# Patient Record
Sex: MALE | Race: WHITE | NOT HISPANIC OR LATINO
[De-identification: names, ages, dates, MRNs, and addresses within clinical notes are randomized per-mention and may not be internally consistent; named-entity substitution may affect disease eponyms.]

---

## 2017-01-04 PROBLEM — Z00.00 ENCOUNTER FOR PREVENTIVE HEALTH EXAMINATION: Status: ACTIVE | Noted: 2017-01-04

## 2017-01-11 ENCOUNTER — NON-APPOINTMENT (OUTPATIENT)
Age: 72
End: 2017-01-11

## 2017-01-11 ENCOUNTER — APPOINTMENT (OUTPATIENT)
Dept: CARDIOLOGY | Facility: CLINIC | Age: 72
End: 2017-01-11

## 2017-01-11 VITALS
BODY MASS INDEX: 32.35 KG/M2 | WEIGHT: 226 LBS | HEART RATE: 57 BPM | SYSTOLIC BLOOD PRESSURE: 154 MMHG | DIASTOLIC BLOOD PRESSURE: 75 MMHG | HEIGHT: 70 IN | OXYGEN SATURATION: 98 %

## 2017-01-11 VITALS — DIASTOLIC BLOOD PRESSURE: 82 MMHG | SYSTOLIC BLOOD PRESSURE: 129 MMHG

## 2017-01-11 VITALS — DIASTOLIC BLOOD PRESSURE: 74 MMHG | SYSTOLIC BLOOD PRESSURE: 136 MMHG

## 2017-01-11 DIAGNOSIS — Z86.73 PERSONAL HISTORY OF TRANSIENT ISCHEMIC ATTACK (TIA), AND CEREBRAL INFARCTION W/OUT RESIDUAL DEFICITS: ICD-10-CM

## 2017-01-11 DIAGNOSIS — Z09 ENCOUNTER FOR FOLLOW-UP EXAMINATION AFTER COMPLETED TREATMENT FOR CONDITIONS OTHER THAN MALIGNANT NEOPLASM: ICD-10-CM

## 2017-01-11 DIAGNOSIS — Z86.79 PERSONAL HISTORY OF OTHER DISEASES OF THE CIRCULATORY SYSTEM: ICD-10-CM

## 2017-01-11 RX ORDER — CARVEDILOL 6.25 MG/1
6.25 TABLET, FILM COATED ORAL
Qty: 60 | Refills: 4 | Status: DISCONTINUED | COMMUNITY
End: 2017-01-11

## 2017-01-11 RX ORDER — ASPIRIN 325 MG/1
325 TABLET, FILM COATED ORAL DAILY
Qty: 30 | Refills: 0 | Status: DISCONTINUED | COMMUNITY
End: 2017-01-11

## 2017-01-25 ENCOUNTER — MEDICATION RENEWAL (OUTPATIENT)
Age: 72
End: 2017-01-25

## 2017-01-27 ENCOUNTER — APPOINTMENT (OUTPATIENT)
Dept: ELECTROPHYSIOLOGY | Facility: CLINIC | Age: 72
End: 2017-01-27

## 2017-01-29 ENCOUNTER — EMERGENCY (EMERGENCY)
Facility: HOSPITAL | Age: 72
LOS: 1 days | Discharge: HOME | End: 2017-01-29
Attending: EMERGENCY MEDICINE | Admitting: EMERGENCY MEDICINE
Payer: MEDICARE

## 2017-01-29 VITALS
SYSTOLIC BLOOD PRESSURE: 168 MMHG | OXYGEN SATURATION: 100 % | HEIGHT: 70 IN | DIASTOLIC BLOOD PRESSURE: 69 MMHG | WEIGHT: 220.02 LBS | HEART RATE: 82 BPM | RESPIRATION RATE: 20 BRPM | TEMPERATURE: 99 F

## 2017-01-29 DIAGNOSIS — Z90.49 ACQUIRED ABSENCE OF OTHER SPECIFIED PARTS OF DIGESTIVE TRACT: Chronic | ICD-10-CM

## 2017-01-29 LAB
ANION GAP SERPL CALC-SCNC: 19 MMOL/L — HIGH (ref 5–17)
APPEARANCE UR: CLEAR — SIGNIFICANT CHANGE UP
BACTERIA # UR AUTO: ABNORMAL
BILIRUB UR-MCNC: NEGATIVE — SIGNIFICANT CHANGE UP
BUN SERPL-MCNC: 34 MG/DL — HIGH (ref 8–20)
CALCIUM SERPL-MCNC: 9.9 MG/DL — SIGNIFICANT CHANGE UP (ref 8.6–10.2)
CHLORIDE SERPL-SCNC: 101 MMOL/L — SIGNIFICANT CHANGE UP (ref 98–107)
CO2 SERPL-SCNC: 20 MMOL/L — LOW (ref 22–29)
COLOR SPEC: YELLOW — SIGNIFICANT CHANGE UP
CREAT SERPL-MCNC: 1.51 MG/DL — HIGH (ref 0.5–1.3)
DIFF PNL FLD: ABNORMAL
EPI CELLS # UR: SIGNIFICANT CHANGE UP
GLUCOSE SERPL-MCNC: 208 MG/DL — HIGH (ref 70–115)
GLUCOSE UR QL: 50 MG/DL
HCT VFR BLD CALC: 34.6 % — LOW (ref 42–52)
HGB BLD-MCNC: 11.9 G/DL — LOW (ref 14–18)
KETONES UR-MCNC: ABNORMAL
LEUKOCYTE ESTERASE UR-ACNC: NEGATIVE — SIGNIFICANT CHANGE UP
MCHC RBC-ENTMCNC: 29.8 PG — SIGNIFICANT CHANGE UP (ref 27–31)
MCHC RBC-ENTMCNC: 34.4 G/DL — SIGNIFICANT CHANGE UP (ref 32–36)
MCV RBC AUTO: 86.7 FL — SIGNIFICANT CHANGE UP (ref 80–94)
NITRITE UR-MCNC: NEGATIVE — SIGNIFICANT CHANGE UP
PH UR: 5 — SIGNIFICANT CHANGE UP (ref 4.8–8)
PLATELET # BLD AUTO: 200 K/UL — SIGNIFICANT CHANGE UP (ref 150–400)
POTASSIUM SERPL-MCNC: 4.1 MMOL/L — SIGNIFICANT CHANGE UP (ref 3.5–5.3)
POTASSIUM SERPL-SCNC: 4.1 MMOL/L — SIGNIFICANT CHANGE UP (ref 3.5–5.3)
PROT UR-MCNC: 100 MG/DL
RBC # BLD: 3.99 M/UL — LOW (ref 4.6–6.2)
RBC # FLD: 14.3 % — SIGNIFICANT CHANGE UP (ref 11–15.6)
RBC CASTS # UR COMP ASSIST: >50 /HPF (ref 0–4)
SODIUM SERPL-SCNC: 140 MMOL/L — SIGNIFICANT CHANGE UP (ref 135–145)
SP GR SPEC: 1.02 — SIGNIFICANT CHANGE UP (ref 1.01–1.02)
UROBILINOGEN FLD QL: NEGATIVE MG/DL — SIGNIFICANT CHANGE UP
WBC # BLD: 11.62 K/UL — HIGH (ref 4.8–10.8)
WBC # FLD AUTO: 11.62 K/UL — HIGH (ref 4.8–10.8)
WBC UR QL: SIGNIFICANT CHANGE UP

## 2017-01-29 PROCEDURE — 96374 THER/PROPH/DIAG INJ IV PUSH: CPT

## 2017-01-29 PROCEDURE — 80048 BASIC METABOLIC PNL TOTAL CA: CPT

## 2017-01-29 PROCEDURE — 99284 EMERGENCY DEPT VISIT MOD MDM: CPT

## 2017-01-29 PROCEDURE — 81001 URINALYSIS AUTO W/SCOPE: CPT

## 2017-01-29 PROCEDURE — 74176 CT ABD & PELVIS W/O CONTRAST: CPT | Mod: 26

## 2017-01-29 PROCEDURE — 85027 COMPLETE CBC AUTOMATED: CPT

## 2017-01-29 PROCEDURE — 96376 TX/PRO/DX INJ SAME DRUG ADON: CPT

## 2017-01-29 PROCEDURE — 96375 TX/PRO/DX INJ NEW DRUG ADDON: CPT

## 2017-01-29 PROCEDURE — 99284 EMERGENCY DEPT VISIT MOD MDM: CPT | Mod: 25

## 2017-01-29 PROCEDURE — 74176 CT ABD & PELVIS W/O CONTRAST: CPT

## 2017-01-29 RX ORDER — SODIUM CHLORIDE 9 MG/ML
3 INJECTION INTRAMUSCULAR; INTRAVENOUS; SUBCUTANEOUS ONCE
Qty: 0 | Refills: 0 | Status: COMPLETED | OUTPATIENT
Start: 2017-01-29 | End: 2017-01-29

## 2017-01-29 RX ORDER — SODIUM CHLORIDE 9 MG/ML
1000 INJECTION INTRAMUSCULAR; INTRAVENOUS; SUBCUTANEOUS
Qty: 0 | Refills: 0 | Status: DISCONTINUED | OUTPATIENT
Start: 2017-01-29 | End: 2017-02-02

## 2017-01-29 RX ORDER — MORPHINE SULFATE 50 MG/1
4 CAPSULE, EXTENDED RELEASE ORAL ONCE
Qty: 0 | Refills: 0 | Status: DISCONTINUED | OUTPATIENT
Start: 2017-01-29 | End: 2017-01-29

## 2017-01-29 RX ORDER — HYDROMORPHONE HYDROCHLORIDE 2 MG/ML
1 INJECTION INTRAMUSCULAR; INTRAVENOUS; SUBCUTANEOUS ONCE
Qty: 0 | Refills: 0 | Status: DISCONTINUED | OUTPATIENT
Start: 2017-01-29 | End: 2017-01-29

## 2017-01-29 RX ORDER — ONDANSETRON 8 MG/1
4 TABLET, FILM COATED ORAL ONCE
Qty: 0 | Refills: 0 | Status: COMPLETED | OUTPATIENT
Start: 2017-01-29 | End: 2017-01-29

## 2017-01-29 RX ORDER — KETOROLAC TROMETHAMINE 30 MG/ML
30 SYRINGE (ML) INJECTION ONCE
Qty: 0 | Refills: 0 | Status: DISCONTINUED | OUTPATIENT
Start: 2017-01-29 | End: 2017-01-29

## 2017-01-29 RX ORDER — MORPHINE SULFATE 50 MG/1
2 CAPSULE, EXTENDED RELEASE ORAL ONCE
Qty: 0 | Refills: 0 | Status: DISCONTINUED | OUTPATIENT
Start: 2017-01-29 | End: 2017-01-29

## 2017-01-29 RX ADMIN — HYDROMORPHONE HYDROCHLORIDE 1 MILLIGRAM(S): 2 INJECTION INTRAMUSCULAR; INTRAVENOUS; SUBCUTANEOUS at 21:49

## 2017-01-29 RX ADMIN — SODIUM CHLORIDE 3 MILLILITER(S): 9 INJECTION INTRAMUSCULAR; INTRAVENOUS; SUBCUTANEOUS at 21:14

## 2017-01-29 RX ADMIN — HYDROMORPHONE HYDROCHLORIDE 1 MILLIGRAM(S): 2 INJECTION INTRAMUSCULAR; INTRAVENOUS; SUBCUTANEOUS at 22:25

## 2017-01-29 RX ADMIN — SODIUM CHLORIDE 100 MILLILITER(S): 9 INJECTION INTRAMUSCULAR; INTRAVENOUS; SUBCUTANEOUS at 21:35

## 2017-01-29 RX ADMIN — HYDROMORPHONE HYDROCHLORIDE 1 MILLIGRAM(S): 2 INJECTION INTRAMUSCULAR; INTRAVENOUS; SUBCUTANEOUS at 21:15

## 2017-01-29 RX ADMIN — Medication 30 MILLIGRAM(S): at 23:55

## 2017-01-29 RX ADMIN — MORPHINE SULFATE 4 MILLIGRAM(S): 50 CAPSULE, EXTENDED RELEASE ORAL at 23:55

## 2017-01-29 RX ADMIN — ONDANSETRON 4 MILLIGRAM(S): 8 TABLET, FILM COATED ORAL at 21:14

## 2017-01-29 RX ADMIN — MORPHINE SULFATE 4 MILLIGRAM(S): 50 CAPSULE, EXTENDED RELEASE ORAL at 23:24

## 2017-01-29 RX ADMIN — HYDROMORPHONE HYDROCHLORIDE 1 MILLIGRAM(S): 2 INJECTION INTRAMUSCULAR; INTRAVENOUS; SUBCUTANEOUS at 22:02

## 2017-01-29 RX ADMIN — Medication 30 MILLIGRAM(S): at 23:24

## 2017-01-29 NOTE — ED ADULT NURSE NOTE - PMH
Acute appendicitis, unspecified acute appendicitis type    CVA (cerebral vascular accident)    Diabetes    History of hypertension    Hyperlipemia    Peptic ulcer due to Helicobacter pylori

## 2017-01-29 NOTE — ED PROVIDER NOTE - OBJECTIVE STATEMENT
72 y/o M on Plavix and aspirin c/o acute onset of right flank pain at 1730 today while at rest. He denies hx of renal stones. Pt denies dysuria, hematuria, burning urination, nausea, vomiting, and fever. No aggravating or alleviating factors. Pt reports CVA in December 2016. NKDA. No further complaints at this time.

## 2017-01-29 NOTE — ED PROVIDER NOTE - NS ED MD SCRIBE ATTENDING SCRIBE SECTIONS
DISPOSITION/INTAKE ASSESSMENT/SCREENINGS/VITAL SIGNS( Pullset)/PHYSICAL EXAM/HIV/PAST MEDICAL/SURGICAL/SOCIAL HISTORY/REVIEW OF SYSTEMS/HISTORY OF PRESENT ILLNESS

## 2017-01-29 NOTE — ED STATDOCS - PROGRESS NOTE DETAILS
TO [facility]. Male presents to the ED c/o right flank pain. He notes that he is unable to urinate. Pt does have family hx of renal stones, although he himself did not have a kidney stone. Pt is on plavix/coumadin s/p CVA 4 months ago. Denies vomiting, fever, chills, HA, CP or SOB. No further complaints at this time. Will order protocol orders and send to [facility] ED.

## 2017-01-29 NOTE — ED STATDOCS - NS ED MD SCRIBE ATTENDING SCRIBE SECTIONS
PAST MEDICAL/SURGICAL/SOCIAL HISTORY/HIV/VITAL SIGNS( Pullset)/PROGRESS NOTE/RESULTS/HISTORY OF PRESENT ILLNESS/DISPOSITION/INTAKE ASSESSMENT/SCREENINGS/CONSULTATIONS/SHIFT CHANGE/REVIEW OF SYSTEMS/PHYSICAL EXAM

## 2017-01-29 NOTE — ED STATDOCS - DETAILS:
I, Julieta Allen, performed the initial face to face bedside interview with this patient regarding history of present illness, review of symptoms and relevant past medical, social and family history.  I completed an independent physical examination.  I was the initial provider who evaluated this patient.  The history, relevant review of systems, past medical and surgical history, medical decision making, and physical examination was documented by the scribe in my presence and I attest to the accuracy of the documentation.

## 2017-01-29 NOTE — ED STATDOCS - MEDICAL DECISION MAKING DETAILS
Will send pt to main. Protocol orders entered. Will obtain labs, urine, CT scan, pain control and re-eval..

## 2017-01-30 RX ORDER — TAMSULOSIN HYDROCHLORIDE 0.4 MG/1
1 CAPSULE ORAL
Qty: 30 | Refills: 0 | OUTPATIENT
Start: 2017-01-30 | End: 2017-03-01

## 2017-02-04 ENCOUNTER — TRANSCRIPTION ENCOUNTER (OUTPATIENT)
Age: 72
End: 2017-02-04

## 2017-02-22 ENCOUNTER — EMERGENCY (EMERGENCY)
Facility: HOSPITAL | Age: 72
LOS: 1 days | Discharge: HOME | End: 2017-02-22
Attending: EMERGENCY MEDICINE | Admitting: EMERGENCY MEDICINE
Payer: MEDICARE

## 2017-02-22 VITALS
OXYGEN SATURATION: 98 % | HEART RATE: 69 BPM | RESPIRATION RATE: 20 BRPM | HEIGHT: 70 IN | TEMPERATURE: 97 F | DIASTOLIC BLOOD PRESSURE: 68 MMHG | WEIGHT: 212.08 LBS | SYSTOLIC BLOOD PRESSURE: 186 MMHG

## 2017-02-22 DIAGNOSIS — Z90.89 ACQUIRED ABSENCE OF OTHER ORGANS: ICD-10-CM

## 2017-02-22 DIAGNOSIS — M25.511 PAIN IN RIGHT SHOULDER: ICD-10-CM

## 2017-02-22 DIAGNOSIS — I10 ESSENTIAL (PRIMARY) HYPERTENSION: ICD-10-CM

## 2017-02-22 DIAGNOSIS — Z90.49 ACQUIRED ABSENCE OF OTHER SPECIFIED PARTS OF DIGESTIVE TRACT: Chronic | ICD-10-CM

## 2017-02-22 DIAGNOSIS — M79.631 PAIN IN RIGHT FOREARM: ICD-10-CM

## 2017-02-22 DIAGNOSIS — E11.9 TYPE 2 DIABETES MELLITUS WITHOUT COMPLICATIONS: ICD-10-CM

## 2017-02-22 DIAGNOSIS — E78.5 HYPERLIPIDEMIA, UNSPECIFIED: ICD-10-CM

## 2017-02-22 PROCEDURE — 99284 EMERGENCY DEPT VISIT MOD MDM: CPT

## 2017-02-22 PROCEDURE — 93971 EXTREMITY STUDY: CPT | Mod: 26,RT

## 2017-02-22 NOTE — ED STATDOCS - PROGRESS NOTE DETAILS
Pt seen in result waiting area. Pt states that his  right smoulder hurts a lot. Pt informed about US result . Examination for no pain on elevation right arm above head. pain with swinging motion. X-ray ordered and x-ray negative for acute  fracture. Pt . Pt D/C in stable condition

## 2017-02-22 NOTE — ED STATDOCS - MUSCULOSKELETAL, MLM
No tenderness over cervical spine. No bony stepoff. No tenderness to trapezius muscles b/l, no spasm. Full ROM of RUE. No tenderness to tricep/bicep area, however, opt subjectively states he has pain bicep/tricep area in R arm. No swelling no cord felt in bicep/tricep area. No paininelbow with flexion extension. No pain in tricep/bicep flexion extension. No pain with pronation supination of elbow. + tenderness to posterior aspect of R forearm, no swelling noted, full ROm of R wrist. Normal radial pulse b/l. Large lipoma of L shoulder blade. .

## 2017-02-22 NOTE — ED STATDOCS - MEDICAL DECISION MAKING DETAILS
Pt with no h/o injury, no CP, no SOB. Will do doppler study of RUE to r/o dvt, if negative d/c with muscle relaxant, f/u with pmd in 24 hours.

## 2017-02-22 NOTE — ED ADULT TRIAGE NOTE - CHIEF COMPLAINT QUOTE
Patient arrived to ED today with c/o right forearm pain, right shoulder pain, right triceps pain.  Patient states he has had the pain for 3 days.  Patient states he may have hurt himself throwing a ball.

## 2017-02-22 NOTE — ED STATDOCS - OBJECTIVE STATEMENT
70 y/o M on Plavix with h/o two CVA's presents to the ED c/o sudden onset intermittent R arm pain x 3 days followed by back pain. Pt denies any injury, he states when he is lying down, or raises his arm he has no pain. His pain comes when he is driving sometimes, but is alleviated when he raises his arm. Pt reports he feels as if his vein is pulsating in his arm. No further complaints at this time.  He regularly takes metformin, pioglitazone, losartan, vascepa, atorvastatin, ASA, clopidogrel, amlodipine.

## 2017-02-22 NOTE — ED STATDOCS - CARE PLAN
Principal Discharge DX:	Pain of right upper extremity  Instructions for follow-up, activity and diet:	Continue with OTC pain medication and F/U with PCP as discussed  Secondary Diagnosis:	Acute pain of right shoulder

## 2017-02-23 PROCEDURE — 93971 EXTREMITY STUDY: CPT

## 2017-02-23 PROCEDURE — 73030 X-RAY EXAM OF SHOULDER: CPT | Mod: 26,RT

## 2017-02-23 PROCEDURE — 73030 X-RAY EXAM OF SHOULDER: CPT

## 2017-02-23 PROCEDURE — 99284 EMERGENCY DEPT VISIT MOD MDM: CPT | Mod: 25

## 2017-02-28 ENCOUNTER — APPOINTMENT (OUTPATIENT)
Dept: CARDIOLOGY | Facility: CLINIC | Age: 72
End: 2017-02-28

## 2017-03-02 ENCOUNTER — APPOINTMENT (OUTPATIENT)
Dept: ELECTROPHYSIOLOGY | Facility: CLINIC | Age: 72
End: 2017-03-02

## 2017-03-03 ENCOUNTER — APPOINTMENT (OUTPATIENT)
Dept: ELECTROPHYSIOLOGY | Facility: CLINIC | Age: 72
End: 2017-03-03

## 2017-04-07 ENCOUNTER — APPOINTMENT (OUTPATIENT)
Dept: ELECTROPHYSIOLOGY | Facility: CLINIC | Age: 72
End: 2017-04-07

## 2017-04-10 RX ORDER — CLOPIDOGREL BISULFATE 75 MG/1
75 TABLET, FILM COATED ORAL
Qty: 30 | Refills: 3 | Status: DISCONTINUED | COMMUNITY
End: 2017-04-10

## 2017-04-12 ENCOUNTER — APPOINTMENT (OUTPATIENT)
Dept: CARDIOLOGY | Facility: CLINIC | Age: 72
End: 2017-04-12

## 2017-04-12 VITALS
OXYGEN SATURATION: 100 % | BODY MASS INDEX: 29.49 KG/M2 | HEIGHT: 70 IN | DIASTOLIC BLOOD PRESSURE: 74 MMHG | WEIGHT: 206 LBS | SYSTOLIC BLOOD PRESSURE: 160 MMHG | HEART RATE: 63 BPM

## 2017-04-12 VITALS — DIASTOLIC BLOOD PRESSURE: 68 MMHG | SYSTOLIC BLOOD PRESSURE: 163 MMHG

## 2017-04-14 LAB
ANION GAP SERPL CALC-SCNC: 14 MMOL/L
BUN SERPL-MCNC: 33 MG/DL
CALCIUM SERPL-MCNC: 10 MG/DL
CHLORIDE SERPL-SCNC: 106 MMOL/L
CO2 SERPL-SCNC: 23 MMOL/L
CREAT SERPL-MCNC: 1.37 MG/DL
GLUCOSE SERPL-MCNC: 238 MG/DL
POTASSIUM SERPL-SCNC: 4.9 MMOL/L
SODIUM SERPL-SCNC: 143 MMOL/L

## 2017-05-12 ENCOUNTER — APPOINTMENT (OUTPATIENT)
Dept: ELECTROPHYSIOLOGY | Facility: CLINIC | Age: 72
End: 2017-05-12

## 2017-06-13 ENCOUNTER — NON-APPOINTMENT (OUTPATIENT)
Age: 72
End: 2017-06-13

## 2017-06-13 ENCOUNTER — APPOINTMENT (OUTPATIENT)
Dept: CARDIOLOGY | Facility: CLINIC | Age: 72
End: 2017-06-13

## 2017-06-13 VITALS
WEIGHT: 210 LBS | DIASTOLIC BLOOD PRESSURE: 52 MMHG | SYSTOLIC BLOOD PRESSURE: 136 MMHG | OXYGEN SATURATION: 99 % | BODY MASS INDEX: 30.06 KG/M2 | HEART RATE: 54 BPM | HEIGHT: 70 IN

## 2017-06-13 VITALS — DIASTOLIC BLOOD PRESSURE: 50 MMHG | SYSTOLIC BLOOD PRESSURE: 124 MMHG

## 2017-06-13 DIAGNOSIS — Z86.39 PERSONAL HISTORY OF OTHER ENDOCRINE, NUTRITIONAL AND METABOLIC DISEASE: ICD-10-CM

## 2017-06-16 ENCOUNTER — APPOINTMENT (OUTPATIENT)
Dept: ELECTROPHYSIOLOGY | Facility: CLINIC | Age: 72
End: 2017-06-16

## 2017-06-21 ENCOUNTER — APPOINTMENT (OUTPATIENT)
Dept: CARDIOLOGY | Facility: CLINIC | Age: 72
End: 2017-06-21

## 2017-07-12 ENCOUNTER — APPOINTMENT (OUTPATIENT)
Dept: CARDIOLOGY | Facility: CLINIC | Age: 72
End: 2017-07-12

## 2017-07-17 ENCOUNTER — APPOINTMENT (OUTPATIENT)
Dept: ELECTROPHYSIOLOGY | Facility: CLINIC | Age: 72
End: 2017-07-17

## 2017-07-18 ENCOUNTER — APPOINTMENT (OUTPATIENT)
Dept: CARDIOLOGY | Facility: CLINIC | Age: 72
End: 2017-07-18

## 2017-07-26 PROBLEM — Z86.39 HISTORY OF DIABETES MELLITUS: Status: RESOLVED | Noted: 2017-01-11 | Resolved: 2017-07-26

## 2017-08-03 ENCOUNTER — CLINICAL ADVICE (OUTPATIENT)
Age: 72
End: 2017-08-03

## 2017-08-18 ENCOUNTER — APPOINTMENT (OUTPATIENT)
Dept: ELECTROPHYSIOLOGY | Facility: CLINIC | Age: 72
End: 2017-08-18
Payer: MEDICARE

## 2017-08-18 PROCEDURE — 93298 REM INTERROG DEV EVAL SCRMS: CPT

## 2017-08-18 PROCEDURE — 93299: CPT

## 2017-08-31 ENCOUNTER — RESULT REVIEW (OUTPATIENT)
Age: 72
End: 2017-08-31

## 2017-09-18 ENCOUNTER — APPOINTMENT (OUTPATIENT)
Dept: ELECTROPHYSIOLOGY | Facility: CLINIC | Age: 72
End: 2017-09-18
Payer: MEDICARE

## 2017-09-18 PROCEDURE — 93298 REM INTERROG DEV EVAL SCRMS: CPT

## 2017-09-18 PROCEDURE — 93299: CPT

## 2017-09-25 ENCOUNTER — RX RENEWAL (OUTPATIENT)
Age: 72
End: 2017-09-25

## 2017-09-25 RX ORDER — AMLODIPINE BESYLATE 5 MG/1
5 TABLET ORAL TWICE DAILY
Qty: 120 | Refills: 3 | Status: DISCONTINUED | COMMUNITY
End: 2017-09-25

## 2017-10-20 ENCOUNTER — APPOINTMENT (OUTPATIENT)
Dept: ELECTROPHYSIOLOGY | Facility: CLINIC | Age: 72
End: 2017-10-20
Payer: MEDICARE

## 2017-10-20 PROCEDURE — 93299: CPT

## 2017-10-20 PROCEDURE — 93298 REM INTERROG DEV EVAL SCRMS: CPT

## 2017-10-31 ENCOUNTER — RX RENEWAL (OUTPATIENT)
Age: 72
End: 2017-10-31

## 2017-11-20 ENCOUNTER — APPOINTMENT (OUTPATIENT)
Dept: ELECTROPHYSIOLOGY | Facility: CLINIC | Age: 72
End: 2017-11-20
Payer: MEDICARE

## 2017-11-20 PROCEDURE — 93299: CPT

## 2017-11-20 PROCEDURE — 93298 REM INTERROG DEV EVAL SCRMS: CPT

## 2017-12-05 ENCOUNTER — APPOINTMENT (OUTPATIENT)
Dept: CARDIOLOGY | Facility: CLINIC | Age: 72
End: 2017-12-05
Payer: MEDICARE

## 2017-12-05 VITALS
OXYGEN SATURATION: 98 % | WEIGHT: 213 LBS | BODY MASS INDEX: 30.56 KG/M2 | SYSTOLIC BLOOD PRESSURE: 143 MMHG | DIASTOLIC BLOOD PRESSURE: 73 MMHG | HEART RATE: 49 BPM

## 2017-12-05 DIAGNOSIS — D64.9 ANEMIA, UNSPECIFIED: ICD-10-CM

## 2017-12-05 PROCEDURE — 99215 OFFICE O/P EST HI 40 MIN: CPT

## 2017-12-12 ENCOUNTER — OUTPATIENT (OUTPATIENT)
Dept: OUTPATIENT SERVICES | Facility: HOSPITAL | Age: 72
LOS: 1 days | End: 2017-12-12
Payer: MEDICARE

## 2017-12-12 VITALS
OXYGEN SATURATION: 98 % | RESPIRATION RATE: 16 BRPM | HEIGHT: 70 IN | WEIGHT: 212.97 LBS | DIASTOLIC BLOOD PRESSURE: 90 MMHG | TEMPERATURE: 98 F | SYSTOLIC BLOOD PRESSURE: 164 MMHG | HEART RATE: 59 BPM

## 2017-12-12 VITALS — DIASTOLIC BLOOD PRESSURE: 90 MMHG | SYSTOLIC BLOOD PRESSURE: 164 MMHG

## 2017-12-12 DIAGNOSIS — Z98.890 OTHER SPECIFIED POSTPROCEDURAL STATES: Chronic | ICD-10-CM

## 2017-12-12 DIAGNOSIS — Z01.810 ENCOUNTER FOR PREPROCEDURAL CARDIOVASCULAR EXAMINATION: ICD-10-CM

## 2017-12-12 DIAGNOSIS — Z90.49 ACQUIRED ABSENCE OF OTHER SPECIFIED PARTS OF DIGESTIVE TRACT: Chronic | ICD-10-CM

## 2017-12-12 LAB
ANION GAP SERPL CALC-SCNC: 14 MMOL/L — SIGNIFICANT CHANGE UP (ref 5–17)
APTT BLD: 43.1 SEC — HIGH (ref 27.5–37.4)
BUN SERPL-MCNC: 32 MG/DL — HIGH (ref 8–20)
CALCIUM SERPL-MCNC: 9.8 MG/DL — SIGNIFICANT CHANGE UP (ref 8.6–10.2)
CHLORIDE SERPL-SCNC: 104 MMOL/L — SIGNIFICANT CHANGE UP (ref 98–107)
CO2 SERPL-SCNC: 24 MMOL/L — SIGNIFICANT CHANGE UP (ref 22–29)
CREAT SERPL-MCNC: 1.2 MG/DL — SIGNIFICANT CHANGE UP (ref 0.5–1.3)
GLUCOSE SERPL-MCNC: 159 MG/DL — HIGH (ref 70–115)
HCT VFR BLD CALC: 37.4 % — LOW (ref 42–52)
HGB BLD-MCNC: 12.3 G/DL — LOW (ref 14–18)
INR BLD: 1.3 RATIO — HIGH (ref 0.88–1.16)
MAGNESIUM SERPL-MCNC: 1.8 MG/DL — SIGNIFICANT CHANGE UP (ref 1.6–2.6)
MCHC RBC-ENTMCNC: 29.2 PG — SIGNIFICANT CHANGE UP (ref 27–31)
MCHC RBC-ENTMCNC: 32.9 G/DL — SIGNIFICANT CHANGE UP (ref 32–36)
MCV RBC AUTO: 88.8 FL — SIGNIFICANT CHANGE UP (ref 80–94)
PLATELET # BLD AUTO: 201 K/UL — SIGNIFICANT CHANGE UP (ref 150–400)
POTASSIUM SERPL-MCNC: 4.3 MMOL/L — SIGNIFICANT CHANGE UP (ref 3.5–5.3)
POTASSIUM SERPL-SCNC: 4.3 MMOL/L — SIGNIFICANT CHANGE UP (ref 3.5–5.3)
PROTHROM AB SERPL-ACNC: 14.4 SEC — HIGH (ref 9.8–12.7)
RBC # BLD: 4.21 M/UL — LOW (ref 4.6–6.2)
RBC # FLD: 14.9 % — SIGNIFICANT CHANGE UP (ref 11–15.6)
SODIUM SERPL-SCNC: 142 MMOL/L — SIGNIFICANT CHANGE UP (ref 135–145)
WBC # BLD: 5.3 K/UL — SIGNIFICANT CHANGE UP (ref 4.8–10.8)
WBC # FLD AUTO: 5.3 K/UL — SIGNIFICANT CHANGE UP (ref 4.8–10.8)

## 2017-12-12 PROCEDURE — 36415 COLL VENOUS BLD VENIPUNCTURE: CPT

## 2017-12-12 PROCEDURE — 93010 ELECTROCARDIOGRAM REPORT: CPT

## 2017-12-12 PROCEDURE — 83735 ASSAY OF MAGNESIUM: CPT

## 2017-12-12 PROCEDURE — 85730 THROMBOPLASTIN TIME PARTIAL: CPT

## 2017-12-12 PROCEDURE — 80048 BASIC METABOLIC PNL TOTAL CA: CPT

## 2017-12-12 PROCEDURE — 85610 PROTHROMBIN TIME: CPT

## 2017-12-12 PROCEDURE — 85027 COMPLETE CBC AUTOMATED: CPT

## 2017-12-12 PROCEDURE — G0463: CPT

## 2017-12-12 PROCEDURE — 93005 ELECTROCARDIOGRAM TRACING: CPT

## 2017-12-12 NOTE — H&P PST ADULT - NSANTHOSAYNRD_GEN_A_CORE
No. LEN screening performed.  STOP BANG Legend: 0-2 = LOW Risk; 3-4 = INTERMEDIATE Risk; 5-8 = HIGH Risk

## 2017-12-12 NOTE — H&P PST ADULT - ASSESSMENT
71 yo male with pmhx HTN, HLD, DM, anemia, CVA 12/2016 s/p ILR implant which revealed PAF. PT was started on anticoagulation eliquis, later switched to xarelto due to high copay.   Pt presents for PST for elective ILR explant. He feels well today.    ILR implant: 12/27/2016-Dr Herbert- for crypotgenic stroke   TTE 12/2016: EF 60-65%, mild MR

## 2017-12-12 NOTE — H&P PST ADULT - PMH
Atrial fibrillation  paroxysmal on xarelto  CVA (cerebral vascular accident)    Diabetes    Diabetes    History of hypertension    Hyperlipemia    Peptic ulcer due to Helicobacter pylori Atrial fibrillation  paroxysmal on xarelto  BPH (benign prostatic hyperplasia)    CVA (cerebral vascular accident)    Diabetes    History of hypertension    [place] (hard of hearing)    Hyperlipemia

## 2017-12-12 NOTE — H&P PST ADULT - HISTORY OF PRESENT ILLNESS
71 yo male with pmhx HTN, HLD, DM, anemia, CVA 12/2016 s/p ILR implant which revealed PAF. PT was started on anticoagulation eliquis, later switched to xarelto due to high copay.     ILR implant: 12/27/2016-Dr Herbert- for crypotgenic stroke   TTE 12/2016: EF 60-65%, mild MR 73 yo male with pmhx HTN, HLD, DM, anemia, CVA 12/2016 s/p ILR implant which revealed PAF. PT was started on anticoagulation eliquis, later switched to xarelto due to high copay.   Pt presents for PST for elective ILR explant. He feels well today.    ILR implant: 12/27/2016-Dr Herbert- for crypotgenic stroke   TTE 12/2016: EF 60-65%, mild MR

## 2017-12-12 NOTE — ASU PATIENT PROFILE, ADULT - PMH
Atrial fibrillation  paroxysmal on xarelto  BPH (benign prostatic hyperplasia)    CVA (cerebral vascular accident)    Diabetes    History of hypertension    [place] (hard of hearing)    Hyperlipemia

## 2017-12-15 ENCOUNTER — TRANSCRIPTION ENCOUNTER (OUTPATIENT)
Age: 72
End: 2017-12-15

## 2017-12-15 ENCOUNTER — MEDICATION RENEWAL (OUTPATIENT)
Age: 72
End: 2017-12-15

## 2017-12-15 ENCOUNTER — OUTPATIENT (OUTPATIENT)
Dept: OUTPATIENT SERVICES | Facility: HOSPITAL | Age: 72
LOS: 1 days | End: 2017-12-15
Payer: MEDICARE

## 2017-12-15 VITALS
SYSTOLIC BLOOD PRESSURE: 159 MMHG | TEMPERATURE: 98 F | DIASTOLIC BLOOD PRESSURE: 70 MMHG | OXYGEN SATURATION: 96 % | RESPIRATION RATE: 20 BRPM | HEART RATE: 85 BPM

## 2017-12-15 DIAGNOSIS — I63.50 CEREBRAL INFARCTION DUE TO UNSPECIFIED OCCLUSION OR STENOSIS OF UNSPECIFIED CEREBRAL ARTERY: ICD-10-CM

## 2017-12-15 DIAGNOSIS — Z90.49 ACQUIRED ABSENCE OF OTHER SPECIFIED PARTS OF DIGESTIVE TRACT: Chronic | ICD-10-CM

## 2017-12-15 DIAGNOSIS — Z98.890 OTHER SPECIFIED POSTPROCEDURAL STATES: Chronic | ICD-10-CM

## 2017-12-15 LAB — GLUCOSE BLDC GLUCOMTR-MCNC: 101 MG/DL — HIGH (ref 70–99)

## 2017-12-15 PROCEDURE — 33284: CPT

## 2017-12-15 PROCEDURE — 82962 GLUCOSE BLOOD TEST: CPT

## 2017-12-15 RX ORDER — CEFAZOLIN SODIUM 1 G
2000 VIAL (EA) INJECTION ONCE
Qty: 0 | Refills: 0 | Status: COMPLETED | OUTPATIENT
Start: 2017-12-15 | End: 2017-12-15

## 2017-12-15 RX ADMIN — Medication 100 MILLIGRAM(S): at 16:42

## 2017-12-15 NOTE — DISCHARGE NOTE ADULT - PATIENT PORTAL LINK FT
“You can access the Follow[facility] Patient Portal, offered by [facility], by registering with the following website: [email or web address]”

## 2017-12-15 NOTE — DISCHARGE NOTE ADULT - CARE PLAN
Is This A New Presentation, Or A Follow-Up?: Skin Lesions
Principal Discharge DX:	History of loop recorder  Goal:	s/p successful loop removal  Instructions for follow-up, activity and diet:	Loop Recorder Incision Care:     - Remove the plastic and gauze dressing after 24 hours.   - Do not touch the incision until it is completely healed.   - There is Dermabond (skin glue) on your incision, which will start to flake off on its own over the next 2-3 weeks. Do not pick at or peal off the Dermabond.   - Do not apply soaps, creams, lotions, ointments or powders to the incision until it is completely healed.  - You should call the doctor if you notice redness, drainage, swelling, increased tenderness, hot sensation around the  incision, bleeding or incision edges pulling apart.

## 2017-12-15 NOTE — DISCHARGE NOTE ADULT - MEDICATION SUMMARY - MEDICATIONS TO TAKE
I will START or STAY ON the medications listed below when I get home from the hospital:    losartan 100 mg oral tablet  -- 1 tab(s) by mouth once a day  -- Indication: For HTN    dutasteride-tamsulosin 0.5 mg-0.4 mg oral capsule  -- 1 cap(s) by mouth once a day  -- Indication: For HTN    Xarelto 20 mg oral tablet  -- 1 tab(s) by mouth once a day (in the evening)  -- Indication: For AF    pioglitazone 45 mg oral tablet  -- orally once a day  -- Indication: For DM    Vascepa 1 g oral capsule  -- 1 cap(s) by mouth 2 times a day  -- Indication: For HLD    atorvastatin 40 mg oral tablet  -- 1 tab(s) by mouth once a day  -- Indication: For HLD    doxycycline hyclate 100 mg oral capsule  -- 1 cap(s) by mouth 2 times a day x 3 days   -- Avoid prolonged or excessive exposure to direct and/or artificial sunlight while taking this medication.  Do not take this drug if you are pregnant.  Finish all this medication unless otherwise directed by prescriber.  Medication should be taken with plenty of water.    -- Indication: For Post loop    metoprolol tartrate 25 mg oral tablet  -- orally once a day  -- Indication: For HTN    amLODIPine 10 mg oral tablet  -- 1 tab(s) by mouth once a day  -- Indication: For HTN

## 2017-12-15 NOTE — PROGRESS NOTE ADULT - SUBJECTIVE AND OBJECTIVE BOX
pt presents for elective ILR explant     npo >8 hours  finger stick 101    uninterrupted xarelto  held am metformin

## 2017-12-15 NOTE — DISCHARGE NOTE ADULT - HOSPITAL COURSE
73 yo male with pmhx HTN, HLD, DM, anemia, CVA 12/2016 s/p ILR implant which revealed PAF. PT was started on anticoagulation eliquis, later switched to xarelto due to high copay.   Pt s/p loop explant.     ILR implant: 12/27/2016-Dr Herbert- for crypotgenic stroke   TTE 12/2016: EF 60-65%, mild MR

## 2017-12-15 NOTE — DISCHARGE NOTE ADULT - PLAN OF CARE
s/p successful loop removal Loop Recorder Incision Care:     - Remove the plastic and gauze dressing after 24 hours.   - Do not touch the incision until it is completely healed.   - There is Dermabond (skin glue) on your incision, which will start to flake off on its own over the next 2-3 weeks. Do not pick at or peal off the Dermabond.   - Do not apply soaps, creams, lotions, ointments or powders to the incision until it is completely healed.  - You should call the doctor if you notice redness, drainage, swelling, increased tenderness, hot sensation around the  incision, bleeding or incision edges pulling apart.

## 2017-12-15 NOTE — DISCHARGE NOTE ADULT - CARE PROVIDER_API CALL
Salvador Herbert), Cardiology; Internal Medicine  [address] [place] [address]  Phone: [phone]  Fax: [phone]

## 2017-12-22 ENCOUNTER — APPOINTMENT (OUTPATIENT)
Dept: ELECTROPHYSIOLOGY | Facility: CLINIC | Age: 72
End: 2017-12-22
Payer: MEDICARE

## 2017-12-22 PROCEDURE — 93299: CPT

## 2017-12-22 PROCEDURE — 93298 REM INTERROG DEV EVAL SCRMS: CPT

## 2018-01-22 ENCOUNTER — APPOINTMENT (OUTPATIENT)
Dept: ELECTROPHYSIOLOGY | Facility: CLINIC | Age: 73
End: 2018-01-22

## 2018-01-27 ENCOUNTER — MEDICATION RENEWAL (OUTPATIENT)
Age: 73
End: 2018-01-27

## 2018-01-28 RX ORDER — APIXABAN 5 MG/1
5 TABLET, FILM COATED ORAL
Qty: 30 | Refills: 3 | Status: DISCONTINUED | COMMUNITY
Start: 2017-04-10 | End: 2018-01-28

## 2018-01-28 RX ORDER — AMLODIPINE BESYLATE 10 MG/1
10 TABLET ORAL
Qty: 30 | Refills: 5 | Status: DISCONTINUED | COMMUNITY
Start: 2017-09-25 | End: 2018-01-28

## 2018-01-28 RX ORDER — ATORVASTATIN CALCIUM 80 MG/1
80 TABLET, FILM COATED ORAL
Qty: 30 | Refills: 0 | Status: DISCONTINUED | COMMUNITY
Start: 2016-12-22 | End: 2018-01-28

## 2018-02-23 ENCOUNTER — APPOINTMENT (OUTPATIENT)
Dept: ELECTROPHYSIOLOGY | Facility: CLINIC | Age: 73
End: 2018-02-23

## 2018-03-26 ENCOUNTER — APPOINTMENT (OUTPATIENT)
Dept: ELECTROPHYSIOLOGY | Facility: CLINIC | Age: 73
End: 2018-03-26

## 2018-03-27 ENCOUNTER — MEDICATION RENEWAL (OUTPATIENT)
Age: 73
End: 2018-03-27

## 2018-04-27 ENCOUNTER — APPOINTMENT (OUTPATIENT)
Dept: ELECTROPHYSIOLOGY | Facility: CLINIC | Age: 73
End: 2018-04-27

## 2018-05-29 ENCOUNTER — APPOINTMENT (OUTPATIENT)
Dept: ELECTROPHYSIOLOGY | Facility: CLINIC | Age: 73
End: 2018-05-29

## 2018-07-16 NOTE — ED STATDOCS - NS ED MD SCRIBE ATTENDING SCRIBE SECTIONS
PAST MEDICAL/SURGICAL/SOCIAL HISTORY/PHYSICAL EXAM/HIV/HISTORY OF PRESENT ILLNESS/REVIEW OF SYSTEMS/VITAL SIGNS( Pullset)/DISPOSITION
monthly or less/stopped during this pregnancy

## 2018-07-20 ENCOUNTER — NON-APPOINTMENT (OUTPATIENT)
Age: 73
End: 2018-07-20

## 2018-07-20 ENCOUNTER — APPOINTMENT (OUTPATIENT)
Dept: CARDIOLOGY | Facility: CLINIC | Age: 73
End: 2018-07-20
Payer: MEDICARE

## 2018-07-20 VITALS
BODY MASS INDEX: 32.5 KG/M2 | OXYGEN SATURATION: 98 % | HEIGHT: 70 IN | WEIGHT: 227 LBS | DIASTOLIC BLOOD PRESSURE: 70 MMHG | HEART RATE: 53 BPM | SYSTOLIC BLOOD PRESSURE: 157 MMHG

## 2018-07-20 VITALS — DIASTOLIC BLOOD PRESSURE: 74 MMHG | SYSTOLIC BLOOD PRESSURE: 148 MMHG

## 2018-07-20 PROBLEM — I48.91 UNSPECIFIED ATRIAL FIBRILLATION: Chronic | Status: ACTIVE | Noted: 2017-12-12

## 2018-07-20 PROBLEM — N40.0 BENIGN PROSTATIC HYPERPLASIA WITHOUT LOWER URINARY TRACT SYMPTOMS: Chronic | Status: ACTIVE | Noted: 2017-12-12

## 2018-07-20 PROBLEM — H91.90 UNSPECIFIED HEARING LOSS, UNSPECIFIED EAR: Chronic | Status: ACTIVE | Noted: 2017-12-12

## 2018-07-20 PROBLEM — E11.9 TYPE 2 DIABETES MELLITUS WITHOUT COMPLICATIONS: Chronic | Status: ACTIVE | Noted: 2017-12-12

## 2018-07-20 PROCEDURE — 93000 ELECTROCARDIOGRAM COMPLETE: CPT

## 2018-07-20 PROCEDURE — 99215 OFFICE O/P EST HI 40 MIN: CPT

## 2018-07-20 RX ORDER — LOSARTAN POTASSIUM 100 MG/1
100 TABLET, FILM COATED ORAL DAILY
Qty: 30 | Refills: 3 | Status: DISCONTINUED | COMMUNITY
End: 2018-07-20

## 2018-07-28 PROBLEM — Z86.73 HISTORY OF STROKE: Status: RESOLVED | Noted: 2017-01-11 | Resolved: 2018-07-28

## 2018-08-13 ENCOUNTER — MEDICATION RENEWAL (OUTPATIENT)
Age: 73
End: 2018-08-13

## 2018-08-14 ENCOUNTER — MEDICATION RENEWAL (OUTPATIENT)
Age: 73
End: 2018-08-14

## 2018-12-06 ENCOUNTER — RX RENEWAL (OUTPATIENT)
Age: 73
End: 2018-12-06

## 2018-12-19 ENCOUNTER — RX RENEWAL (OUTPATIENT)
Age: 73
End: 2018-12-19

## 2019-04-09 ENCOUNTER — NON-APPOINTMENT (OUTPATIENT)
Age: 74
End: 2019-04-09

## 2019-04-09 ENCOUNTER — APPOINTMENT (OUTPATIENT)
Dept: CARDIOLOGY | Facility: CLINIC | Age: 74
End: 2019-04-09
Payer: MEDICARE

## 2019-04-09 VITALS
SYSTOLIC BLOOD PRESSURE: 137 MMHG | HEIGHT: 70 IN | HEART RATE: 61 BPM | WEIGHT: 220 LBS | BODY MASS INDEX: 31.5 KG/M2 | OXYGEN SATURATION: 100 % | DIASTOLIC BLOOD PRESSURE: 71 MMHG

## 2019-04-09 DIAGNOSIS — Z83.3 FAMILY HISTORY OF DIABETES MELLITUS: ICD-10-CM

## 2019-04-09 DIAGNOSIS — Z82.49 FAMILY HISTORY OF ISCHEMIC HEART DISEASE AND OTHER DISEASES OF THE CIRCULATORY SYSTEM: ICD-10-CM

## 2019-04-09 PROCEDURE — 99215 OFFICE O/P EST HI 40 MIN: CPT

## 2019-04-09 PROCEDURE — 93000 ELECTROCARDIOGRAM COMPLETE: CPT

## 2019-04-09 RX ORDER — TAMSULOSIN HYDROCHLORIDE 0.4 MG/1
0.4 CAPSULE ORAL
Qty: 30 | Refills: 0 | Status: DISCONTINUED | COMMUNITY
Start: 2017-01-30 | End: 2019-04-09

## 2019-04-09 RX ORDER — POTASSIUM PHOSPHATE, MONOBASIC AND SODIUM PHOSPHATE, MONOBASIC, ANHYDROUS 305; 700 MG/1; MG/1
305-700 TABLET, COATED ORAL
Qty: 8 | Refills: 0 | Status: DISCONTINUED | COMMUNITY
Start: 2016-12-22 | End: 2019-04-09

## 2019-04-09 RX ORDER — ICOSAPENT ETHYL 1000 MG/1
1 CAPSULE ORAL
Refills: 0 | Status: DISCONTINUED | COMMUNITY
End: 2019-04-09

## 2019-04-09 RX ORDER — DUTASTERIDE AND TAMSULOSIN HYDROCHLORIDE .5; .4 MG/1; MG/1
0.5-0.4 CAPSULE ORAL
Qty: 90 | Refills: 0 | Status: DISCONTINUED | COMMUNITY
Start: 2017-04-25 | End: 2019-04-09

## 2019-04-09 RX ORDER — GLIMEPIRIDE 4 MG/1
4 TABLET ORAL DAILY
Refills: 0 | Status: DISCONTINUED | COMMUNITY
End: 2019-04-09

## 2019-04-09 RX ORDER — METAXALONE 800 MG/1
800 TABLET ORAL
Qty: 20 | Refills: 0 | Status: DISCONTINUED | COMMUNITY
Start: 2017-02-28 | End: 2019-04-09

## 2019-04-09 RX ORDER — AMOXICILLIN 875 MG/1
875 TABLET, FILM COATED ORAL
Qty: 14 | Refills: 0 | Status: DISCONTINUED | COMMUNITY
Start: 2019-01-02

## 2019-04-09 NOTE — CARDIOLOGY SUMMARY
[___] : [unfilled] [LVEF ___%] : LVEF [unfilled]% [None] : no pulmonary hypertension [Normal] : normal LA size [Mild] : mild mitral regurgitation [de-identified] : RONNY: Marked atherosclerosis of aorta. Otherwise, no other cardiac source of emboli. NO significant valvular abnormality.

## 2019-04-09 NOTE — OBJECTIVE
awaiting radiology [History reviewed] : History reviewed. [Medications and Allergies reviewed] : Medications and allergies reviewed.

## 2019-04-09 NOTE — REASON FOR VISIT
[FreeTextEntry2] : TIA/ stroke, s/p RONNY and Implantable loop recorder, paroxsymal afib  [Follow-Up - From Hospitalization] : follow-up of a recent hospitalization for [FreeTextEntry1] : TIA/ stroke, s/p RONNY and Implantable loop recorder, paroxsymal afib

## 2019-04-09 NOTE — DISCUSSION/SUMMARY
[Patient] : the patient [Risks] : risks [Benefits] : benefits [Alternatives] : alternatives [___ Month(s)] : [unfilled] month(s) [With Me] : with me [FreeTextEntry1] : This is a 73 year old male with HTN, DM, aortic atherosclerosis with pontine infarct with pontine infarct.\par 1) shoulder pain.. anginal equivalent. Risk factor s for coronary artery disease sherrie strtess echio. cannot walk on treadmill\par 2D echo.\par 2) Hand numbness: prior stroke. Carotid US. will get result sfrom his PMD. \par 3) Paroxmal afib: CHADSVASC 4 .ct  anticoagulation. Xarelto . ct beta blocker .  ct xarelto for now. s/p \par Implantable loop recorder removal. \par 4) Left pontine infarct: Cryptogenic.afib and now on anticoagulation. ct asa 81 mg daily. ct lipitor,  loop removal ordered. \par 5) HTN: Controlled. amlodipine  10 daily.  ct losartan 100 mg to losartan and HCTZ combi 100-25   toprol 25 mg daily.

## 2019-07-03 ENCOUNTER — APPOINTMENT (OUTPATIENT)
Dept: CARDIOLOGY | Facility: CLINIC | Age: 74
End: 2019-07-03
Payer: MEDICARE

## 2019-07-03 PROCEDURE — 93352 ADMIN ECG CONTRAST AGENT: CPT

## 2019-07-03 PROCEDURE — 93320 DOPPLER ECHO COMPLETE: CPT

## 2019-07-03 PROCEDURE — 93351 STRESS TTE COMPLETE: CPT

## 2019-07-03 PROCEDURE — 0439T MYOCRD CONTRAST PRFUJ ECHO: CPT | Mod: 59

## 2019-07-27 ENCOUNTER — TRANSCRIPTION ENCOUNTER (OUTPATIENT)
Age: 74
End: 2019-07-27

## 2019-12-11 ENCOUNTER — NON-APPOINTMENT (OUTPATIENT)
Age: 74
End: 2019-12-11

## 2019-12-11 ENCOUNTER — APPOINTMENT (OUTPATIENT)
Dept: CARDIOLOGY | Facility: CLINIC | Age: 74
End: 2019-12-11
Payer: MEDICARE

## 2019-12-11 VITALS
SYSTOLIC BLOOD PRESSURE: 161 MMHG | HEART RATE: 74 BPM | RESPIRATION RATE: 16 BRPM | OXYGEN SATURATION: 96 % | DIASTOLIC BLOOD PRESSURE: 68 MMHG

## 2019-12-11 VITALS — SYSTOLIC BLOOD PRESSURE: 167 MMHG | DIASTOLIC BLOOD PRESSURE: 72 MMHG

## 2019-12-11 VITALS — WEIGHT: 230 LBS | HEIGHT: 70 IN | BODY MASS INDEX: 32.93 KG/M2

## 2019-12-11 DIAGNOSIS — R68.89 OTHER GENERAL SYMPTOMS AND SIGNS: ICD-10-CM

## 2019-12-11 PROCEDURE — 93000 ELECTROCARDIOGRAM COMPLETE: CPT

## 2019-12-11 PROCEDURE — 99215 OFFICE O/P EST HI 40 MIN: CPT

## 2019-12-11 NOTE — DISCUSSION/SUMMARY
[Patient] : the patient [Risks] : risks [Benefits] : benefits [Alternatives] : alternatives [___ Week(s)] : [unfilled] week(s) [With ___] : with [unfilled] [FreeTextEntry1] : This is a 73 year old male with HTN, DM, aortic atherosclerosis with pontine infarct with pontine infarct.\par 1) shoulder pain. right shoulder pain. musculoskeletal. \par 2) Paroxmal afib: CHADSVASC 4 .ct  anticoagulation. Xarelto . ct beta blocker .  ct xarelto \par 3) Left pontine infarct: Cryptogenic.afib and now on anticoagulation. ct asa 81 mg daily. ct lipitor,   \par 4) HTN: Controlled. amlodipine  10 daily.  ct losartan 100 mg to losartan and HCTZ combi 100-25   toprol 25 mg daily. ate chinese food last night. \par nurse visit in 1 week for BP check If  >130 then add coreg CD 10-20 mg daily.

## 2019-12-11 NOTE — CARDIOLOGY SUMMARY
[LVEF ___%] : LVEF [unfilled]% [___] : [unfilled] [None] : normal LV function [Normal] : normal LA size [Mild] : mild mitral regurgitation [de-identified] : RONNY: Marked atherosclerosis of aorta. Otherwise, no other cardiac source of emboli. NO significant valvular abnormality.

## 2019-12-11 NOTE — PHYSICAL EXAM
no bleeding gums/no nausea/no syncope/no vomiting/no fever/no weakness/no chills/no numbness [Normal Appearance] : normal appearance [General Appearance - Well Developed] : well developed [Well Groomed] : well groomed [General Appearance - Well Nourished] : well nourished [Normal Conjunctiva] : the conjunctiva exhibited no abnormalities [General Appearance - In No Acute Distress] : no acute distress [No Deformities] : no deformities [Normal Oral Mucosa] : normal oral mucosa [Eyelids - No Xanthelasma] : the eyelids demonstrated no xanthelasmas [No Oral Pallor] : no oral pallor [No Oral Cyanosis] : no oral cyanosis [Normal Jugular Venous A Waves Present] : normal jugular venous A waves present [No Jugular Venous Leos A Waves] : no jugular venous leos A waves [Normal Jugular Venous V Waves Present] : normal jugular venous V waves present [Exaggerated Use Of Accessory Muscles For Inspiration] : no accessory muscle use [Respiration, Rhythm And Depth] : normal respiratory rhythm and effort [Auscultation Breath Sounds / Voice Sounds] : lungs were clear to auscultation bilaterally [Heart Rate And Rhythm] : heart rate and rhythm were normal [Heart Sounds] : normal S1 and S2 [Murmurs] : no murmurs present [Abdomen Mass (___ Cm)] : no abdominal mass palpated [Abdomen Tenderness] : non-tender [Abdomen Soft] : soft [Abnormal Walk] : normal gait [Cyanosis, Localized] : no localized cyanosis [Nail Clubbing] : no clubbing of the fingernails [Petechial Hemorrhages (___cm)] : no petechial hemorrhages [Skin Color & Pigmentation] : normal skin color and pigmentation [Skin Lesions] : no skin lesions [] : no rash [No Venous Stasis] : no venous stasis [No Skin Ulcers] : no skin ulcer [No Xanthoma] : no  xanthoma was observed [Affect] : the affect was normal [Oriented To Time, Place, And Person] : oriented to person, place, and time [Mood] : the mood was normal [No Anxiety] : not feeling anxious [FreeTextEntry1] : canot walk on treadmill. afraid to fall

## 2019-12-11 NOTE — REASON FOR VISIT
[Follow-Up - From Hospitalization] : follow-up of a recent hospitalization for [FreeTextEntry2] : TIA/ stroke, s/p RONNY and Implantable loop recorder, paroxsymal afib  [FreeTextEntry1] : TIA/ stroke, s/p RONNY and Implantable loop recorder, paroxsymal afib

## 2020-01-24 ENCOUNTER — APPOINTMENT (OUTPATIENT)
Dept: CARDIOLOGY | Facility: CLINIC | Age: 75
End: 2020-01-24
Payer: MEDICARE

## 2020-01-24 ENCOUNTER — RX CHANGE (OUTPATIENT)
Age: 75
End: 2020-01-24

## 2020-01-24 ENCOUNTER — NON-APPOINTMENT (OUTPATIENT)
Age: 75
End: 2020-01-24

## 2020-01-24 VITALS
WEIGHT: 225 LBS | HEART RATE: 64 BPM | DIASTOLIC BLOOD PRESSURE: 60 MMHG | BODY MASS INDEX: 32.21 KG/M2 | HEIGHT: 70 IN | OXYGEN SATURATION: 96 % | SYSTOLIC BLOOD PRESSURE: 158 MMHG

## 2020-01-24 VITALS — DIASTOLIC BLOOD PRESSURE: 56 MMHG | SYSTOLIC BLOOD PRESSURE: 150 MMHG

## 2020-01-24 PROCEDURE — 99214 OFFICE O/P EST MOD 30 MIN: CPT | Mod: 25

## 2020-01-24 PROCEDURE — 93000 ELECTROCARDIOGRAM COMPLETE: CPT

## 2020-01-29 RX ORDER — CARVEDILOL PHOSPHATE 10 MG/1
10 CAPSULE, EXTENDED RELEASE ORAL DAILY
Qty: 90 | Refills: 0 | Status: DISCONTINUED | COMMUNITY
Start: 2020-01-24 | End: 2020-01-29

## 2020-02-04 NOTE — REVIEW OF SYSTEMS
[Negative] : Heme/Lymph [Anxiety] : anxiety [Shortness Of Breath] : no shortness of breath [Chest Pain] : no chest pain [Palpitations] : no palpitations [Joint Pain] : no joint pain [Dizziness] : no dizziness [Easy Bruising] : no tendency for easy bruising [Easy Bleeding] : no tendency for easy bleeding

## 2020-02-04 NOTE — CARDIOLOGY SUMMARY
[___] : [unfilled] [LVEF ___%] : LVEF [unfilled]% [None] : normal LV function [Normal] : normal LA size [Mild] : mild mitral regurgitation [de-identified] : RONNY: Marked atherosclerosis of aorta. Otherwise, no other cardiac source of emboli. NO significant valvular abnormality.

## 2020-02-04 NOTE — END OF VISIT
[Time Spent: ___ minutes] : I have spent [unfilled] minutes of face to face time with the patient [>50% of Time Spent on Counseling for ____] : Greater than 50% of the encounter time was spent on counseling for [unfilled] [FreeTextEntry3] : I personally discussed this patient with Nurse Practitioner/Physician Assistant. I agree with the assessment and plan as written, unless noted below.

## 2020-02-04 NOTE — DISCUSSION/SUMMARY
[Patient] : the patient [Risks] : risks [Alternatives] : alternatives [Benefits] : benefits [FreeTextEntry1] : This is a 74 year old male with HTN, DM, aortic atherosclerosis with pontine infarct with pontine infarct, returns for follow uo of HTN. \par \par 1.  Paroxmal afib: CHADSVASC 4, SR on EKG today. ct.  Xarelto\par 2.  Left pontine infarct: Cryptogenic. afib and on anticoagulation. ct asa 81 mg daily. ct lipitor,   \par 3.  HTN: elevated today, 158/68, start coreg CD 10 mg dily, cont. amlodipine  10 daily,  losartan 100 mg to losartan and HCTZ combi 100-25   \par 4. BP check in office next week. If BP is >130 then increase  Coreg CD to 20 mg daily. \par 5. Discussed about lifestyle modifications, counseled on weight loss, decrease carb intake, avoid skipping meals, increase physical activity at least 150 min. moderate physical activity with in 1 week (AHA recommendation). \par 6. f/u in 2-3 months or sooner if needed. \par \par \par Etta Virgen, ERIN. \par

## 2020-02-04 NOTE — PHYSICAL EXAM
[General Appearance - Well Developed] : well developed [Normal Appearance] : normal appearance [General Appearance - Well Nourished] : well nourished [Well Groomed] : well groomed [General Appearance - In No Acute Distress] : no acute distress [No Deformities] : no deformities [Eyelids - No Xanthelasma] : the eyelids demonstrated no xanthelasmas [Normal Conjunctiva] : the conjunctiva exhibited no abnormalities [Normal Oral Mucosa] : normal oral mucosa [No Oral Pallor] : no oral pallor [No Oral Cyanosis] : no oral cyanosis [Normal Jugular Venous A Waves Present] : normal jugular venous A waves present [No Jugular Venous Leos A Waves] : no jugular venous leos A waves [Normal Jugular Venous V Waves Present] : normal jugular venous V waves present [Respiration, Rhythm And Depth] : normal respiratory rhythm and effort [Exaggerated Use Of Accessory Muscles For Inspiration] : no accessory muscle use [Heart Sounds] : normal S1 and S2 [Heart Rate And Rhythm] : heart rate and rhythm were normal [Auscultation Breath Sounds / Voice Sounds] : lungs were clear to auscultation bilaterally [Murmurs] : no murmurs present [Abdomen Soft] : soft [Abdomen Mass (___ Cm)] : no abdominal mass palpated [Abdomen Tenderness] : non-tender [Abnormal Walk] : normal gait [Cyanosis, Localized] : no localized cyanosis [Nail Clubbing] : no clubbing of the fingernails [Petechial Hemorrhages (___cm)] : no petechial hemorrhages [Skin Color & Pigmentation] : normal skin color and pigmentation [No Venous Stasis] : no venous stasis [] : no rash [No Skin Ulcers] : no skin ulcer [Skin Lesions] : no skin lesions [No Xanthoma] : no  xanthoma was observed [Oriented To Time, Place, And Person] : oriented to person, place, and time [Mood] : the mood was normal [Affect] : the affect was normal [No Anxiety] : not feeling anxious [FreeTextEntry1] : canot walk on treadmill. afraid to fall

## 2020-02-04 NOTE — HISTORY OF PRESENT ILLNESS
[FreeTextEntry1] : This is 74 year old male with hypertension, DM, TIA/ Stroke to left pontine stroke, s/p ILR implant. reports for follow up visit. His BP is elevated today 150/56, he lost 5 pounds since last month with diet and exercise. Denies any chest pain, SOB, palpitations,  dizziness, leg edema, syncope or near syncope. \par \par

## 2020-03-03 RX ORDER — PIOGLITAZONE HYDROCHLORIDE 45 MG/1
45 TABLET ORAL DAILY
Refills: 0 | Status: ACTIVE | COMMUNITY

## 2020-03-06 ENCOUNTER — APPOINTMENT (OUTPATIENT)
Dept: CARDIOLOGY | Facility: CLINIC | Age: 75
End: 2020-03-06
Payer: MEDICARE

## 2020-03-06 VITALS
SYSTOLIC BLOOD PRESSURE: 152 MMHG | WEIGHT: 226 LBS | OXYGEN SATURATION: 97 % | HEART RATE: 65 BPM | DIASTOLIC BLOOD PRESSURE: 60 MMHG | BODY MASS INDEX: 32.35 KG/M2 | HEIGHT: 70 IN

## 2020-03-06 VITALS — SYSTOLIC BLOOD PRESSURE: 148 MMHG | DIASTOLIC BLOOD PRESSURE: 60 MMHG

## 2020-03-06 PROCEDURE — 99214 OFFICE O/P EST MOD 30 MIN: CPT

## 2020-03-06 PROCEDURE — 93000 ELECTROCARDIOGRAM COMPLETE: CPT

## 2020-03-06 RX ORDER — CARVEDILOL 12.5 MG/1
12.5 TABLET, FILM COATED ORAL
Qty: 180 | Refills: 0 | Status: DISCONTINUED | COMMUNITY
Start: 2020-01-29 | End: 2020-03-06

## 2020-03-10 RX ORDER — ATORVASTATIN CALCIUM 40 MG/1
40 TABLET, FILM COATED ORAL
Qty: 90 | Refills: 3 | Status: ACTIVE | COMMUNITY
Start: 2018-12-19 | End: 1900-01-01

## 2020-03-13 VITALS — DIASTOLIC BLOOD PRESSURE: 56 MMHG | SYSTOLIC BLOOD PRESSURE: 148 MMHG

## 2020-03-13 RX ORDER — AMLODIPINE BESYLATE 10 MG/1
10 TABLET ORAL DAILY
Qty: 90 | Refills: 3 | Status: DISCONTINUED | COMMUNITY
Start: 2017-04-12 | End: 2020-03-13

## 2020-03-20 NOTE — DISCUSSION/SUMMARY
[Patient] : the patient [Risks] : risks [Benefits] : benefits [Alternatives] : alternatives [FreeTextEntry1] : This is a 74 year old male with HTN, DM, aortic atherosclerosis with pontine infarct with pontine infarct, returns for follow uo of HTN who returns for follow up visit. His BP is elevated today,  he has not been taking coreg, because he read about the side effect and decided not to take it. Other than that he  states he feels good, no CP, SOB, palpitations, HA, near syncope or syncope. Started doing more walking and healthy eating habits. \par \par Plan:\par \par 1.  P afib: CHADSVASC 4, SR on EKG today. ct.  Xarelto\par 2.  Left pontine infarct: Cryptogenic. afib and on anticoagulation. ct asa 81 mg daily. ct lipitor,   \par 3.  HTN: elevated today, 158/68, d/c coreg  due to concerned about the side effect. start amlodipine - benazepril 10- 10 mg  daily,  c/w losartan 100 mg to losartan and HCTZ combi 100-25   \par 4. BP check in office next week. goal is <130 systolic.  \par 5. Discussed about lifestyle modifications, counseled on weight loss, decrease carb intake, avoid skipping meals, increase physical activity at least 150 min. moderate physical activity with in 1 week (AHA recommendation). \par 6. f/u in 3 - 4 months or sooner if needed. \par \par \par ERIN Whitman. \par \par This patient was managed independently by [facility].  I was available is issues present.\par \par Lalo Davis MD\par

## 2020-03-20 NOTE — HISTORY OF PRESENT ILLNESS
[FreeTextEntry1] : This is 74 year old male with hypertension, DM, TIA/ Stroke to left pontine stroke, s/p ILR implant. reports for follow up visit. His BP is elevated today 150/56, he lost 5 pounds since last month with diet and exercise. Denies any chest pain, SOB, palpitations,  dizziness, leg edema, syncope or near syncope. \par \par 3/6/2020\par Patient returns for follow up visit. His BP is elevated today,  he has not been taking coreg, because he read about the side effect and decided not to take it. Other than that he  states he feels good, no CP, SOB, palpitations, HA, near syncope or syncope. Started doing more walking and healthy eating habits. \par \par

## 2020-03-20 NOTE — PHYSICAL EXAM
[General Appearance - Well Developed] : well developed [Normal Appearance] : normal appearance [Well Groomed] : well groomed [General Appearance - Well Nourished] : well nourished [No Deformities] : no deformities [General Appearance - In No Acute Distress] : no acute distress [Normal Conjunctiva] : the conjunctiva exhibited no abnormalities [Eyelids - No Xanthelasma] : the eyelids demonstrated no xanthelasmas [Normal Oral Mucosa] : normal oral mucosa [No Oral Pallor] : no oral pallor [No Oral Cyanosis] : no oral cyanosis [Normal Jugular Venous A Waves Present] : normal jugular venous A waves present [Normal Jugular Venous V Waves Present] : normal jugular venous V waves present [No Jugular Venous Leos A Waves] : no jugular venous leos A waves [Respiration, Rhythm And Depth] : normal respiratory rhythm and effort [Exaggerated Use Of Accessory Muscles For Inspiration] : no accessory muscle use [Auscultation Breath Sounds / Voice Sounds] : lungs were clear to auscultation bilaterally [Heart Rate And Rhythm] : heart rate and rhythm were normal [Heart Sounds] : normal S1 and S2 [Murmurs] : no murmurs present [Abdomen Soft] : soft [Abdomen Tenderness] : non-tender [Abdomen Mass (___ Cm)] : no abdominal mass palpated [Abnormal Walk] : normal gait [Nail Clubbing] : no clubbing of the fingernails [Cyanosis, Localized] : no localized cyanosis [Petechial Hemorrhages (___cm)] : no petechial hemorrhages [Skin Color & Pigmentation] : normal skin color and pigmentation [] : no rash [No Venous Stasis] : no venous stasis [Skin Lesions] : no skin lesions [No Skin Ulcers] : no skin ulcer [No Xanthoma] : no  xanthoma was observed [Oriented To Time, Place, And Person] : oriented to person, place, and time [Affect] : the affect was normal [Mood] : the mood was normal [No Anxiety] : not feeling anxious [FreeTextEntry1] : canot walk on treadmill. afraid to fall

## 2020-03-20 NOTE — REVIEW OF SYSTEMS
[Anxiety] : anxiety [Negative] : Heme/Lymph [Shortness Of Breath] : no shortness of breath [Chest Pain] : no chest pain [Palpitations] : no palpitations [Joint Pain] : no joint pain [Dizziness] : no dizziness [Easy Bleeding] : no tendency for easy bleeding [Easy Bruising] : no tendency for easy bruising

## 2020-03-28 ENCOUNTER — INPATIENT (INPATIENT)
Facility: HOSPITAL | Age: 75
LOS: 1 days | Discharge: HOME | DRG: 871 | End: 2020-03-30
Attending: HOSPITALIST | Admitting: HOSPITALIST
Payer: MEDICARE

## 2020-03-28 VITALS
WEIGHT: 220.02 LBS | DIASTOLIC BLOOD PRESSURE: 81 MMHG | SYSTOLIC BLOOD PRESSURE: 152 MMHG | RESPIRATION RATE: 20 BRPM | OXYGEN SATURATION: 93 % | TEMPERATURE: 103 F | HEART RATE: 99 BPM | HEIGHT: 70 IN

## 2020-03-28 DIAGNOSIS — Z98.890 OTHER SPECIFIED POSTPROCEDURAL STATES: Chronic | ICD-10-CM

## 2020-03-28 DIAGNOSIS — B34.9 VIRAL INFECTION, UNSPECIFIED: ICD-10-CM

## 2020-03-28 DIAGNOSIS — Z90.49 ACQUIRED ABSENCE OF OTHER SPECIFIED PARTS OF DIGESTIVE TRACT: Chronic | ICD-10-CM

## 2020-03-28 LAB
ALBUMIN SERPL ELPH-MCNC: 3.6 G/DL — SIGNIFICANT CHANGE UP (ref 3.3–5.2)
ALP SERPL-CCNC: 58 U/L — SIGNIFICANT CHANGE UP (ref 40–120)
ALT FLD-CCNC: 11 U/L — SIGNIFICANT CHANGE UP
ANION GAP SERPL CALC-SCNC: 17 MMOL/L — SIGNIFICANT CHANGE UP (ref 5–17)
APTT BLD: 47.2 SEC — HIGH (ref 27.5–36.3)
APTT BLD: 49.8 SEC — HIGH (ref 27.5–36.3)
AST SERPL-CCNC: 21 U/L — SIGNIFICANT CHANGE UP
BASOPHILS # BLD AUTO: 0.01 K/UL — SIGNIFICANT CHANGE UP (ref 0–0.2)
BASOPHILS # BLD AUTO: 0.02 K/UL — SIGNIFICANT CHANGE UP (ref 0–0.2)
BASOPHILS NFR BLD AUTO: 0.1 % — SIGNIFICANT CHANGE UP (ref 0–2)
BASOPHILS NFR BLD AUTO: 0.2 % — SIGNIFICANT CHANGE UP (ref 0–2)
BILIRUB SERPL-MCNC: 0.5 MG/DL — SIGNIFICANT CHANGE UP (ref 0.4–2)
BUN SERPL-MCNC: 20 MG/DL — SIGNIFICANT CHANGE UP (ref 8–20)
CALCIUM SERPL-MCNC: 8.8 MG/DL — SIGNIFICANT CHANGE UP (ref 8.6–10.2)
CHLORIDE SERPL-SCNC: 101 MMOL/L — SIGNIFICANT CHANGE UP (ref 98–107)
CO2 SERPL-SCNC: 20 MMOL/L — LOW (ref 22–29)
CREAT SERPL-MCNC: 1.29 MG/DL — SIGNIFICANT CHANGE UP (ref 0.5–1.3)
EOSINOPHIL # BLD AUTO: 0 K/UL — SIGNIFICANT CHANGE UP (ref 0–0.5)
EOSINOPHIL # BLD AUTO: 0.13 K/UL — SIGNIFICANT CHANGE UP (ref 0–0.5)
EOSINOPHIL NFR BLD AUTO: 0 % — SIGNIFICANT CHANGE UP (ref 0–6)
EOSINOPHIL NFR BLD AUTO: 1.5 % — SIGNIFICANT CHANGE UP (ref 0–6)
ERYTHROCYTE [SEDIMENTATION RATE] IN BLOOD: 49 MM/HR — HIGH (ref 0–20)
GLUCOSE BLDC GLUCOMTR-MCNC: 137 MG/DL — HIGH (ref 70–99)
GLUCOSE BLDC GLUCOMTR-MCNC: 161 MG/DL — HIGH (ref 70–99)
GLUCOSE BLDC GLUCOMTR-MCNC: 187 MG/DL — HIGH (ref 70–99)
GLUCOSE SERPL-MCNC: 192 MG/DL — HIGH (ref 70–99)
HCT VFR BLD CALC: 36.3 % — LOW (ref 39–50)
HCT VFR BLD CALC: 38.5 % — LOW (ref 39–50)
HGB BLD-MCNC: 12.1 G/DL — LOW (ref 13–17)
HGB BLD-MCNC: 12.3 G/DL — LOW (ref 13–17)
IMM GRANULOCYTES NFR BLD AUTO: 0.3 % — SIGNIFICANT CHANGE UP (ref 0–1.5)
IMM GRANULOCYTES NFR BLD AUTO: 0.3 % — SIGNIFICANT CHANGE UP (ref 0–1.5)
INR BLD: 2.09 RATIO — HIGH (ref 0.88–1.16)
INR BLD: 2.42 RATIO — HIGH (ref 0.88–1.16)
LACTATE BLDV-MCNC: 0.9 MMOL/L — SIGNIFICANT CHANGE UP (ref 0.5–2)
LYMPHOCYTES # BLD AUTO: 1.14 K/UL — SIGNIFICANT CHANGE UP (ref 1–3.3)
LYMPHOCYTES # BLD AUTO: 1.46 K/UL — SIGNIFICANT CHANGE UP (ref 1–3.3)
LYMPHOCYTES # BLD AUTO: 11.5 % — LOW (ref 13–44)
LYMPHOCYTES # BLD AUTO: 16.8 % — SIGNIFICANT CHANGE UP (ref 13–44)
MCHC RBC-ENTMCNC: 28.8 PG — SIGNIFICANT CHANGE UP (ref 27–34)
MCHC RBC-ENTMCNC: 29.2 PG — SIGNIFICANT CHANGE UP (ref 27–34)
MCHC RBC-ENTMCNC: 31.9 GM/DL — LOW (ref 32–36)
MCHC RBC-ENTMCNC: 33.3 GM/DL — SIGNIFICANT CHANGE UP (ref 32–36)
MCV RBC AUTO: 87.5 FL — SIGNIFICANT CHANGE UP (ref 80–100)
MCV RBC AUTO: 90.2 FL — SIGNIFICANT CHANGE UP (ref 80–100)
MONOCYTES # BLD AUTO: 0.96 K/UL — HIGH (ref 0–0.9)
MONOCYTES # BLD AUTO: 1.08 K/UL — HIGH (ref 0–0.9)
MONOCYTES NFR BLD AUTO: 10.9 % — SIGNIFICANT CHANGE UP (ref 2–14)
MONOCYTES NFR BLD AUTO: 11.1 % — SIGNIFICANT CHANGE UP (ref 2–14)
NEUTROPHILS # BLD AUTO: 6.08 K/UL — SIGNIFICANT CHANGE UP (ref 1.8–7.4)
NEUTROPHILS # BLD AUTO: 7.65 K/UL — HIGH (ref 1.8–7.4)
NEUTROPHILS NFR BLD AUTO: 70.2 % — SIGNIFICANT CHANGE UP (ref 43–77)
NEUTROPHILS NFR BLD AUTO: 77.1 % — HIGH (ref 43–77)
PLAT MORPH BLD: NORMAL — SIGNIFICANT CHANGE UP
PLATELET # BLD AUTO: 145 K/UL — LOW (ref 150–400)
PLATELET # BLD AUTO: 185 K/UL — SIGNIFICANT CHANGE UP (ref 150–400)
POTASSIUM SERPL-MCNC: 4.4 MMOL/L — SIGNIFICANT CHANGE UP (ref 3.5–5.3)
POTASSIUM SERPL-SCNC: 4.4 MMOL/L — SIGNIFICANT CHANGE UP (ref 3.5–5.3)
PROT SERPL-MCNC: 6.8 G/DL — SIGNIFICANT CHANGE UP (ref 6.6–8.7)
PROTHROM AB SERPL-ACNC: 24.2 SEC — HIGH (ref 10–12.9)
PROTHROM AB SERPL-ACNC: 28.1 SEC — HIGH (ref 10–12.9)
RAPID RVP RESULT: SIGNIFICANT CHANGE UP
RBC # BLD: 4.15 M/UL — LOW (ref 4.2–5.8)
RBC # BLD: 4.27 M/UL — SIGNIFICANT CHANGE UP (ref 4.2–5.8)
RBC # FLD: 13.9 % — SIGNIFICANT CHANGE UP (ref 10.3–14.5)
RBC # FLD: 14.2 % — SIGNIFICANT CHANGE UP (ref 10.3–14.5)
RBC BLD AUTO: NORMAL — SIGNIFICANT CHANGE UP
SODIUM SERPL-SCNC: 138 MMOL/L — SIGNIFICANT CHANGE UP (ref 135–145)
WBC # BLD: 8.67 K/UL — SIGNIFICANT CHANGE UP (ref 3.8–10.5)
WBC # BLD: 9.92 K/UL — SIGNIFICANT CHANGE UP (ref 3.8–10.5)
WBC # FLD AUTO: 8.67 K/UL — SIGNIFICANT CHANGE UP (ref 3.8–10.5)
WBC # FLD AUTO: 9.92 K/UL — SIGNIFICANT CHANGE UP (ref 3.8–10.5)

## 2020-03-28 PROCEDURE — 99223 1ST HOSP IP/OBS HIGH 75: CPT

## 2020-03-28 PROCEDURE — 12345: CPT | Mod: NC

## 2020-03-28 PROCEDURE — 71045 X-RAY EXAM CHEST 1 VIEW: CPT | Mod: 26

## 2020-03-28 PROCEDURE — 93010 ELECTROCARDIOGRAM REPORT: CPT

## 2020-03-28 PROCEDURE — 99285 EMERGENCY DEPT VISIT HI MDM: CPT | Mod: GC

## 2020-03-28 RX ORDER — ATORVASTATIN CALCIUM 80 MG/1
40 TABLET, FILM COATED ORAL AT BEDTIME
Refills: 0 | Status: DISCONTINUED | OUTPATIENT
Start: 2020-03-28 | End: 2020-03-30

## 2020-03-28 RX ORDER — AMLODIPINE BESYLATE 2.5 MG/1
5 TABLET ORAL DAILY
Refills: 0 | Status: DISCONTINUED | OUTPATIENT
Start: 2020-03-28 | End: 2020-03-28

## 2020-03-28 RX ORDER — ASPIRIN/CALCIUM CARB/MAGNESIUM 324 MG
81 TABLET ORAL DAILY
Refills: 0 | Status: DISCONTINUED | OUTPATIENT
Start: 2020-03-28 | End: 2020-03-30

## 2020-03-28 RX ORDER — AMLODIPINE BESYLATE 2.5 MG/1
0 TABLET ORAL
Qty: 90 | Refills: 0 | DISCHARGE

## 2020-03-28 RX ORDER — METFORMIN HYDROCHLORIDE 850 MG/1
0 TABLET ORAL
Qty: 360 | Refills: 0 | DISCHARGE

## 2020-03-28 RX ORDER — AZITHROMYCIN 500 MG/1
500 TABLET, FILM COATED ORAL DAILY
Refills: 0 | Status: DISCONTINUED | OUTPATIENT
Start: 2020-03-28 | End: 2020-03-30

## 2020-03-28 RX ORDER — METOPROLOL TARTRATE 50 MG
0 TABLET ORAL
Qty: 0 | Refills: 0 | DISCHARGE

## 2020-03-28 RX ORDER — SACCHAROMYCES BOULARDII 250 MG
250 POWDER IN PACKET (EA) ORAL
Refills: 0 | Status: DISCONTINUED | OUTPATIENT
Start: 2020-03-28 | End: 2020-03-30

## 2020-03-28 RX ORDER — DEXTROSE 50 % IN WATER 50 %
25 SYRINGE (ML) INTRAVENOUS ONCE
Refills: 0 | Status: DISCONTINUED | OUTPATIENT
Start: 2020-03-28 | End: 2020-03-30

## 2020-03-28 RX ORDER — CARVEDILOL PHOSPHATE 80 MG/1
12.5 CAPSULE, EXTENDED RELEASE ORAL EVERY 12 HOURS
Refills: 0 | Status: DISCONTINUED | OUTPATIENT
Start: 2020-03-28 | End: 2020-03-30

## 2020-03-28 RX ORDER — METOCLOPRAMIDE HCL 10 MG
10 TABLET ORAL ONCE
Refills: 0 | Status: COMPLETED | OUTPATIENT
Start: 2020-03-28 | End: 2020-03-28

## 2020-03-28 RX ORDER — LOSARTAN POTASSIUM 100 MG/1
0 TABLET, FILM COATED ORAL
Qty: 90 | Refills: 0 | DISCHARGE

## 2020-03-28 RX ORDER — AZITHROMYCIN 500 MG/1
500 TABLET, FILM COATED ORAL ONCE
Refills: 0 | Status: COMPLETED | OUTPATIENT
Start: 2020-03-28 | End: 2020-03-28

## 2020-03-28 RX ORDER — DUTASTERIDE AND TAMSULOSIN HYDROCHLORIDE CAPSULES .5; .4 MG/1; MG/1
1 CAPSULE ORAL
Qty: 0 | Refills: 0 | DISCHARGE

## 2020-03-28 RX ORDER — SODIUM CHLORIDE 9 MG/ML
1000 INJECTION, SOLUTION INTRAVENOUS
Refills: 0 | Status: DISCONTINUED | OUTPATIENT
Start: 2020-03-28 | End: 2020-03-30

## 2020-03-28 RX ORDER — ACETAMINOPHEN 500 MG
650 TABLET ORAL EVERY 6 HOURS
Refills: 0 | Status: DISCONTINUED | OUTPATIENT
Start: 2020-03-28 | End: 2020-03-30

## 2020-03-28 RX ORDER — LISINOPRIL 2.5 MG/1
10 TABLET ORAL DAILY
Refills: 0 | Status: DISCONTINUED | OUTPATIENT
Start: 2020-03-28 | End: 2020-03-28

## 2020-03-28 RX ORDER — ATORVASTATIN CALCIUM 80 MG/1
0 TABLET, FILM COATED ORAL
Qty: 90 | Refills: 0 | DISCHARGE

## 2020-03-28 RX ORDER — GLUCAGON INJECTION, SOLUTION 0.5 MG/.1ML
1 INJECTION, SOLUTION SUBCUTANEOUS ONCE
Refills: 0 | Status: DISCONTINUED | OUTPATIENT
Start: 2020-03-28 | End: 2020-03-30

## 2020-03-28 RX ORDER — RIVAROXABAN 15 MG-20MG
20 KIT ORAL
Refills: 0 | Status: DISCONTINUED | OUTPATIENT
Start: 2020-03-28 | End: 2020-03-30

## 2020-03-28 RX ORDER — CEFTRIAXONE 500 MG/1
1000 INJECTION, POWDER, FOR SOLUTION INTRAMUSCULAR; INTRAVENOUS ONCE
Refills: 0 | Status: COMPLETED | OUTPATIENT
Start: 2020-03-28 | End: 2020-03-28

## 2020-03-28 RX ORDER — CEFTRIAXONE 500 MG/1
1000 INJECTION, POWDER, FOR SOLUTION INTRAMUSCULAR; INTRAVENOUS EVERY 24 HOURS
Refills: 0 | Status: DISCONTINUED | OUTPATIENT
Start: 2020-03-28 | End: 2020-03-30

## 2020-03-28 RX ORDER — LOSARTAN POTASSIUM 100 MG/1
100 TABLET, FILM COATED ORAL DAILY
Refills: 0 | Status: DISCONTINUED | OUTPATIENT
Start: 2020-03-28 | End: 2020-03-30

## 2020-03-28 RX ORDER — AMLODIPINE BESYLATE 2.5 MG/1
1 TABLET ORAL
Qty: 0 | Refills: 0 | DISCHARGE

## 2020-03-28 RX ORDER — ICOSAPENT ETHYL 500 MG/1
1 CAPSULE, LIQUID FILLED ORAL
Qty: 0 | Refills: 0 | DISCHARGE

## 2020-03-28 RX ORDER — DEXTROSE 50 % IN WATER 50 %
15 SYRINGE (ML) INTRAVENOUS ONCE
Refills: 0 | Status: DISCONTINUED | OUTPATIENT
Start: 2020-03-28 | End: 2020-03-30

## 2020-03-28 RX ORDER — RIVAROXABAN 15 MG-20MG
0 KIT ORAL
Qty: 90 | Refills: 0 | DISCHARGE

## 2020-03-28 RX ORDER — SODIUM CHLORIDE 9 MG/ML
1000 INJECTION INTRAMUSCULAR; INTRAVENOUS; SUBCUTANEOUS ONCE
Refills: 0 | Status: COMPLETED | OUTPATIENT
Start: 2020-03-28 | End: 2020-03-28

## 2020-03-28 RX ORDER — ALBUTEROL 90 UG/1
1 AEROSOL, METERED ORAL EVERY 4 HOURS
Refills: 0 | Status: DISCONTINUED | OUTPATIENT
Start: 2020-03-28 | End: 2020-03-30

## 2020-03-28 RX ORDER — PIOGLITAZONE HYDROCHLORIDE 15 MG/1
0 TABLET ORAL
Qty: 90 | Refills: 0 | DISCHARGE

## 2020-03-28 RX ORDER — AMLODIPINE BESYLATE 2.5 MG/1
10 TABLET ORAL DAILY
Refills: 0 | Status: DISCONTINUED | OUTPATIENT
Start: 2020-03-28 | End: 2020-03-30

## 2020-03-28 RX ORDER — AMLODIPINE BESYLATE AND BENAZEPRIL HYDROCHLORIDE 10; 20 MG/1; MG/1
0 CAPSULE ORAL
Qty: 90 | Refills: 0 | DISCHARGE

## 2020-03-28 RX ORDER — DEXTROSE 50 % IN WATER 50 %
12.5 SYRINGE (ML) INTRAVENOUS ONCE
Refills: 0 | Status: DISCONTINUED | OUTPATIENT
Start: 2020-03-28 | End: 2020-03-30

## 2020-03-28 RX ORDER — INSULIN LISPRO 100/ML
VIAL (ML) SUBCUTANEOUS
Refills: 0 | Status: DISCONTINUED | OUTPATIENT
Start: 2020-03-28 | End: 2020-03-30

## 2020-03-28 RX ADMIN — CEFTRIAXONE 100 MILLIGRAM(S): 500 INJECTION, POWDER, FOR SOLUTION INTRAMUSCULAR; INTRAVENOUS at 02:57

## 2020-03-28 RX ADMIN — Medication 2: at 07:54

## 2020-03-28 RX ADMIN — Medication 2: at 19:02

## 2020-03-28 RX ADMIN — RIVAROXABAN 20 MILLIGRAM(S): KIT at 19:03

## 2020-03-28 RX ADMIN — Medication 650 MILLIGRAM(S): at 17:30

## 2020-03-28 RX ADMIN — AZITHROMYCIN 500 MILLIGRAM(S): 500 TABLET, FILM COATED ORAL at 11:39

## 2020-03-28 RX ADMIN — SODIUM CHLORIDE 1000 MILLILITER(S): 9 INJECTION INTRAMUSCULAR; INTRAVENOUS; SUBCUTANEOUS at 02:57

## 2020-03-28 RX ADMIN — Medication 2: at 11:39

## 2020-03-28 RX ADMIN — AMLODIPINE BESYLATE 10 MILLIGRAM(S): 2.5 TABLET ORAL at 06:44

## 2020-03-28 RX ADMIN — Medication 250 MILLIGRAM(S): at 19:03

## 2020-03-28 RX ADMIN — AZITHROMYCIN 255 MILLIGRAM(S): 500 TABLET, FILM COATED ORAL at 04:14

## 2020-03-28 RX ADMIN — LOSARTAN POTASSIUM 100 MILLIGRAM(S): 100 TABLET, FILM COATED ORAL at 06:44

## 2020-03-28 RX ADMIN — CEFTRIAXONE 1000 MILLIGRAM(S): 500 INJECTION, POWDER, FOR SOLUTION INTRAMUSCULAR; INTRAVENOUS at 06:44

## 2020-03-28 RX ADMIN — Medication 650 MILLIGRAM(S): at 17:00

## 2020-03-28 RX ADMIN — SODIUM CHLORIDE 1000 MILLILITER(S): 9 INJECTION INTRAMUSCULAR; INTRAVENOUS; SUBCUTANEOUS at 06:44

## 2020-03-28 RX ADMIN — CARVEDILOL PHOSPHATE 12.5 MILLIGRAM(S): 80 CAPSULE, EXTENDED RELEASE ORAL at 19:03

## 2020-03-28 RX ADMIN — Medication 81 MILLIGRAM(S): at 11:34

## 2020-03-28 RX ADMIN — ATORVASTATIN CALCIUM 40 MILLIGRAM(S): 80 TABLET, FILM COATED ORAL at 21:36

## 2020-03-28 RX ADMIN — Medication 10 MILLIGRAM(S): at 02:57

## 2020-03-28 NOTE — H&P ADULT - NSHPLABSRESULTS_GEN_ALL_CORE
12.3   8.67  )-----------( 145      ( 28 Mar 2020 06:41 )             38.5         03-28    138  |  101  |  20.0  ----------------------------<  192<H>  4.4   |  20.0<L>  |  1.29    Ca    8.8      28 Mar 2020 03:00    TPro  6.8  /  Alb  3.6  /  TBili  0.5  /  DBili  x   /  AST  21  /  ALT  11  /  AlkPhos  58  03-28      CXR (on my read) shows increased intersitial markings and right sided opacity

## 2020-03-28 NOTE — H&P ADULT - HISTORY OF PRESENT ILLNESS
HPI from tele-hospitalist reviewed and confirmed:  "Pt is a 73 y/o M w/PMHx Afib (Xarelto), T2DM, HTN, HLD, Anemia, TIA, CVA who presented to ED due to almost one month of progressively worsening cough, now with persistent fevers, despite antibiotic and steroid therapy with PMD as outpatient. +sick contact in wife, with same symptoms however she recovered"    In addition, pt reporting some watery diarrhea that has been occurring for about 2 weeks now along with his non-productive cough.  Pt denies any dyspnea at rest or exertion.  Pt says he wife wasn’t tested for COVID.  On admission, febrile with Tmax of 102.5, borderline hypoxia with O2 sat of 93 and mild tachycardia with HR in high 90s. Labs notable for left-shift. In ED, received 1L of IVF, ceftriaxone and azithromycin.

## 2020-03-28 NOTE — ED PROVIDER NOTE - ATTENDING CONTRIBUTION TO CARE
75 yo M presents to ED c/o intermittent fever and cough x last 2-3 weeks.  Pt seen by PMD and started on ? po steroids without improvement, but never follow up.  Pt's wife was also ill with similar s/s, but neither was tested for flu or COVID  On arrival pt afebrile with pulse-ox 93% R/A, in no acute resp distress.  throat clear mm moist, Neck supple, Cor Reg, Lungs with coarse BS b/l, Abd soft, NT, Ext FROM, Neuro non-focal.   CXR with RML infiltate  will check labs, BC, start empiric Abx and check RVP and COVID.  With age, comorbidities and borderline pulse ox pt will require admisison

## 2020-03-28 NOTE — H&P ADULT - NSHPPHYSICALEXAM_GEN_ALL_CORE
Vital Signs Last 24 Hrs  T(C): 37.2 (28 Mar 2020 06:44), Max: 39.2 (28 Mar 2020 01:13)  T(F): 99 (28 Mar 2020 06:44), Max: 102.5 (28 Mar 2020 01:13)  HR: 58 (28 Mar 2020 06:44) (58 - 99)  BP: 131/77 (28 Mar 2020 06:44) (131/77 - 158/73)  BP(mean): --  RR: 18 (28 Mar 2020 06:44) (18 - 20)  SpO2: 98% (28 Mar 2020 06:44) (93% - 98%)    GENERAL:  Well-appearing, not in acute distress  EYES:  Clear conjunctiva, extraocular movement intact  ENT: Moist mucous membranes  RESP:  Non-labored breathing pattern, lungs clear to ausculation   CV: Regular rate and rhythm, no murmurs appreciated, no lower extremity edema  GI: Soft, non-tender, non-distended  NEURO: Awake, alert, conversant, non-focal   PSYCH: Calm, cooperative  SKIN: No rash or lesions, warm and dry

## 2020-03-28 NOTE — H&P ADULT - NSICDXPASTMEDICALHX_GEN_ALL_CORE_FT
PAST MEDICAL HISTORY:  Atrial fibrillation paroxysmal on xarelto    BPH (benign prostatic hyperplasia)     CVA (cerebral vascular accident)     Diabetes     History of hypertension     [place] (hard of hearing)     Hyperlipemia

## 2020-03-28 NOTE — H&P ADULT - NSHPSOCIALHISTORY_GEN_ALL_CORE
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

## 2020-03-28 NOTE — ED PROVIDER NOTE - OBJECTIVE STATEMENT
Pt is a 73 y/o M w/PMHx Afib (Xarelto), T2DM, HTN, HLD presents c/o cough and fever x 3 weeks.  Pt states that he has had a persistent cough and fever and was seen by his primary care doctor.  He was given steroids but has not had any improvement in his symptoms.  He states that he doesn't' feel as if he is getting worse, but was prompted by his wife to come to the ED for evaluation.  Denies chest pain, abdominal pain, back pain, shortness of breath, endorses cough, fever, nausea.

## 2020-03-28 NOTE — ED PROVIDER NOTE - NS ED ROS FT
CONSTITUTIONAL: +fevers, no chills  Eyes: No vision changes  Cardiovascular: No Chest pain  Respiratory: No SOB, +cough  Gastrointestinal: No n/v/d, no abd pain  Genitourinary: no dysuria, no hematuria  SKIN: no rashes.  NEURO: no headache, no weakness or numbness  PSYCHIATRIC: no known mental health issues.

## 2020-03-28 NOTE — H&P ADULT - ASSESSMENT
75 y/o M  who presented to ED due to almost one month of progressively worsening cough with watery diarrhea for 2 weeks and intermittent subjective fevers, despite antibiotic and steroid therapy with PMD as outpatient, with fever, tachycardia on admission and CXR showing increased intersitial markings and right sided opacity concerning for PNA, begin admitted with sepsis due to sepsis CAP vs. viral PNA, under evaluation for COVID-19    Sepsis due to suspected CAP +/- viral PNA   admit to monitored +   albuterol HFA q 4 hrs  cont azithromycin and rocephin for now  f/u bld cx, covid, rvp  isolation precautions   covid labs sent by ED, can   inflammatory markers ordered    Diarrhea  Could be related to COVID infection  Monitor, if worsening, consider sending C. diff and GI PCR    DM2  HISS  hold oral medications  HbA1c  POC qAC/hs  Carb restriction   	  HLD/HTN  continue home medications with holding parameters   DASH/TLC/carb rest. diet    Afib with hx of prior TIA  xarelto, coreg, statin, ASA resumed    Prophylactic measure  -Intermittent pneumatic compressions

## 2020-03-28 NOTE — CONSULT NOTE ADULT - SUBJECTIVE AND OBJECTIVE BOX
BOBY EDWARDS  [id number]  [facility] ERHR [id number]    PMD: Dr. Najera  Cardio: Dr. Armas  COVID screening:  Does the patient have a temperature > 100.4? yes  What symptoms does the patient have? Cough:  sob:  Sore Throat:  Runny nose:  Other: +sob, cough, fever  Travel within 14 days? [place]: no  Has the patient had contact with another known case of Covid-19? no   Was a rapid flu performed? no  Was an RVP done? yes  Are you testing for Covid-19? yes   HPI: Pt is a 75 y/o M w/PMHx Afib (Xarelto), T2DM, HTN, HLD, Anemia, TIA, CVA who presented to ED due to almost one month of progressively worsening cough, now with perisistent fevers, despite antibiotic and steroid therapy with PMD as outpatient. + sick contact in wife, with same symptoms however she recovered.     ROS: no cp, palpitations, abd pain, n/v/d, constipation, leg swelling, rash.     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  living situation- living with wife    PMHX:  Viral infection  No pertinent family history in first degree relatives  Family history of MI (myocardial infarction)  MEWS Score  [place] (hard of hearing)  BPH (benign prostatic hyperplasia)  Diabetes  Atrial fibrillation  CVA (cerebral vascular accident)  Peptic ulcer due to Helicobacter pylori  Acute appendicitis, unspecified acute appendicitis type  Hyperlipemia  History of hypertension  Diabetes  Viral syndrome  History of loop recorder  S/P appendectomy  No significant past surgical history  FEVER  COUGH  90+    Home Medications:  amLODIPine 10 mg oral tablet: 1 tab(s) orally once a day (28 Mar 2020 04:50)  aspirin 81 mg oral tablet, chewable: 1 tab(s) orally once a day (28 Mar 2020 04:50)  atorvastatin 40 mg oral tablet: 1 tab(s) orally once a day (28 Mar 2020 04:50)  CARVEDILOL   TAB 12.5MG:  (28 Mar 2020 04:50)  losartan 100 mg oral tablet: 1 tab(s) orally once a day (28 Mar 2020 04:50)  metFORMIN 500 mg oral tablet: 1 tab(s) orally 2 times a day (28 Mar 2020 04:50)  pioglitazone 45 mg oral tablet: orally once a day (28 Mar 2020 04:50)  Xarelto 20 mg oral tablet: 1 tab(s) orally once a day (in the evening) (28 Mar 2020 04:50)    VITALS:  T(C): 38.9 (03-28-20 @ 02:58), Max: 39.2 (03-28-20 @ 01:13)  HR: 80 (03-28-20 @ 02:58) (80 - 99)  BP: 158/73 (03-28-20 @ 02:58) (152/81 - 158/73)  RR: 19 (03-28-20 @ 02:58) (19 - 20)  SpO2: 95% (03-28-20 @ 02:58) (93% - 95%)    PHYSICAL EXAM: (evaluation precludes physical exam. Pertinent physical exam findings as per video conference with  nurse at bedside is as follows with parts of exam which are not possible to be performed by tele hospitalist noted as deferred):  GEN: NAD, resting comfortably in bed, no s/s of respiratory distress  HEENT: NCAT, EOMI  CARDIO: deferred  RESP: deferred  GI: deferred  SKIN: deferred  EXTREMITIES: deferred  NEURO:  - Mental Status:  AAOx3; speech is fluent, no gross neurological focal deficits noted    LABORATORY VALUES:  CBC Full  -  ( 28 Mar 2020 03:00 )  WBC Count : 9.92 K/uL  RBC Count : 4.15 M/uL  Hemoglobin : 12.1 g/dL  Hematocrit : 36.3 %  Platelet Count - Automated : 185 K/uL  Mean Cell Volume : 87.5 fl  Mean Cell Hemoglobin : 29.2 pg  Mean Cell Hemoglobin Concentration : 33.3 gm/dL  Auto Neutrophil # : 7.65 K/uL  Auto Lymphocyte # : 1.14 K/uL  Auto Monocyte # : 1.08 K/uL  Auto Eosinophil # : 0.00 K/uL  Auto Basophil # : 0.02 K/uL  Auto Neutrophil % : 77.1 %  Auto Lymphocyte % : 11.5 %  Auto Monocyte % : 10.9 %  Auto Eosinophil % : 0.0 %  Auto Basophil % : 0.2 %    PT/INR - ( 28 Mar 2020 03:00 )   PT: 28.1 sec;   INR: 2.42 ratio    PTT - ( 28 Mar 2020 03:00 )  PTT:49.8 sec    03-28    138  |  101  |  20.0  ----------------------------<  192<H>  4.4   |  20.0<L>  |  1.29    Ca    8.8      28 Mar 2020 03:00    TPro  6.8  /  Alb  3.6  /  TBili  0.5  /  DBili  x   /  AST  21  /  ALT  11  /  AlkPhos  58  03-28    Radiology findings:  xray prelim of chest-pneumonia, RLL    Medication reconciliation done     Care plan discussed with hospitalist Dr. Pascual      Survey offered to patient: YES, patient accepted

## 2020-03-28 NOTE — ED PROVIDER NOTE - PHYSICAL EXAMINATION
General: coughing intermittently, interactive, well nourished, NAD  HEENT: pupils equal and reactive, normal external ears bilaterally   Cardiac: RRR, no MRG appreciated  Resp: diminished breath sounds lower bilaterally, symmetric chest wall rise  Abd: soft, nontender, nondistended  : no CVA tenderness  Neuro: Moving all extremities  Skin:  normal color for race

## 2020-03-28 NOTE — ED ADULT NURSE NOTE - PMH
Atrial fibrillation  paroxysmal on xarelto  BPH (benign prostatic hyperplasia)    CVA (cerebral vascular accident)    Diabetes    History of hypertension    [place] (hard of hearing)    Hyperlipemia

## 2020-03-28 NOTE — PROGRESS NOTE ADULT - SUBJECTIVE AND OBJECTIVE BOX
BOBY EDWARDS  ----------------------------------------  The patient was seen and evaluated for pneumonia. Reports feeling better. Diarrhea resolved.    Vital Signs Last 24 Hrs  T(C): 36.4 (28 Mar 2020 08:59), Max: 39.2 (28 Mar 2020 01:13)  T(F): 97.6 (28 Mar 2020 08:59), Max: 102.5 (28 Mar 2020 01:13)  HR: 67 (28 Mar 2020 08:59) (58 - 99)  BP: 154/73 (28 Mar 2020 08:59) (131/77 - 158/73)  BP(mean): --  RR: 18 (28 Mar 2020 08:59) (18 - 20)  SpO2: 94% (28 Mar 2020 08:59) (93% - 98%)    CAPILLARY BLOOD GLUCOSE  POCT Blood Glucose.: 187 mg/dL (28 Mar 2020 11:37)    PHYSICAL EXAMINATION:  ----------------------------------------  General appearance: No acute distress, Awake, Alert  HEENT: Normocephalic, Atraumatic, Conjunctiva clear, EOMI  Neck: Supple, No JVD, No tenderness  Lungs: Breath sound equal bilaterally, No wheezes, No rales  Cardiovascular: S1S2, Regular rhythm  Abdomen: Soft, Nontender, Nondistended, No guarding/rebound, Positive bowel sounds  Extremities: No clubbing, No cyanosis, No edema, No calf tenderness  Neuro: Strength equal bilaterally, No tremors  Psychiatric: Appropriate mood, Normal affect    LABORATORY STUDIES:  ----------------------------------------             12.3   8.67  )-----------( 145      ( 28 Mar 2020 06:41 )             38.5     03-28    138  |  101  |  20.0  ----------------------------<  192<H>  4.4   |  20.0<L>  |  1.29    Ca    8.8      28 Mar 2020 03:00    TPro  6.8  /  Alb  3.6  /  TBili  0.5  /  DBili  x   /  AST  21  /  ALT  11  /  AlkPhos  58  03-28    LIVER FUNCTIONS - ( 28 Mar 2020 03:00 )  Alb: 3.6 g/dL / Pro: 6.8 g/dL / ALK PHOS: 58 U/L / ALT: 11 U/L / AST: 21 U/L / GGT: x           PT/INR - ( 28 Mar 2020 06:41 )   PT: 24.2 sec;   INR: 2.09 ratio    PTT - ( 28 Mar 2020 06:41 )  PTT:47.2 sec    MEDICATIONS  (STANDING):  ALBUTerol    90 MICROgram(s) HFA Inhaler 1 Puff(s) Inhalation every 4 hours  amLODIPine   Tablet 10 milliGRAM(s) Oral daily  aspirin  chewable 81 milliGRAM(s) Oral daily  atorvastatin 40 milliGRAM(s) Oral at bedtime  azithromycin   Tablet 500 milliGRAM(s) Oral daily  carvedilol 12.5 milliGRAM(s) Oral every 12 hours  cefTRIAXone   IVPB 1000 milliGRAM(s) IV Intermittent every 24 hours  dextrose 5%. 1000 milliLiter(s) (50 mL/Hr) IV Continuous <Continuous>  dextrose 50% Injectable 12.5 Gram(s) IV Push once  dextrose 50% Injectable 25 Gram(s) IV Push once  dextrose 50% Injectable 25 Gram(s) IV Push once  insulin lispro (HumaLOG) corrective regimen sliding scale   SubCutaneous three times a day before meals  losartan 100 milliGRAM(s) Oral daily  rivaroxaban 20 milliGRAM(s) Oral with dinner    MEDICATIONS  (PRN):  acetaminophen   Tablet .. 650 milliGRAM(s) Oral every 6 hours PRN Temp greater or equal to 38C (100.4F), Mild Pain (1 - 3)  dextrose 40% Gel 15 Gram(s) Oral once PRN Blood Glucose LESS THAN 70 milliGRAM(s)/deciliter  glucagon  Injectable 1 milliGRAM(s) IntraMuscular once PRN Glucose LESS THAN 70 milligrams/deciliter      ASSESSMENT / PLAN:  ----------------------------------------  75 y/o M  who presented to ED due to almost one month of progressively worsening cough with watery diarrhea for 2 weeks and intermittent subjective fevers, despite antibiotic and steroid therapy with PMD as outpatient, with fever, tachycardia on admission and CXR showing increased intersitial markings and right sided opacity concerning for PNA, begin admitted with sepsis due to sepsis CAP vs. viral PNA, under evaluation for COVID-19    Sepsis / Pneumonia - On empiric intravenous antibiotics with ceftriaxone and azithromycin. COVID-19 results to be followed. No hypoxia noted on examination.    Diarrhea - The patient reported improvement and resolution of diarrhea. Tolerated oral intake.    Atrial fibrillation - On rivaroxaban for anticoagulation.    Diabetes - Insulin coverage, close monitoring of blood glucose levels.    Hypertension - Close blood pressure monitoring. On carvedilol and losartan. BOBY EDWARDS  ----------------------------------------  The patient was seen and evaluated for pneumonia. Reports feeling better. Diarrhea resolved.    Vital Signs Last 24 Hrs  T(C): 36.4 (28 Mar 2020 08:59), Max: 39.2 (28 Mar 2020 01:13)  T(F): 97.6 (28 Mar 2020 08:59), Max: 102.5 (28 Mar 2020 01:13)  HR: 67 (28 Mar 2020 08:59) (58 - 99)  BP: 154/73 (28 Mar 2020 08:59) (131/77 - 158/73)  BP(mean): --  RR: 18 (28 Mar 2020 08:59) (18 - 20)  SpO2: 94% (28 Mar 2020 08:59) (93% - 98%)    CAPILLARY BLOOD GLUCOSE  POCT Blood Glucose.: 187 mg/dL (28 Mar 2020 11:37)    PHYSICAL EXAMINATION:  ----------------------------------------  General appearance: No acute distress, Awake, Alert  HEENT: Normocephalic, Atraumatic, Conjunctiva clear, EOMI  Neck: Supple, No JVD, No tenderness  Lungs: Breath sound equal bilaterally, No wheezes, No rales  Cardiovascular: S1S2, Regular rhythm  Abdomen: Soft, Nontender, Nondistended, No guarding/rebound, Positive bowel sounds  Extremities: No clubbing, No cyanosis, No edema, No calf tenderness  Neuro: Strength equal bilaterally, No tremors  Psychiatric: Appropriate mood, Normal affect    LABORATORY STUDIES:  ----------------------------------------             12.3   8.67  )-----------( 145      ( 28 Mar 2020 06:41 )             38.5     03-28    138  |  101  |  20.0  ----------------------------<  192<H>  4.4   |  20.0<L>  |  1.29    Ca    8.8      28 Mar 2020 03:00    TPro  6.8  /  Alb  3.6  /  TBili  0.5  /  DBili  x   /  AST  21  /  ALT  11  /  AlkPhos  58  03-28    LIVER FUNCTIONS - ( 28 Mar 2020 03:00 )  Alb: 3.6 g/dL / Pro: 6.8 g/dL / ALK PHOS: 58 U/L / ALT: 11 U/L / AST: 21 U/L / GGT: x           PT/INR - ( 28 Mar 2020 06:41 )   PT: 24.2 sec;   INR: 2.09 ratio    PTT - ( 28 Mar 2020 06:41 )  PTT:47.2 sec    MEDICATIONS  (STANDING):  ALBUTerol    90 MICROgram(s) HFA Inhaler 1 Puff(s) Inhalation every 4 hours  amLODIPine   Tablet 10 milliGRAM(s) Oral daily  aspirin  chewable 81 milliGRAM(s) Oral daily  atorvastatin 40 milliGRAM(s) Oral at bedtime  azithromycin   Tablet 500 milliGRAM(s) Oral daily  carvedilol 12.5 milliGRAM(s) Oral every 12 hours  cefTRIAXone   IVPB 1000 milliGRAM(s) IV Intermittent every 24 hours  dextrose 5%. 1000 milliLiter(s) (50 mL/Hr) IV Continuous <Continuous>  dextrose 50% Injectable 12.5 Gram(s) IV Push once  dextrose 50% Injectable 25 Gram(s) IV Push once  dextrose 50% Injectable 25 Gram(s) IV Push once  insulin lispro (HumaLOG) corrective regimen sliding scale   SubCutaneous three times a day before meals  losartan 100 milliGRAM(s) Oral daily  rivaroxaban 20 milliGRAM(s) Oral with dinner    MEDICATIONS  (PRN):  acetaminophen   Tablet .. 650 milliGRAM(s) Oral every 6 hours PRN Temp greater or equal to 38C (100.4F), Mild Pain (1 - 3)  dextrose 40% Gel 15 Gram(s) Oral once PRN Blood Glucose LESS THAN 70 milliGRAM(s)/deciliter  glucagon  Injectable 1 milliGRAM(s) IntraMuscular once PRN Glucose LESS THAN 70 milligrams/deciliter      ASSESSMENT / PLAN:  ----------------------------------------  73 y/o M  who presented to ED due to almost one month of progressively worsening cough with watery diarrhea for 2 weeks and intermittent subjective fevers, despite antibiotic and steroid therapy with PMD as outpatient, with fever, tachycardia on admission and CXR showing increased intersitial markings and right sided opacity concerning for PNA, begin admitted with sepsis due to sepsis CAP vs. viral PNA, under evaluation for COVID-19    Sepsis / Pneumonia - On empiric intravenous antibiotics with ceftriaxone and azithromycin. COVID-19 results to be followed. No hypoxia noted on examination. Culture results to be followed.    Diarrhea - The patient reported improvement and resolution of diarrhea. Tolerated oral intake.    Atrial fibrillation - On rivaroxaban for anticoagulation.    Diabetes - Insulin coverage, close monitoring of blood glucose levels.    Hypertension - Close blood pressure monitoring. On carvedilol and losartan.

## 2020-03-28 NOTE — ED ADULT TRIAGE NOTE - CHIEF COMPLAINT QUOTE
Pt complaining of weakness/achey/chills, productive cough, fever at home x2-3 weeks. Pt seen by PMD and given "antibiotics and shot for the throat." Pt complains symptoms are worsening.

## 2020-03-28 NOTE — CONSULT NOTE ADULT - ASSESSMENT
73 y/o M  who presented to ED due to almost one month of progressively worsening cough, now with persistent fevers, despite antibiotic and steroid therapy with PMD as outpatient, admitted with:    CAP, complicated by failed outpatient treatment on tamiflu, zpack, amoxicillin, meeting sepsis criteria  admit to monitored +   albuterol HFA q 4 hrs  rec. ID consult  cont azithromycin and rocephin  f/u bld cx, covid, rvp  isolation precautions   covid labs sent, will hold vit c/thiamine/plaquenil at this time as awaiting results and covid not confirmed at this time and EKG pending  lactate <2    DMII  HISS  hold oral medications  HbA1c  POC qAC/hs  Carb restriction   	  HLD/HTN  continue home medications with holding parameters   DASH/TLC/carb rest. diet  EKG PENDING    PAF/CVA/TIA  cont xarelto  cont coreg  cont statin   cont ASA

## 2020-03-28 NOTE — ED ADULT NURSE NOTE - OBJECTIVE STATEMENT
patient present to ED with fevers, sob, and cough x 3 week. no none covid-19 person contgact/ recent travel. PMHx Afib, DM, HTN, HLD . Patient presents to ED A/Ox4, VSS, denies chest pain or sob.  Respirations are even and unlabored, lungs cta, +bowel x4 quads, abdomen soft, nontender/nondistended, skin w/d/i.

## 2020-03-29 LAB
GLUCOSE BLDC GLUCOMTR-MCNC: 168 MG/DL — HIGH (ref 70–99)
GLUCOSE BLDC GLUCOMTR-MCNC: 177 MG/DL — HIGH (ref 70–99)
GLUCOSE BLDC GLUCOMTR-MCNC: 191 MG/DL — HIGH (ref 70–99)
GLUCOSE BLDC GLUCOMTR-MCNC: 194 MG/DL — HIGH (ref 70–99)
HBA1C BLD-MCNC: 7.6 % — HIGH (ref 4–5.6)
SARS-COV-2 RNA SPEC QL NAA+PROBE: DETECTED

## 2020-03-29 PROCEDURE — 99233 SBSQ HOSP IP/OBS HIGH 50: CPT

## 2020-03-29 RX ADMIN — CEFTRIAXONE 100 MILLIGRAM(S): 500 INJECTION, POWDER, FOR SOLUTION INTRAMUSCULAR; INTRAVENOUS at 06:01

## 2020-03-29 RX ADMIN — AZITHROMYCIN 500 MILLIGRAM(S): 500 TABLET, FILM COATED ORAL at 14:46

## 2020-03-29 RX ADMIN — Medication 250 MILLIGRAM(S): at 18:37

## 2020-03-29 RX ADMIN — LOSARTAN POTASSIUM 100 MILLIGRAM(S): 100 TABLET, FILM COATED ORAL at 06:02

## 2020-03-29 RX ADMIN — Medication 250 MILLIGRAM(S): at 06:02

## 2020-03-29 RX ADMIN — Medication 650 MILLIGRAM(S): at 06:40

## 2020-03-29 RX ADMIN — Medication 81 MILLIGRAM(S): at 14:46

## 2020-03-29 RX ADMIN — RIVAROXABAN 20 MILLIGRAM(S): KIT at 18:37

## 2020-03-29 RX ADMIN — Medication 650 MILLIGRAM(S): at 18:37

## 2020-03-29 RX ADMIN — Medication 2: at 08:59

## 2020-03-29 RX ADMIN — Medication 650 MILLIGRAM(S): at 06:08

## 2020-03-29 RX ADMIN — CARVEDILOL PHOSPHATE 12.5 MILLIGRAM(S): 80 CAPSULE, EXTENDED RELEASE ORAL at 06:02

## 2020-03-29 RX ADMIN — AMLODIPINE BESYLATE 10 MILLIGRAM(S): 2.5 TABLET ORAL at 06:02

## 2020-03-29 RX ADMIN — CARVEDILOL PHOSPHATE 12.5 MILLIGRAM(S): 80 CAPSULE, EXTENDED RELEASE ORAL at 18:37

## 2020-03-29 RX ADMIN — ATORVASTATIN CALCIUM 40 MILLIGRAM(S): 80 TABLET, FILM COATED ORAL at 22:15

## 2020-03-29 NOTE — PROGRESS NOTE ADULT - SUBJECTIVE AND OBJECTIVE BOX
BOBY EDWARDS  ----------------------------------------  The patient was seen and evaluated for pneumonia. Reports feeling well but has limited appetite.    Vital Signs Last 24 Hrs  T(C): 37.1 (29 Mar 2020 08:06), Max: 39.8 (28 Mar 2020 16:20)  T(F): 98.7 (29 Mar 2020 08:06), Max: 103.6 (28 Mar 2020 16:20)  HR: 58 (29 Mar 2020 08:06) (58 - 86)  BP: 97/54 (29 Mar 2020 08:06) (97/54 - 163/73)  BP(mean): --  RR: 21 (29 Mar 2020 08:06) (20 - 25)  SpO2: 96% (29 Mar 2020 08:06) (93% - 100%)    CAPILLARY BLOOD GLUCOSE  POCT Blood Glucose.: 191 mg/dL (29 Mar 2020 08:12)  POCT Blood Glucose.: 137 mg/dL (28 Mar 2020 21:21)  POCT Blood Glucose.: 161 mg/dL (28 Mar 2020 18:34)  POCT Blood Glucose.: 187 mg/dL (28 Mar 2020 11:37)    PHYSICAL EXAMINATION:  ----------------------------------------  General appearance: No acute distress, Awake, Alert  HEENT: Normocephalic, Atraumatic, Conjunctiva clear, EOMI  Neck: Supple, No JVD, No tenderness  Lungs: Breath sound equal bilaterally, No wheezes, No rales  Cardiovascular: S1S2, Regular rhythm  Abdomen: Soft, Nontender, Nondistended, No guarding/rebound, Positive bowel sounds  Extremities: No clubbing, No cyanosis, No edema, No calf tenderness  Neuro: Strength equal bilaterally, No tremors  Psychiatric: Appropriate mood, Normal affect    LABORATORY STUDIES:  ----------------------------------------             12.3   8.67  )-----------( 145      ( 28 Mar 2020 06:41 )             38.5     03-28    138  |  101  |  20.0  ----------------------------<  192<H>  4.4   |  20.0<L>  |  1.29    Ca    8.8      28 Mar 2020 03:00    TPro  6.8  /  Alb  3.6  /  TBili  0.5  /  DBili  x   /  AST  21  /  ALT  11  /  AlkPhos  58  03-28    LIVER FUNCTIONS - ( 28 Mar 2020 03:00 )  Alb: 3.6 g/dL / Pro: 6.8 g/dL / ALK PHOS: 58 U/L / ALT: 11 U/L / AST: 21 U/L / GGT: x           PT/INR - ( 28 Mar 2020 06:41 )   PT: 24.2 sec;   INR: 2.09 ratio    PTT - ( 28 Mar 2020 06:41 )  PTT:47.2 sec    MEDICATIONS  (STANDING):  ALBUTerol    90 MICROgram(s) HFA Inhaler 1 Puff(s) Inhalation every 4 hours  amLODIPine   Tablet 10 milliGRAM(s) Oral daily  aspirin  chewable 81 milliGRAM(s) Oral daily  atorvastatin 40 milliGRAM(s) Oral at bedtime  azithromycin   Tablet 500 milliGRAM(s) Oral daily  carvedilol 12.5 milliGRAM(s) Oral every 12 hours  cefTRIAXone   IVPB 1000 milliGRAM(s) IV Intermittent every 24 hours  dextrose 5%. 1000 milliLiter(s) (50 mL/Hr) IV Continuous <Continuous>  dextrose 50% Injectable 12.5 Gram(s) IV Push once  dextrose 50% Injectable 25 Gram(s) IV Push once  dextrose 50% Injectable 25 Gram(s) IV Push once  insulin lispro (HumaLOG) corrective regimen sliding scale   SubCutaneous three times a day before meals  losartan 100 milliGRAM(s) Oral daily  rivaroxaban 20 milliGRAM(s) Oral with dinner  saccharomyces boulardii 250 milliGRAM(s) Oral two times a day    MEDICATIONS  (PRN):  acetaminophen   Tablet .. 650 milliGRAM(s) Oral every 6 hours PRN Temp greater or equal to 38C (100.4F), Mild Pain (1 - 3)  dextrose 40% Gel 15 Gram(s) Oral once PRN Blood Glucose LESS THAN 70 milliGRAM(s)/deciliter  glucagon  Injectable 1 milliGRAM(s) IntraMuscular once PRN Glucose LESS THAN 70 milligrams/deciliter      ASSESSMENT / PLAN:  ----------------------------------------  75 y/o M  who presented to ED due to almost one month of progressively worsening cough with watery diarrhea for 2 weeks and intermittent subjective fevers, despite antibiotic and steroid therapy with PMD as outpatient, with fever, tachycardia on admission and CXR showing increased intersitial markings and right sided opacity concerning for PNA, begin admitted with sepsis due to sepsis CAP vs. viral PNA, under evaluation for COVID-19    Sepsis / Pneumonia - Recurrent fever noted. Awaiting COVID-19 results. On empiric intravenous antibiotics with ceftriaxone and azithromycin. No hypoxia noted on examination. Oxygen saturation 95% on ambient air at rest. Culture results to be followed.    Diarrhea - Improved. Minimal diarrhea. Tolerated oral intake.    Atrial fibrillation - On rivaroxaban for anticoagulation.    Diabetes - Insulin coverage, close monitoring of blood glucose levels.    Hypertension - Close blood pressure monitoring. On carvedilol and losartan.

## 2020-03-30 ENCOUNTER — TRANSCRIPTION ENCOUNTER (OUTPATIENT)
Age: 75
End: 2020-03-30

## 2020-03-30 VITALS
HEART RATE: 72 BPM | RESPIRATION RATE: 18 BRPM | TEMPERATURE: 101 F | SYSTOLIC BLOOD PRESSURE: 133 MMHG | OXYGEN SATURATION: 91 % | DIASTOLIC BLOOD PRESSURE: 71 MMHG

## 2020-03-30 LAB
GLUCOSE BLDC GLUCOMTR-MCNC: 181 MG/DL — HIGH (ref 70–99)
GLUCOSE BLDC GLUCOMTR-MCNC: 203 MG/DL — HIGH (ref 70–99)

## 2020-03-30 PROCEDURE — 84145 PROCALCITONIN (PCT): CPT

## 2020-03-30 PROCEDURE — 86140 C-REACTIVE PROTEIN: CPT

## 2020-03-30 PROCEDURE — 84484 ASSAY OF TROPONIN QUANT: CPT

## 2020-03-30 PROCEDURE — 87581 M.PNEUMON DNA AMP PROBE: CPT

## 2020-03-30 PROCEDURE — 80053 COMPREHEN METABOLIC PANEL: CPT

## 2020-03-30 PROCEDURE — 85652 RBC SED RATE AUTOMATED: CPT

## 2020-03-30 PROCEDURE — 71045 X-RAY EXAM CHEST 1 VIEW: CPT

## 2020-03-30 PROCEDURE — 96375 TX/PRO/DX INJ NEW DRUG ADDON: CPT

## 2020-03-30 PROCEDURE — 83735 ASSAY OF MAGNESIUM: CPT

## 2020-03-30 PROCEDURE — 93005 ELECTROCARDIOGRAM TRACING: CPT

## 2020-03-30 PROCEDURE — 87040 BLOOD CULTURE FOR BACTERIA: CPT

## 2020-03-30 PROCEDURE — 87486 CHLMYD PNEUM DNA AMP PROBE: CPT

## 2020-03-30 PROCEDURE — 87635 SARS-COV-2 COVID-19 AMP PRB: CPT

## 2020-03-30 PROCEDURE — 83036 HEMOGLOBIN GLYCOSYLATED A1C: CPT

## 2020-03-30 PROCEDURE — 82962 GLUCOSE BLOOD TEST: CPT

## 2020-03-30 PROCEDURE — 99285 EMERGENCY DEPT VISIT HI MDM: CPT | Mod: 25

## 2020-03-30 PROCEDURE — 85730 THROMBOPLASTIN TIME PARTIAL: CPT

## 2020-03-30 PROCEDURE — 36415 COLL VENOUS BLD VENIPUNCTURE: CPT

## 2020-03-30 PROCEDURE — 85610 PROTHROMBIN TIME: CPT

## 2020-03-30 PROCEDURE — 99239 HOSP IP/OBS DSCHRG MGMT >30: CPT

## 2020-03-30 PROCEDURE — 96374 THER/PROPH/DIAG INJ IV PUSH: CPT

## 2020-03-30 PROCEDURE — 85027 COMPLETE CBC AUTOMATED: CPT

## 2020-03-30 PROCEDURE — 82728 ASSAY OF FERRITIN: CPT

## 2020-03-30 PROCEDURE — 87633 RESP VIRUS 12-25 TARGETS: CPT

## 2020-03-30 PROCEDURE — 87798 DETECT AGENT NOS DNA AMP: CPT

## 2020-03-30 PROCEDURE — 82550 ASSAY OF CK (CPK): CPT

## 2020-03-30 PROCEDURE — 83605 ASSAY OF LACTIC ACID: CPT

## 2020-03-30 PROCEDURE — 84100 ASSAY OF PHOSPHORUS: CPT

## 2020-03-30 RX ORDER — CEFPODOXIME PROXETIL 100 MG
1 TABLET ORAL
Qty: 6 | Refills: 0
Start: 2020-03-30 | End: 2020-04-01

## 2020-03-30 RX ORDER — AZITHROMYCIN 500 MG/1
1 TABLET, FILM COATED ORAL
Qty: 3 | Refills: 0
Start: 2020-03-30 | End: 2020-04-01

## 2020-03-30 RX ADMIN — AZITHROMYCIN 500 MILLIGRAM(S): 500 TABLET, FILM COATED ORAL at 16:25

## 2020-03-30 RX ADMIN — CEFTRIAXONE 100 MILLIGRAM(S): 500 INJECTION, POWDER, FOR SOLUTION INTRAMUSCULAR; INTRAVENOUS at 06:45

## 2020-03-30 RX ADMIN — Medication 4: at 09:13

## 2020-03-30 RX ADMIN — CARVEDILOL PHOSPHATE 12.5 MILLIGRAM(S): 80 CAPSULE, EXTENDED RELEASE ORAL at 06:46

## 2020-03-30 RX ADMIN — Medication 650 MILLIGRAM(S): at 06:46

## 2020-03-30 RX ADMIN — LOSARTAN POTASSIUM 100 MILLIGRAM(S): 100 TABLET, FILM COATED ORAL at 06:46

## 2020-03-30 RX ADMIN — Medication 81 MILLIGRAM(S): at 16:25

## 2020-03-30 RX ADMIN — AMLODIPINE BESYLATE 10 MILLIGRAM(S): 2.5 TABLET ORAL at 06:46

## 2020-03-30 RX ADMIN — Medication 250 MILLIGRAM(S): at 06:46

## 2020-03-30 NOTE — DISCHARGE NOTE PROVIDER - NSDCCPCAREPLAN_GEN_ALL_CORE_FT
PRINCIPAL DISCHARGE DIAGNOSIS  Diagnosis: Viral syndrome  Assessment and Plan of Treatment: Continue with quarantine protocols.      SECONDARY DISCHARGE DIAGNOSES  Diagnosis: Diabetes  Assessment and Plan of Treatment: Resume your home diabetes medications. Monitor glucose levels.    Diagnosis: Hypertension  Assessment and Plan of Treatment: Continue on your cardiac medications.

## 2020-03-30 NOTE — DISCHARGE NOTE NURSING/CASE MANAGEMENT/SOCIAL WORK - NSDCPETBCESMAN_GEN_ALL_CORE
If you are a smoker, it is important for your health to stop smoking. Please be aware that second hand smoke is also harmful. [address]

## 2020-03-30 NOTE — DISCHARGE NOTE PROVIDER - HOSPITAL COURSE
HPI    "Pt is a 73 y/o M w/PMHx Afib (Xarelto), T2DM, HTN, HLD, Anemia, TIA, CVA who presented to ED due to almost one month of progressively worsening cough, now with persistent fevers, despite antibiotic and steroid therapy with PMD as outpatient. +sick contact in wife, with same symptoms however she recovered"    In addition, pt reporting some watery diarrhea that has been occurring for about 2 weeks now along with his non-productive cough.  Pt denies any dyspnea at rest or exertion.  Pt says he wife wasn’t tested for COVID.  On admission, febrile with Tmax of 102.5, borderline hypoxia with O2 sat of 93 and mild tachycardia with HR in high 90s. Labs notable for left-shift. In ED, received 1L of IVF, ceftriaxone and azithromycin.        The patient was admitted to the hospital and treated for pneumonia. COVID-19 was noted to be positive and the patient monitored closely. He had no hypoxia and did not require supplemental oxygen. The patient reported significant improvement in his symptoms and was able to ambulate without difficulty and tolerated an oral diet with resolution of the diarrhea. He was discharged home with instructions to complete the course of antibiotics and to follow up with his primary care physician for further management.            39 minutes total time

## 2020-03-30 NOTE — DISCHARGE NOTE NURSING/CASE MANAGEMENT/SOCIAL WORK - PATIENT PORTAL LINK FT
You can access the FollowMyHealth Patient Portal offered by [facility] by registering at the following website: [email or web address]. By joining [employer]’s FollowMyHealth portal, you will also be able to view your health information using other applications (apps) compatible with our system.

## 2020-03-30 NOTE — DISCHARGE NOTE PROVIDER - NSDCMRMEDTOKEN_GEN_ALL_CORE_FT
amLODIPine 10 mg oral tablet: 1 tab(s) orally once a day  aspirin 81 mg oral tablet, chewable: 1 tab(s) orally once a day  atorvastatin 40 mg oral tablet: 1 tab(s) orally once a day  azithromycin 500 mg oral tablet: 1 tab(s) orally once a day   CARVEDILOL   TAB 12.5MG:   cefpodoxime 200 mg oral tablet: 1 tab(s) orally every 12 hours   losartan 100 mg oral tablet: 1 tab(s) orally once a day  metFORMIN 500 mg oral tablet: 1 tab(s) orally 2 times a day  pioglitazone 45 mg oral tablet: orally once a day  Xarelto 20 mg oral tablet: 1 tab(s) orally once a day (in the evening)

## 2020-03-30 NOTE — DISCHARGE NOTE PROVIDER - NSDCFUADDINST_GEN_ALL_CORE_FT
It has been determined that you no longer need hospitalization and can recover while remaining in self-quarantine at home for at least 14 days. You should follow the prevention steps below until a healthcare provider or [facility] department says you can return to your normal activities.  Please remain in quarantine until then. You should restrict activities outside your home except for getting medical care.  Do not go to work, school or public areas.  Avoid using public transportation.  Separate yourself from other people and animals in your home.  Cover your coughs and sneezes.  Clean your hands often. Avoid sharing personal household items. Clean all high touch surfaces. Monitor your symptoms, if you have a medical emergency call 911.  Further detailed instructions can be obtained on http://www.cdc.gov

## 2020-04-02 LAB
CULTURE RESULTS: SIGNIFICANT CHANGE UP
CULTURE RESULTS: SIGNIFICANT CHANGE UP
SPECIMEN SOURCE: SIGNIFICANT CHANGE UP
SPECIMEN SOURCE: SIGNIFICANT CHANGE UP

## 2020-04-06 ENCOUNTER — INPATIENT (INPATIENT)
Facility: HOSPITAL | Age: 75
LOS: 10 days | Discharge: HOME | DRG: 177 | End: 2020-04-17
Attending: INTERNAL MEDICINE | Admitting: HOSPITALIST
Payer: MEDICARE

## 2020-04-06 VITALS
RESPIRATION RATE: 24 BRPM | WEIGHT: 199.96 LBS | OXYGEN SATURATION: 85 % | DIASTOLIC BLOOD PRESSURE: 79 MMHG | SYSTOLIC BLOOD PRESSURE: 160 MMHG | TEMPERATURE: 99 F | HEIGHT: 70 IN | HEART RATE: 108 BPM

## 2020-04-06 DIAGNOSIS — Z90.49 ACQUIRED ABSENCE OF OTHER SPECIFIED PARTS OF DIGESTIVE TRACT: Chronic | ICD-10-CM

## 2020-04-06 DIAGNOSIS — J96.01 ACUTE RESPIRATORY FAILURE WITH HYPOXIA: ICD-10-CM

## 2020-04-06 DIAGNOSIS — Z98.890 OTHER SPECIFIED POSTPROCEDURAL STATES: Chronic | ICD-10-CM

## 2020-04-06 LAB
ALBUMIN SERPL ELPH-MCNC: 3.1 G/DL — LOW (ref 3.3–5.2)
ALP SERPL-CCNC: 76 U/L — SIGNIFICANT CHANGE UP (ref 40–120)
ALT FLD-CCNC: 21 U/L — SIGNIFICANT CHANGE UP
ANION GAP SERPL CALC-SCNC: 18 MMOL/L — HIGH (ref 5–17)
AST SERPL-CCNC: 27 U/L — SIGNIFICANT CHANGE UP
BASOPHILS # BLD AUTO: 0.02 K/UL — SIGNIFICANT CHANGE UP (ref 0–0.2)
BASOPHILS NFR BLD AUTO: 0.2 % — SIGNIFICANT CHANGE UP (ref 0–2)
BILIRUB SERPL-MCNC: 0.5 MG/DL — SIGNIFICANT CHANGE UP (ref 0.4–2)
BUN SERPL-MCNC: 28 MG/DL — HIGH (ref 8–20)
CALCIUM SERPL-MCNC: 9.6 MG/DL — SIGNIFICANT CHANGE UP (ref 8.6–10.2)
CHLORIDE SERPL-SCNC: 100 MMOL/L — SIGNIFICANT CHANGE UP (ref 98–107)
CO2 SERPL-SCNC: 19 MMOL/L — LOW (ref 22–29)
CREAT SERPL-MCNC: 1.36 MG/DL — HIGH (ref 0.5–1.3)
CRP SERPL-MCNC: 15.84 MG/DL — HIGH (ref 0–0.4)
D DIMER BLD IA.RAPID-MCNC: HIGH NG/ML DDU
EOSINOPHIL # BLD AUTO: 0.07 K/UL — SIGNIFICANT CHANGE UP (ref 0–0.5)
EOSINOPHIL NFR BLD AUTO: 0.6 % — SIGNIFICANT CHANGE UP (ref 0–6)
ERYTHROCYTE [SEDIMENTATION RATE] IN BLOOD: 49 MM/HR — HIGH (ref 0–20)
FERRITIN SERPL-MCNC: 2168 NG/ML — HIGH (ref 30–400)
GLUCOSE SERPL-MCNC: 304 MG/DL — HIGH (ref 70–99)
HCT VFR BLD CALC: 35.8 % — LOW (ref 39–50)
HGB BLD-MCNC: 11.9 G/DL — LOW (ref 13–17)
IMM GRANULOCYTES NFR BLD AUTO: 0.9 % — SIGNIFICANT CHANGE UP (ref 0–1.5)
LDH SERPL L TO P-CCNC: 505 U/L — HIGH (ref 98–192)
LYMPHOCYTES # BLD AUTO: 0.85 K/UL — LOW (ref 1–3.3)
LYMPHOCYTES # BLD AUTO: 7.6 % — LOW (ref 13–44)
MCHC RBC-ENTMCNC: 28.7 PG — SIGNIFICANT CHANGE UP (ref 27–34)
MCHC RBC-ENTMCNC: 33.2 GM/DL — SIGNIFICANT CHANGE UP (ref 32–36)
MCV RBC AUTO: 86.3 FL — SIGNIFICANT CHANGE UP (ref 80–100)
MONOCYTES # BLD AUTO: 0.87 K/UL — SIGNIFICANT CHANGE UP (ref 0–0.9)
MONOCYTES NFR BLD AUTO: 7.7 % — SIGNIFICANT CHANGE UP (ref 2–14)
NEUTROPHILS # BLD AUTO: 9.34 K/UL — HIGH (ref 1.8–7.4)
NEUTROPHILS NFR BLD AUTO: 83 % — HIGH (ref 43–77)
PLATELET # BLD AUTO: 189 K/UL — SIGNIFICANT CHANGE UP (ref 150–400)
POTASSIUM SERPL-MCNC: 3.8 MMOL/L — SIGNIFICANT CHANGE UP (ref 3.5–5.3)
POTASSIUM SERPL-SCNC: 3.8 MMOL/L — SIGNIFICANT CHANGE UP (ref 3.5–5.3)
PROT SERPL-MCNC: 7 G/DL — SIGNIFICANT CHANGE UP (ref 6.6–8.7)
RBC # BLD: 4.15 M/UL — LOW (ref 4.2–5.8)
RBC # FLD: 14.2 % — SIGNIFICANT CHANGE UP (ref 10.3–14.5)
SODIUM SERPL-SCNC: 137 MMOL/L — SIGNIFICANT CHANGE UP (ref 135–145)
WBC # BLD: 11.25 K/UL — HIGH (ref 3.8–10.5)
WBC # FLD AUTO: 11.25 K/UL — HIGH (ref 3.8–10.5)

## 2020-04-06 PROCEDURE — 93010 ELECTROCARDIOGRAM REPORT: CPT

## 2020-04-06 PROCEDURE — 99285 EMERGENCY DEPT VISIT HI MDM: CPT

## 2020-04-06 PROCEDURE — 71045 X-RAY EXAM CHEST 1 VIEW: CPT | Mod: 26

## 2020-04-06 PROCEDURE — 99223 1ST HOSP IP/OBS HIGH 75: CPT

## 2020-04-06 RX ORDER — ASCORBIC ACID 60 MG
500 TABLET,CHEWABLE ORAL THREE TIMES A DAY
Refills: 0 | Status: DISCONTINUED | OUTPATIENT
Start: 2020-04-06 | End: 2020-04-17

## 2020-04-06 RX ORDER — RIVAROXABAN 15 MG-20MG
1 KIT ORAL
Qty: 0 | Refills: 0 | DISCHARGE

## 2020-04-06 RX ORDER — THIAMINE MONONITRATE (VIT B1) 100 MG
100 TABLET ORAL DAILY
Refills: 0 | Status: DISCONTINUED | OUTPATIENT
Start: 2020-04-06 | End: 2020-04-17

## 2020-04-06 RX ORDER — HYDROXYCHLOROQUINE SULFATE 200 MG
200 TABLET ORAL EVERY 12 HOURS
Refills: 0 | Status: COMPLETED | OUTPATIENT
Start: 2020-04-07 | End: 2020-04-11

## 2020-04-06 RX ORDER — INSULIN LISPRO 100/ML
VIAL (ML) SUBCUTANEOUS
Refills: 0 | Status: DISCONTINUED | OUTPATIENT
Start: 2020-04-06 | End: 2020-04-08

## 2020-04-06 RX ORDER — PIOGLITAZONE HYDROCHLORIDE 15 MG/1
0 TABLET ORAL
Qty: 0 | Refills: 0 | DISCHARGE

## 2020-04-06 RX ORDER — AMLODIPINE BESYLATE 2.5 MG/1
10 TABLET ORAL DAILY
Refills: 0 | Status: DISCONTINUED | OUTPATIENT
Start: 2020-04-06 | End: 2020-04-17

## 2020-04-06 RX ORDER — AMLODIPINE BESYLATE 2.5 MG/1
1 TABLET ORAL
Qty: 0 | Refills: 0 | DISCHARGE

## 2020-04-06 RX ORDER — CARVEDILOL PHOSPHATE 80 MG/1
0 CAPSULE, EXTENDED RELEASE ORAL
Qty: 180 | Refills: 0 | DISCHARGE

## 2020-04-06 RX ORDER — GUAIFENESIN/DEXTROMETHORPHAN 600MG-30MG
10 TABLET, EXTENDED RELEASE 12 HR ORAL ONCE
Refills: 0 | Status: COMPLETED | OUTPATIENT
Start: 2020-04-06 | End: 2020-04-06

## 2020-04-06 RX ORDER — HYDROXYCHLOROQUINE SULFATE 200 MG
TABLET ORAL
Refills: 0 | Status: COMPLETED | OUTPATIENT
Start: 2020-04-06 | End: 2020-04-11

## 2020-04-06 RX ORDER — DEXTROSE 50 % IN WATER 50 %
25 SYRINGE (ML) INTRAVENOUS ONCE
Refills: 0 | Status: DISCONTINUED | OUTPATIENT
Start: 2020-04-06 | End: 2020-04-08

## 2020-04-06 RX ORDER — ATORVASTATIN CALCIUM 80 MG/1
40 TABLET, FILM COATED ORAL AT BEDTIME
Refills: 0 | Status: DISCONTINUED | OUTPATIENT
Start: 2020-04-06 | End: 2020-04-17

## 2020-04-06 RX ORDER — RIVAROXABAN 15 MG-20MG
20 KIT ORAL
Refills: 0 | Status: DISCONTINUED | OUTPATIENT
Start: 2020-04-06 | End: 2020-04-17

## 2020-04-06 RX ORDER — ASPIRIN/CALCIUM CARB/MAGNESIUM 324 MG
81 TABLET ORAL DAILY
Refills: 0 | Status: DISCONTINUED | OUTPATIENT
Start: 2020-04-06 | End: 2020-04-17

## 2020-04-06 RX ORDER — CEFTRIAXONE 500 MG/1
1000 INJECTION, POWDER, FOR SOLUTION INTRAMUSCULAR; INTRAVENOUS EVERY 24 HOURS
Refills: 0 | Status: DISCONTINUED | OUTPATIENT
Start: 2020-04-06 | End: 2020-04-07

## 2020-04-06 RX ORDER — DEXTROSE 50 % IN WATER 50 %
12.5 SYRINGE (ML) INTRAVENOUS ONCE
Refills: 0 | Status: DISCONTINUED | OUTPATIENT
Start: 2020-04-06 | End: 2020-04-08

## 2020-04-06 RX ORDER — AZITHROMYCIN 500 MG/1
500 TABLET, FILM COATED ORAL EVERY 24 HOURS
Refills: 0 | Status: DISCONTINUED | OUTPATIENT
Start: 2020-04-06 | End: 2020-04-07

## 2020-04-06 RX ORDER — DEXTROSE 50 % IN WATER 50 %
15 SYRINGE (ML) INTRAVENOUS ONCE
Refills: 0 | Status: DISCONTINUED | OUTPATIENT
Start: 2020-04-06 | End: 2020-04-08

## 2020-04-06 RX ORDER — GLUCAGON INJECTION, SOLUTION 0.5 MG/.1ML
1 INJECTION, SOLUTION SUBCUTANEOUS ONCE
Refills: 0 | Status: DISCONTINUED | OUTPATIENT
Start: 2020-04-06 | End: 2020-04-17

## 2020-04-06 RX ORDER — METFORMIN HYDROCHLORIDE 850 MG/1
1 TABLET ORAL
Qty: 0 | Refills: 0 | DISCHARGE

## 2020-04-06 RX ORDER — SODIUM CHLORIDE 9 MG/ML
1000 INJECTION, SOLUTION INTRAVENOUS
Refills: 0 | Status: DISCONTINUED | OUTPATIENT
Start: 2020-04-06 | End: 2020-04-08

## 2020-04-06 RX ORDER — ALBUTEROL 90 UG/1
4 AEROSOL, METERED ORAL ONCE
Refills: 0 | Status: COMPLETED | OUTPATIENT
Start: 2020-04-06 | End: 2020-04-06

## 2020-04-06 RX ORDER — ATORVASTATIN CALCIUM 80 MG/1
1 TABLET, FILM COATED ORAL
Qty: 0 | Refills: 0 | DISCHARGE

## 2020-04-06 RX ORDER — ALBUTEROL 90 UG/1
1 AEROSOL, METERED ORAL EVERY 4 HOURS
Refills: 0 | Status: DISCONTINUED | OUTPATIENT
Start: 2020-04-06 | End: 2020-04-17

## 2020-04-06 RX ORDER — ASPIRIN/CALCIUM CARB/MAGNESIUM 324 MG
1 TABLET ORAL
Qty: 0 | Refills: 0 | DISCHARGE

## 2020-04-06 RX ORDER — LOSARTAN POTASSIUM 100 MG/1
100 TABLET, FILM COATED ORAL DAILY
Refills: 0 | Status: DISCONTINUED | OUTPATIENT
Start: 2020-04-06 | End: 2020-04-17

## 2020-04-06 RX ORDER — HYDROXYCHLOROQUINE SULFATE 200 MG
400 TABLET ORAL EVERY 12 HOURS
Refills: 0 | Status: COMPLETED | OUTPATIENT
Start: 2020-04-06 | End: 2020-04-07

## 2020-04-06 RX ADMIN — Medication 400 MILLIGRAM(S): at 18:33

## 2020-04-06 RX ADMIN — RIVAROXABAN 20 MILLIGRAM(S): KIT at 18:33

## 2020-04-06 RX ADMIN — ALBUTEROL 4 PUFF(S): 90 AEROSOL, METERED ORAL at 12:25

## 2020-04-06 RX ADMIN — Medication 10 MILLILITER(S): at 12:25

## 2020-04-06 RX ADMIN — CEFTRIAXONE 100 MILLIGRAM(S): 500 INJECTION, POWDER, FOR SOLUTION INTRAMUSCULAR; INTRAVENOUS at 16:10

## 2020-04-06 RX ADMIN — Medication 90 MILLIGRAM(S): at 18:33

## 2020-04-06 RX ADMIN — AZITHROMYCIN 255 MILLIGRAM(S): 500 TABLET, FILM COATED ORAL at 16:40

## 2020-04-06 NOTE — ED PROVIDER NOTE - PMH
Atrial fibrillation  paroxysmal on xarelto  BPH (benign prostatic hyperplasia)    CVA (cerebral vascular accident)    Diabetes    History of hypertension    [place] (hard of hearing)    Hyperlipemia

## 2020-04-06 NOTE — ED PROVIDER NOTE - OBJECTIVE STATEMENT
74 year old male with PMH HTN, HLD, afib on xarelto, CVA presents with SOB. Pt reports that he has had SOB x 2 weeks with fevers to Tmax 102. He was seen here, tested posted for covid and discharged. He reports progressive worsening SOB, worse with exertion. Denies chets pain, LE edema, hemoptysis, abd pain, fever, chills.

## 2020-04-06 NOTE — H&P ADULT - NSHPLABSRESULTS_GEN_ALL_CORE
CBC Full  -  ( 06 Apr 2020 12:28 )  WBC Count : 11.25 K/uL  RBC Count : 4.15 M/uL  Hemoglobin : 11.9 g/dL  Hematocrit : 35.8 %  Platelet Count - Automated : 189 K/uL  Mean Cell Volume : 86.3 fl  Mean Cell Hemoglobin : 28.7 pg  Mean Cell Hemoglobin Concentration : 33.2 gm/dL  Auto Neutrophil # : 9.34 K/uL  Auto Lymphocyte # : 0.85 K/uL  Auto Monocyte # : 0.87 K/uL  Auto Eosinophil # : 0.07 K/uL  Auto Basophil # : 0.02 K/uL  Auto Neutrophil % : 83.0 %  Auto Lymphocyte % : 7.6 %  Auto Monocyte % : 7.7 %  Auto Eosinophil % : 0.6 %  Auto Basophil % : 0.2 %  04-06    137  |  100  |  28.0<H>  ----------------------------<  304<H>  3.8   |  19.0<L>  |  1.36<H>    Ca    9.6      06 Apr 2020 12:28    TPro  7.0  /  Alb  3.1<L>  /  TBili  0.5  /  DBili  x   /  AST  27  /  ALT  21  /  AlkPhos  76  04-06    C-Reactive Protein, Serum: 15.84 mg/dL (04.06.20 @ 12:28)  Lactate Dehydrogenase, Serum: 505 U/L (04.06.20 @ 12:28)  Ferritin, Serum: 2168 ng/mL (04.06.20 @ 12:28)  Hemoglobin: 11.9 g/dL (04.06.20 @ 12:28)  D-Dimer Assay, Quantitative: 98975 ng/mL DDU (04.06.20 @ 12:28)    COVID-19 PCR . (03.29.20 @ 00:01)    COVID-19 PCR: Detected    Radiology  < from: Xray Chest 1 View AP/PA. (04.06.20 @ 13:36) >    FINDINGS:    Single frontal view of the chest demonstrates diffuse bilateral lower lobe infiltrates. The cardiomediastinal silhouette is enlarged. No acute osseous abnormalities.    IMPRESSION: Diffuse bilateral lower lobe infiltrates.

## 2020-04-06 NOTE — H&P ADULT - NSICDXPASTMEDICALHX_GEN_ALL_CORE_FT
PAST MEDICAL HISTORY:  Atrial fibrillation paroxysmal on xarelto    BPH (benign prostatic hyperplasia)     CVA (cerebral vascular accident)     Diabetes     History of hypertension     [place] (hard of hearing)     Hyperlipemia

## 2020-04-06 NOTE — H&P ADULT - ASSESSMENT
75 y/o M w/ PMHx Afib (Xarelto), T2DM, HTN, HLD, Anemia, TIA, CVA who presented to ED due to acute hypoxic respiratory distress likely secondary to COVID pneumonia.     Hypoxic Respiratory Failure, Secondary to Suspected Viral PNA       Atrial fibrillation   On rivaroxaban for anticoagulation.    Diabetes  Insulin coverage, close monitoring of blood glucose levels.    Hypertension  Close blood pressure monitoring. On carvedilol and losartan.    Prophylactic Measure  Adequately anticoagulated with Xarelto    Code Status: full code 73 y/o M w/ PMHx Afib (Xarelto), T2DM, HTN, HLD, Anemia, TIA, CVA who presented to ED due to acute hypoxic respiratory distress likely secondary to COVID pneumonia.     Hypoxic Respiratory Failure, Secondary to Suspected Viral PNA   COVID19 positive  Inflammatory markers elevated  CXR: b/l pneumonia  c/w Plaquenil protocol  c/w Solumedrol 1mg/kg BID  c/w Vitamin C 500mg TID  c/w Thiamine 200mg QD  c/w albuterol prn  c/w tessalon perles prn for cough  if patient's hypoxic status further deteriorates, will have low threshold to escalate care.    Atrial fibrillation   rate controlled  Recently stopped Coreg, stated it was too strong for him  CHADS2 score: 5  HAS-BLED: 4  c/w ASA  c/w rivaroxaban for anticoagulation    Diabetes  c/w premeal and QHS FS  c/w mod ISS  HbA1c: 11.9% (4/6/20)  Insulin coverage, close monitoring of blood glucose levels    Hypertension  Close blood pressure monitoring  c/w Losartan 100 QD    Hx of CVA  no new neurological symptoms  c/w statin    Prophylactic Measure  Adequately anticoagulated with Xarelto    Code Status: full code    Emergency contact: Minal (wife) [phone] 73 y/o M w/ PMHx Afib (Xarelto), T2DM, HTN, HLD, Anemia, TIA, CVA who presented to ED due to acute hypoxic respiratory distress likely secondary to COVID pneumonia versus superimposed bacterial pneumonia     Hypoxic Respiratory Failure, Secondary to Pneumonia, viral  COVID19 positive  Inflammatory markers elevated  CXR: b/l pneumonia  c/w Plaquenil protocol  c/w Solumedrol 1mg/kg BID  c/w Vitamin C 500mg TID  c/w Thiamine 200mg QD  c/w albuterol prn  c/w tessalon perles prn for cough  if patient's hypoxic status further deteriorates, will have low threshold to escalate care.    Atrial fibrillation   rate controlled  Recently stopped Coreg, stated it was too strong for him  CHADS2 score: 5  HAS-BLED: 4  c/w ASA  c/w rivaroxaban for anticoagulation    HOSSEIN, likely pre-renal  Encourage oral fluid intake;   Monitor renal functions    Diabetes  c/w premeal and QHS FS  c/w mod ISS  HbA1c: 11.9% (4/6/20)  Insulin coverage, close monitoring of blood glucose levels    Hypertension  Close blood pressure monitoring  c/w Losartan 100 QD    Hx of CVA  no new neurological symptoms  c/w statin    Prophylactic Measure  Adequately anticoagulated with Xarelto    Code Status: full code    Emergency contact: Minal (wife) [phone]

## 2020-04-06 NOTE — ED PROVIDER NOTE - CARE PLAN
Principal Discharge DX:	Acute respiratory failure with hypoxia  Secondary Diagnosis:	Pneumonia due to 2019 novel coronavirus

## 2020-04-06 NOTE — H&P ADULT - HISTORY OF PRESENT ILLNESS
75 y/o M w/ PMHx Afib (Xarelto), T2DM, HTN, HLD, Anemia, TIA, CVA who presented to ED due to SOB for 2 weeks and fever of 102F. He has also been having diarrhea for 2 weeks on/off. He was admitted from 03/28 to 03/30 for pneumonia and was tested positive for COVID during that admission. He did not have any hypoxia during that time and did not require supplemental oxygen. He was treated with Azithro and Rocephin during that hospitalization.  Patient denies HA, dizziness, LOC, CP, cough, Abd pain, N/V, dysuria.

## 2020-04-06 NOTE — ED ADULT NURSE NOTE - PMH
Atrial fibrillation  paroxysmal on xarelto  BPH (benign prostatic hyperplasia)    CVA (cerebral vascular accident)    Diabetes    History of hypertension    [place] (hard of hearing)    Hyperlipemia

## 2020-04-06 NOTE — H&P ADULT - NSHPPHYSICALEXAM_GEN_ALL_CORE
Vitals  T(C): 37.2 (04-06-20 @ 10:53), Max: 37.2 (04-06-20 @ 10:53)  HR: 108 (04-06-20 @ 10:53) (108 - 108)  BP: 160/79 (04-06-20 @ 10:53) (160/79 - 160/79)  RR: 24 (04-06-20 @ 10:53) (24 - 24)  SpO2: 93% (04-06-20 @ 11:06) (85% - 93%)  Wt(kg): --    Physical Exam:   GENERAL: mild respiratory distress  HEAD:  Atraumatic, Normocephalic  EYES: EOMI, PERRLA, conjunctiva and sclera clear  ENMT: No tonsillar erythema, exudates, or enlargement; Moist mucous membranes, Good dentition, No lesions  NECK: Supple, No JVD, Normal thyroid  NERVOUS SYSTEM:  Alert & Oriented X3, Good concentration; Motor Strength 5/5 B/L upper and lower extremities;   RESP: Clear to auscultation bilaterally; No rales, rhonchi, or wheezing  CVS: Regular rate and rhythm; No murmurs appreciated  GI: Soft, Nontender, Nondistended; Bowel sounds present  EXTREMITIES:  2+ Peripheral Pulses, No clubbing, cyanosis, or edema  LYMPH: No cervical or supraclavicular lymphadenopathy noted  SKIN: No rashes or lesions Vitals  T(C): 37.2 (04-06-20 @ 10:53), Max: 37.2 (04-06-20 @ 10:53)  HR: 108 (04-06-20 @ 10:53) (108 - 108)  BP: 160/79 (04-06-20 @ 10:53) (160/79 - 160/79)  RR: 24 (04-06-20 @ 10:53) (24 - 24)  SpO2: 93% (04-06-20 @ 11:06) (85% - 93%)  Wt(kg): --    Physical Exam:   GENERAL: mild respiratory distress  HEAD:  Atraumatic, Normocephalic  EYES: EOMI, conjunctiva and sclera clear  NECK: Supple, No JVD, Normal thyroid  NERVOUS SYSTEM:  Alert & Oriented X3, Good concentration; Motor Strength 5/5 B/L upper and lower extremities;   RESP: b/l lower lobe crackles/rales  CVS: Regular rate and rhythm; No murmurs appreciated  GI: Soft, Nontender, Nondistended; Bowel sounds present  EXTREMITIES:  2+ Peripheral Pulses, No clubbing, cyanosis, or edema  LYMPH: No cervical or supraclavicular lymphadenopathy noted  SKIN: No rashes or lesions Vitals  T(C): 37.2 (04-06-20 @ 10:53), Max: 37.2 (04-06-20 @ 10:53)  HR: 108 (04-06-20 @ 10:53) (108 - 108)  BP: 160/79 (04-06-20 @ 10:53) (160/79 - 160/79)  RR: 24 (04-06-20 @ 10:53) (24 - 24)  SpO2: 93% (04-06-20 @ 11:06) (85% - 93%)  Wt(kg): --    Physical Exam:   GENERAL: Obese, mild respiratory distress  HEAD:  Atraumatic, Normocephalic  EYES: EOMI, conjunctiva and sclera clear  NECK: Supple, No JVD, Normal thyroid  NERVOUS SYSTEM:  Alert & Oriented X3, Good concentration; Motor Strength 5/5 B/L upper and lower extremities;   RESP: b/l lower lobe crackles/rales  CVS: Regular rate and rhythm; No murmurs appreciated  GI: Soft, Nontender, Nondistended; Bowel sounds present  EXTREMITIES:  2+ Peripheral Pulses, No clubbing, cyanosis, or edema  LYMPH: No cervical or supraclavicular lymphadenopathy noted  SKIN: No rashes or lesions

## 2020-04-07 LAB
ALBUMIN SERPL ELPH-MCNC: 3.1 G/DL — LOW (ref 3.3–5.2)
ALP SERPL-CCNC: 82 U/L — SIGNIFICANT CHANGE UP (ref 40–120)
ALT FLD-CCNC: 33 U/L — SIGNIFICANT CHANGE UP
ANION GAP SERPL CALC-SCNC: 19 MMOL/L — HIGH (ref 5–17)
AST SERPL-CCNC: 37 U/L — SIGNIFICANT CHANGE UP
BASOPHILS # BLD AUTO: 0.02 K/UL — SIGNIFICANT CHANGE UP (ref 0–0.2)
BASOPHILS NFR BLD AUTO: 0.1 % — SIGNIFICANT CHANGE UP (ref 0–2)
BILIRUB SERPL-MCNC: 0.2 MG/DL — LOW (ref 0.4–2)
BUN SERPL-MCNC: 39 MG/DL — HIGH (ref 8–20)
CALCIUM SERPL-MCNC: 9.6 MG/DL — SIGNIFICANT CHANGE UP (ref 8.6–10.2)
CHLORIDE SERPL-SCNC: 100 MMOL/L — SIGNIFICANT CHANGE UP (ref 98–107)
CO2 SERPL-SCNC: 20 MMOL/L — LOW (ref 22–29)
CREAT SERPL-MCNC: 1.39 MG/DL — HIGH (ref 0.5–1.3)
CRP SERPL-MCNC: 9.68 MG/DL — HIGH (ref 0–0.4)
D DIMER BLD IA.RAPID-MCNC: HIGH NG/ML DDU
EOSINOPHIL # BLD AUTO: 0 K/UL — SIGNIFICANT CHANGE UP (ref 0–0.5)
EOSINOPHIL NFR BLD AUTO: 0 % — SIGNIFICANT CHANGE UP (ref 0–6)
FERRITIN SERPL-MCNC: 2291 NG/ML — HIGH (ref 30–400)
GLUCOSE BLDC GLUCOMTR-MCNC: 353 MG/DL — HIGH (ref 70–99)
GLUCOSE SERPL-MCNC: 405 MG/DL — HIGH (ref 70–99)
HCT VFR BLD CALC: 35.9 % — LOW (ref 39–50)
HGB BLD-MCNC: 11.7 G/DL — LOW (ref 13–17)
IMM GRANULOCYTES NFR BLD AUTO: 1.7 % — HIGH (ref 0–1.5)
LYMPHOCYTES # BLD AUTO: 0.72 K/UL — LOW (ref 1–3.3)
LYMPHOCYTES # BLD AUTO: 3.8 % — LOW (ref 13–44)
MCHC RBC-ENTMCNC: 28.8 PG — SIGNIFICANT CHANGE UP (ref 27–34)
MCHC RBC-ENTMCNC: 32.6 GM/DL — SIGNIFICANT CHANGE UP (ref 32–36)
MCV RBC AUTO: 88.4 FL — SIGNIFICANT CHANGE UP (ref 80–100)
MONOCYTES # BLD AUTO: 0.3 K/UL — SIGNIFICANT CHANGE UP (ref 0–0.9)
MONOCYTES NFR BLD AUTO: 1.6 % — LOW (ref 2–14)
NEUTROPHILS # BLD AUTO: 17.51 K/UL — HIGH (ref 1.8–7.4)
NEUTROPHILS NFR BLD AUTO: 92.8 % — HIGH (ref 43–77)
PLATELET # BLD AUTO: 222 K/UL — SIGNIFICANT CHANGE UP (ref 150–400)
POTASSIUM SERPL-MCNC: 4.4 MMOL/L — SIGNIFICANT CHANGE UP (ref 3.5–5.3)
POTASSIUM SERPL-SCNC: 4.4 MMOL/L — SIGNIFICANT CHANGE UP (ref 3.5–5.3)
PROT SERPL-MCNC: 7.2 G/DL — SIGNIFICANT CHANGE UP (ref 6.6–8.7)
RBC # BLD: 4.06 M/UL — LOW (ref 4.2–5.8)
RBC # FLD: 14.3 % — SIGNIFICANT CHANGE UP (ref 10.3–14.5)
SODIUM SERPL-SCNC: 139 MMOL/L — SIGNIFICANT CHANGE UP (ref 135–145)
WBC # BLD: 18.88 K/UL — HIGH (ref 3.8–10.5)
WBC # FLD AUTO: 18.88 K/UL — HIGH (ref 3.8–10.5)

## 2020-04-07 PROCEDURE — 99233 SBSQ HOSP IP/OBS HIGH 50: CPT

## 2020-04-07 PROCEDURE — 99223 1ST HOSP IP/OBS HIGH 75: CPT

## 2020-04-07 RX ORDER — PIPERACILLIN AND TAZOBACTAM 4; .5 G/20ML; G/20ML
3.38 INJECTION, POWDER, LYOPHILIZED, FOR SOLUTION INTRAVENOUS EVERY 8 HOURS
Refills: 0 | Status: DISCONTINUED | OUTPATIENT
Start: 2020-04-07 | End: 2020-04-08

## 2020-04-07 RX ORDER — PIPERACILLIN AND TAZOBACTAM 4; .5 G/20ML; G/20ML
3.38 INJECTION, POWDER, LYOPHILIZED, FOR SOLUTION INTRAVENOUS ONCE
Refills: 0 | Status: COMPLETED | OUTPATIENT
Start: 2020-04-07 | End: 2020-04-07

## 2020-04-07 RX ADMIN — Medication 500 MILLIGRAM(S): at 00:18

## 2020-04-07 RX ADMIN — ATORVASTATIN CALCIUM 40 MILLIGRAM(S): 80 TABLET, FILM COATED ORAL at 00:18

## 2020-04-07 RX ADMIN — Medication 10: at 08:05

## 2020-04-07 RX ADMIN — PIPERACILLIN AND TAZOBACTAM 200 GRAM(S): 4; .5 INJECTION, POWDER, LYOPHILIZED, FOR SOLUTION INTRAVENOUS at 16:33

## 2020-04-07 RX ADMIN — Medication 400 MILLIGRAM(S): at 07:22

## 2020-04-07 RX ADMIN — Medication 500 MILLIGRAM(S): at 16:37

## 2020-04-07 RX ADMIN — RIVAROXABAN 20 MILLIGRAM(S): KIT at 16:33

## 2020-04-07 RX ADMIN — Medication 10: at 11:19

## 2020-04-07 RX ADMIN — Medication 100 MILLIGRAM(S): at 12:27

## 2020-04-07 RX ADMIN — Medication 90 MILLIGRAM(S): at 18:59

## 2020-04-07 RX ADMIN — Medication 81 MILLIGRAM(S): at 12:27

## 2020-04-07 RX ADMIN — AMLODIPINE BESYLATE 10 MILLIGRAM(S): 2.5 TABLET ORAL at 12:26

## 2020-04-07 RX ADMIN — PIPERACILLIN AND TAZOBACTAM 25 GRAM(S): 4; .5 INJECTION, POWDER, LYOPHILIZED, FOR SOLUTION INTRAVENOUS at 22:30

## 2020-04-07 RX ADMIN — Medication 6: at 16:34

## 2020-04-07 RX ADMIN — ATORVASTATIN CALCIUM 40 MILLIGRAM(S): 80 TABLET, FILM COATED ORAL at 22:30

## 2020-04-07 RX ADMIN — Medication 90 MILLIGRAM(S): at 12:27

## 2020-04-07 RX ADMIN — LOSARTAN POTASSIUM 100 MILLIGRAM(S): 100 TABLET, FILM COATED ORAL at 12:27

## 2020-04-07 RX ADMIN — Medication 500 MILLIGRAM(S): at 22:30

## 2020-04-07 RX ADMIN — Medication 200 MILLIGRAM(S): at 16:33

## 2020-04-07 RX ADMIN — Medication 500 MILLIGRAM(S): at 12:26

## 2020-04-07 NOTE — PROGRESS NOTE ADULT - ASSESSMENT
75 y/o M w/ PMHx Afib (Xarelto), T2DM, HTN, HLD, Anemia, TIA, CVA who presented to ED due to acute hypoxic respiratory distress likely secondary to COVID pneumonia versus superimposed bacterial pneumonia     Hypoxic Respiratory Failure, Secondary to Pneumonia, viral - Interval worsening as now requiring NRB  COVID19 positive  Inflammatory markers elevated  CXR: b/l pneumonia  c/w Plaquenil protocol  c/w Solumedrol 1mg/kg BID  c/w Vitamin C 500mg TID  c/w Thiamine 200mg QD  c/w albuterol prn  c/w tessalon perles prn for cough  Started on IV antibiotics by ID      Atrial fibrillation   rate controlled  Recently stopped Coreg, stated it was too strong for him  CHADS2 score: 5  HAS-BLED: 4  c/w ASA  c/w rivaroxaban for anticoagulation    HOSSEIN, likely pre-renal  Encourage oral fluid intake;   Monitor renal functions (labs pending)    Diabetes  c/w premeal and QHS FS  c/w mod ISS  HbA1c: 11.9% (4/6/20)  Insulin coverage, close monitoring of blood glucose levels    Hypertension  Close blood pressure monitoring  c/w Losartan 100 QD    Hx of CVA  no new neurological symptoms  c/w statin    Prophylactic Measure  Adequately anticoagulated with Xarelto      Prognosis - guarded  Code Status: full code  Disposition - Pending clinical course        Emergency contact: Minal (wife) [phone]

## 2020-04-07 NOTE — CONSULT NOTE ADULT - ASSESSMENT
75 y/o M w/ PMHx Afib (Xarelto), T2DM, HTN, HLD, Anemia, TIA, CVA who presented to ED due to SOB for 2 weeks and fever of 102F. He has also been having diarrhea for 2 weeks on/off. He was admitted from 03/28 to 03/30 for pneumonia and was tested positive for COVID during that admission. He did not have any hypoxia during that time and did not require supplemental oxygen. He was treated with Azithro and Rocephin during that hospitalization.      COVID 19 + infection  Viral pneumonia  Hypoxia  HOSSEIN  r/o HAP      - COVID 19 PCR= +  - CXR= diffuse b/l infiltrates  - QTc= 444 ms  - Procalcitonin= not reliable in the setting of renal failure  - Inflammatory markers= elevated  - Baseline NLR calculation for risk stratification=   10-->24      (NLR <3 low vs >5 high)  - Data regarding treatment of COVID 19 is rapidly evolving. Optimized supportive care remains the mainstay of therapy  - Limited data to support Hydroxychloroquine +/- Azithromycin, will need to monitor for  QT prolongation,  hepatotoxicity, hypoglycemia when using these agents  - Hydroxychloroquine off label use explained and patient consents to use- start 400 mg po q12h x 1 day then 200 mg po q12h x 4 days   - Dc ceftriaxone and start azithromycin  - Ct chest  - Monitor QTc  - Check CBC with diff, CMP with LFT's, Inflammatory markers (ESR, CRP, Ferritin, LDH,  procalcitonin)  q48h.  D dimer, lactate, troponin, CK, PT/PTT x 1  - Steroids may be considered in ARDS as per Surviving Sepsis COVID guidelines-c/w for now  - May consider vitamin C, thiamine and zinc - note lack of evidence to support benefit with COVID 19  - Encourage self proning q2h and ambulation as tolerated  - Taper off O2 as tolerated- once tolerating room air would ideally monitor for 24h prior to discharge. Continue self quarantine at home x 14 days from date of positive COVID 19 PCR  - Inpatient contact/airborne isolation  - If clinically deteriorating with worsening hypoxia and impending ARDS- please send cytokine panel, quantiferon gold, hepatitis panel. Will consider for Interleukin inhibitor on a case by case basis- limited data out of [place] to support use, ongoing clinical trials in the [place].      Discussed with team.  Will follow 75 y/o M w/ PMHx Afib (Xarelto), T2DM, HTN, HLD, Anemia, TIA, CVA who presented to ED due to SOB for 2 weeks and fever of 102F. He has also been having diarrhea for 2 weeks on/off. He was admitted from 03/28 to 03/30 for pneumonia and was tested positive for COVID during that admission. He did not have any hypoxia during that time and did not require supplemental oxygen. He was treated with Azithro and Rocephin during that hospitalization.      COVID 19 + infection  Viral pneumonia  Hypoxia  HOSSEIN  r/o HAP      - COVID 19 PCR= +  - CXR= diffuse b/l infiltrates  - QTc= 444 ms  - Procalcitonin= not reliable in the setting of renal failure  - Inflammatory markers= elevated  - Baseline NLR calculation for risk stratification=   10-->24      (NLR <3 low vs >5 high)  - Data regarding treatment of COVID 19 is rapidly evolving. Optimized supportive care remains the mainstay of therapy  - Limited data to support Hydroxychloroquine +/- Azithromycin, will need to monitor for  QT prolongation,  hepatotoxicity, hypoglycemia when using these agents  - Hydroxychloroquine off label use explained and patient consents to use- start 400 mg po q12h x 1 day then 200 mg po q12h x 4 days   - Dc ceftriaxone and zosyn  - Ct chest  - Monitor QTc  - Check CBC with diff, CMP with LFT's, Inflammatory markers (ESR, CRP, Ferritin, LDH,  procalcitonin)  q48h.  D dimer, lactate, troponin, CK, PT/PTT x 1  - Steroids may be considered in ARDS as per Surviving Sepsis COVID guidelines-c/w for now  - May consider vitamin C, thiamine and zinc - note lack of evidence to support benefit with COVID 19  - Encourage self proning q2h and ambulation as tolerated  - Taper off O2 as tolerated- once tolerating room air would ideally monitor for 24h prior to discharge. Continue self quarantine at home x 14 days from date of positive COVID 19 PCR  - Inpatient contact/airborne isolation  - If clinically deteriorating with worsening hypoxia and impending ARDS- please send cytokine panel, quantiferon gold, hepatitis panel. Will consider for Interleukin inhibitor on a case by case basis- limited data out of [place] to support use, ongoing clinical trials in the [place].      Discussed with team.  Will follow

## 2020-04-07 NOTE — CONSULT NOTE ADULT - SUBJECTIVE AND OBJECTIVE BOX
[facility]  INFECTIOUS DISEASES AND INTERNAL MEDICINE at [place]  =======================================================  Jerry Sommer MD  Diplomates American Board of Internal Medicine and Infectious Diseases  Tel: [phone]      Fax: [phone]  =======================================================      [id number]  BOBY EDWARDS is a 74y  Male     CC: Patient is a 74y old  Male who presents with a chief complaint of Dyspnea (06 Apr 2020 16:56)    HPI:  73 y/o M w/ PMHx Afib (Xarelto), T2DM, HTN, HLD, Anemia, TIA, CVA who presented to ED due to SOB for 2 weeks and fever of 102F. He has also been having diarrhea for 2 weeks on/off. He was admitted from 03/28 to 03/30 for pneumonia and was tested positive for COVID during that admission. He did not have any hypoxia during that time and did not require supplemental oxygen. He was treated with Azithro and Rocephin during that hospitalization.  Patient denies HA, dizziness, LOC, CP, cough, Abd pain, N/V, dysuria. (06 Apr 2020 16:56)      PAST MEDICAL & SURGICAL HISTORY:  [place] (hard of hearing)  BPH (benign prostatic hyperplasia)  Diabetes  Atrial fibrillation: paroxysmal on xarelto  CVA (cerebral vascular accident)  Hyperlipemia  History of hypertension  History of loop recorder  S/P appendectomy      Social Hx:    FAMILY HISTORY:  No pertinent family history in first degree relatives      Allergies    No Known Allergies    Intolerances        MEDICATIONS  (STANDING):  amLODIPine   Tablet 10 milliGRAM(s) Oral daily  ascorbic acid 500 milliGRAM(s) Oral three times a day  aspirin  chewable 81 milliGRAM(s) Oral daily  atorvastatin 40 milliGRAM(s) Oral at bedtime  azithromycin  IVPB 500 milliGRAM(s) IV Intermittent every 24 hours  cefTRIAXone   IVPB 1000 milliGRAM(s) IV Intermittent every 24 hours  dextrose 5%. 1000 milliLiter(s) (50 mL/Hr) IV Continuous <Continuous>  dextrose 50% Injectable 12.5 Gram(s) IV Push once  dextrose 50% Injectable 25 Gram(s) IV Push once  dextrose 50% Injectable 25 Gram(s) IV Push once  hydroxychloroquine   Oral   hydroxychloroquine 200 milliGRAM(s) Oral every 12 hours  insulin lispro (HumaLOG) corrective regimen sliding scale   SubCutaneous three times a day before meals  losartan 100 milliGRAM(s) Oral daily  methylPREDNISolone sodium succinate Injectable 90 milliGRAM(s) IV Push two times a day  rivaroxaban 20 milliGRAM(s) Oral with dinner  thiamine 100 milliGRAM(s) Oral daily    MEDICATIONS  (PRN):  ALBUTerol    90 MICROgram(s) HFA Inhaler 1 Puff(s) Inhalation every 4 hours PRN Shortness of Breath and/or Wheezing  benzonatate 100 milliGRAM(s) Oral three times a day PRN Cough  dextrose 40% Gel 15 Gram(s) Oral once PRN Blood Glucose LESS THAN 70 milliGRAM(s)/deciliter  glucagon  Injectable 1 milliGRAM(s) IntraMuscular once PRN Glucose LESS THAN 70 milligrams/deciliter      ANTIMICROBIALS:  azithromycin  IVPB 500 every 24 hours  cefTRIAXone   IVPB 1000 every 24 hours  hydroxychloroquine    hydroxychloroquine 200 every 12 hours      OTHER MEDS: MEDICATIONS  (STANDING):  ALBUTerol    90 MICROgram(s) HFA Inhaler 1 every 4 hours PRN  amLODIPine   Tablet 10 daily  aspirin  chewable 81 daily  atorvastatin 40 at bedtime  benzonatate 100 three times a day PRN  dextrose 40% Gel 15 once PRN  dextrose 50% Injectable 12.5 once  dextrose 50% Injectable 25 once  dextrose 50% Injectable 25 once  glucagon  Injectable 1 once PRN  insulin lispro (HumaLOG) corrective regimen sliding scale  three times a day before meals  losartan 100 daily  methylPREDNISolone sodium succinate Injectable 90 two times a day  rivaroxaban 20 with dinner             REVIEW OF SYSTEMS:  CONSTITUTIONAL:  No Fever or chills  HEENT:  No diplopia or blurred vision.  No earache, sore throat or runny nose.  CARDIOVASCULAR:  No pressure, squeezing, strangling, tightness, heaviness or aching about the chest, neck, axilla or epigastrium.  RESPIRATORY:  No cough, shortness of breath  GASTROINTESTINAL:  No nausea, vomiting or diarrhea.  GENITOURINARY:  No dysuria, frequency or urgency. No Blood in urine  MUSCULOSKELETAL:  no joint aches, no muscle pain  SKIN:  No change in skin, hair or nails.  NEUROLOGIC:  No Headaches, seizures or weakness.  PSYCHIATRIC:  No disorder of thought or mood.  ENDOCRINE:  No heat or cold intolerance  HEMATOLOGICAL:  No easy bruising or bleeding.           I&O's Detail        Physical Exam:  Vital Signs Last 24 Hrs  T(C): 36.5 (07 Apr 2020 09:41), Max: 36.9 (07 Apr 2020 00:00)  T(F): 97.7 (07 Apr 2020 09:41), Max: 98.4 (07 Apr 2020 00:00)  HR: 97 (07 Apr 2020 09:41) (80 - 97)  BP: 125/60 (07 Apr 2020 05:30) (125/60 - 135/67)  BP(mean): --  RR: 20 (07 Apr 2020 05:30) (20 - 22)  SpO2: 97% (07 Apr 2020 09:41) (93% - 97%)    GEN: NAD, pleasant  HEENT: normocephalic and atraumatic. EOMI. PERRL.  Anicteric  NECK: Supple.   LUNGS: Clear to auscultation.  HEART: Regular rate and rhythm without murmur.  ABDOMEN: Soft, nontender, and nondistended.  Positive bowel sounds.    : No CVA tenderness  EXTREMITIES: Without any edema.  MSK: No joint swelling  NEUROLOGIC: Cranial nerves II through XII are grossly intact. No Focal Deficits  PSYCHIATRIC: Appropriate affect .  SKIN: No Rash        Labs:      Radiology: [facility]  INFECTIOUS DISEASES AND INTERNAL MEDICINE at [place]  =======================================================  Jerry Sommer MD  Diplomates American Board of Internal Medicine and Infectious Diseases  Tel: [phone]      Fax: [phone]  =======================================================      [id number]  BOBY EDWARDS is a 74y  Male     CC: Patient is a 74y old  Male who presents with a chief complaint of Dyspnea (06 Apr 2020 16:56)    HPI:  73 y/o M w/ PMHx Afib (Xarelto), T2DM, HTN, HLD, Anemia, TIA, CVA who presented to ED due to SOB for 2 weeks and fever of 102F. He has also been having diarrhea for 2 weeks on/off. He was admitted from 03/28 to 03/30 for pneumonia and was tested positive for COVID during that admission. He did not have any hypoxia during that time and did not require supplemental oxygen. He was treated with Azithro and Rocephin during that hospitalization.  Patient denies HA, dizziness, LOC, CP, cough, Abd pain, N/V, dysuria. (06 Apr 2020 16:56)      PAST MEDICAL & SURGICAL HISTORY:  [place] (hard of hearing)  BPH (benign prostatic hyperplasia)  Diabetes  Atrial fibrillation: paroxysmal on xarelto  CVA (cerebral vascular accident)  Hyperlipemia  History of hypertension  History of loop recorder  S/P appendectomy      Social Hx: non smoker    FAMILY HISTORY:  No pertinent family history in first degree relatives      Allergies    No Known Allergies    Intolerances        MEDICATIONS  (STANDING):  amLODIPine   Tablet 10 milliGRAM(s) Oral daily  ascorbic acid 500 milliGRAM(s) Oral three times a day  aspirin  chewable 81 milliGRAM(s) Oral daily  atorvastatin 40 milliGRAM(s) Oral at bedtime  azithromycin  IVPB 500 milliGRAM(s) IV Intermittent every 24 hours  cefTRIAXone   IVPB 1000 milliGRAM(s) IV Intermittent every 24 hours  dextrose 5%. 1000 milliLiter(s) (50 mL/Hr) IV Continuous <Continuous>  dextrose 50% Injectable 12.5 Gram(s) IV Push once  dextrose 50% Injectable 25 Gram(s) IV Push once  dextrose 50% Injectable 25 Gram(s) IV Push once  hydroxychloroquine   Oral   hydroxychloroquine 200 milliGRAM(s) Oral every 12 hours  insulin lispro (HumaLOG) corrective regimen sliding scale   SubCutaneous three times a day before meals  losartan 100 milliGRAM(s) Oral daily  methylPREDNISolone sodium succinate Injectable 90 milliGRAM(s) IV Push two times a day  rivaroxaban 20 milliGRAM(s) Oral with dinner  thiamine 100 milliGRAM(s) Oral daily    MEDICATIONS  (PRN):  ALBUTerol    90 MICROgram(s) HFA Inhaler 1 Puff(s) Inhalation every 4 hours PRN Shortness of Breath and/or Wheezing  benzonatate 100 milliGRAM(s) Oral three times a day PRN Cough  dextrose 40% Gel 15 Gram(s) Oral once PRN Blood Glucose LESS THAN 70 milliGRAM(s)/deciliter  glucagon  Injectable 1 milliGRAM(s) IntraMuscular once PRN Glucose LESS THAN 70 milligrams/deciliter      ANTIMICROBIALS:  azithromycin  IVPB 500 every 24 hours  cefTRIAXone   IVPB 1000 every 24 hours  hydroxychloroquine    hydroxychloroquine 200 every 12 hours      OTHER MEDS: MEDICATIONS  (STANDING):  ALBUTerol    90 MICROgram(s) HFA Inhaler 1 every 4 hours PRN  amLODIPine   Tablet 10 daily  aspirin  chewable 81 daily  atorvastatin 40 at bedtime  benzonatate 100 three times a day PRN  dextrose 40% Gel 15 once PRN  dextrose 50% Injectable 12.5 once  dextrose 50% Injectable 25 once  dextrose 50% Injectable 25 once  glucagon  Injectable 1 once PRN  insulin lispro (HumaLOG) corrective regimen sliding scale  three times a day before meals  losartan 100 daily  methylPREDNISolone sodium succinate Injectable 90 two times a day  rivaroxaban 20 with dinner             REVIEW OF SYSTEMS:  CONSTITUTIONAL:  No Fever or chills  HEENT:  No diplopia or blurred vision.  No earache, sore throat or runny nose.  CARDIOVASCULAR:  No pressure, squeezing, strangling, tightness, heaviness or aching about the chest, neck, axilla or epigastrium.  RESPIRATORY:  No cough, shortness of breath  GASTROINTESTINAL:  No nausea, vomiting or diarrhea.  GENITOURINARY:  No dysuria, frequency or urgency. No Blood in urine  MUSCULOSKELETAL:  no joint aches, no muscle pain  SKIN:  No change in skin, hair or nails.  NEUROLOGIC:  No Headaches, seizures or weakness.  PSYCHIATRIC:  No disorder of thought or mood.  ENDOCRINE:  No heat or cold intolerance  HEMATOLOGICAL:  No easy bruising or bleeding.           I&O's Detail        Physical Exam:  Vital Signs Last 24 Hrs  T(C): 36.5 (07 Apr 2020 09:41), Max: 36.9 (07 Apr 2020 00:00)  T(F): 97.7 (07 Apr 2020 09:41), Max: 98.4 (07 Apr 2020 00:00)  HR: 97 (07 Apr 2020 09:41) (80 - 97)  BP: 125/60 (07 Apr 2020 05:30) (125/60 - 135/67)  BP(mean): --    C-Reactive Protein, Serum: 15.84 (04-06)    Ferritin, Serum: 2168 (04-06)      D-Dimer Assay, Quantitative: 57166 (04-06)    RR: 20 (07 Apr 2020 05:30) (20 - 22)  SpO2: 97% (07 Apr 2020 09:41) (93% - 97%)    GEN: NAD, on NRB  HEENT: normocephalic and atraumatic. EOMI. PERRL.  Anicteric  NECK: Supple.   LUNGS: Clear to auscultation.  HEART: Regular rate and rhythm without murmur.  ABDOMEN: Soft, nontender, and nondistended.  Positive bowel sounds.    : No CVA tenderness  EXTREMITIES: Without any edema.  MSK: No joint swelling  NEUROLOGIC: Cranial nerves II through XII are grossly intact. No Focal Deficits  PSYCHIATRIC: Appropriate affect .  SKIN: No Rash        Labs:                        11.7   18.88 )-----------( 222      ( 07 Apr 2020 20:08 )             35.9   04-06    137  |  100  |  28.0<H>  ----------------------------<  304<H>  3.8   |  19.0<L>  |  1.36<H>    Ca    9.6      06 Apr 2020 12:28    TPro  7.0  /  Alb  3.1<L>  /  TBili  0.5  /  DBili  x   /  AST  27  /  ALT  21  /  AlkPhos  76  04-06      Radiology:  < from: Xray Chest 1 View AP/PA. (04.06.20 @ 13:36) >  FINDINGS:    Single frontal view of the chest demonstrates diffuse bilateral lower lobe infiltrates. The cardiomediastinal silhouette is enlarged. No acute osseous abnormalities.    IMPRESSION: Diffuse bilateral lower lobe infiltrates.      < end of copied text >

## 2020-04-07 NOTE — PROGRESS NOTE ADULT - SUBJECTIVE AND OBJECTIVE BOX
HOSPITALIST PROGRESS NOTE    PETER SCOURTOS  [id number]  74yMale    Patient is a 74y old  Male who presents with a chief complaint of Dyspnea (07 Apr 2020 15:32)      SUBJECTIVE:   Chart reviewed since last visit.  Patient seen and examined at bedside for acute hypoxic respiratory failure, COVID pneumonia  Feels 'OK' - on NRB, denies any dyspnea, chest pain, fever or chills        OBJECTIVE:  Vital Signs Last 24 Hrs  T(C): 36.6 (07 Apr 2020 15:34), Max: 36.9 (07 Apr 2020 00:00)  T(F): 97.8 (07 Apr 2020 15:34), Max: 98.4 (07 Apr 2020 00:00)  HR: 93 (07 Apr 2020 15:34) (80 - 97)  BP: 137/79 (07 Apr 2020 15:34) (125/60 - 137/79)   RR: 20 (07 Apr 2020 05:30) (20 - 22)  SpO2: 93% (07 Apr 2020 15:34) (93% - 97%)    PHYSICAL EXAMINATION  General: Obese, lying in stretcher, comfortable  HEENT:  NRB+  NECK:  supple  CVS: regular rate and rhythm   RESP:  CTAB  GI:  Soft nondistended nontender BS+  : No suprapubic tenderness  MSK:  FROM, no edema  CNS:  No gross focal or global deficit noted  INTEG:  warn dry skin  PSYCH:  Fair mood    MONITOR:  CAPILLARY BLOOD GLUCOSE      POCT Blood Glucose.: 353 mg/dL (07 Apr 2020 11:18)        I&O's Summary                          11.9   11.25 )-----------( 189      ( 06 Apr 2020 12:28 )             35.8       04-06    137  |  100  |  28.0<H>  ----------------------------<  304<H>  3.8   |  19.0<L>  |  1.36<H>    Ca    9.6      06 Apr 2020 12:28    TPro  7.0  /  Alb  3.1<L>  /  TBili  0.5  /  DBili  x   /  AST  27  /  ALT  21  /  AlkPhos  76  04-06            Culture:    TTE:    RADIOLOGY        MEDICATIONS  (STANDING):  amLODIPine   Tablet 10 milliGRAM(s) Oral daily  ascorbic acid 500 milliGRAM(s) Oral three times a day  aspirin  chewable 81 milliGRAM(s) Oral daily  atorvastatin 40 milliGRAM(s) Oral at bedtime  dextrose 5%. 1000 milliLiter(s) (50 mL/Hr) IV Continuous <Continuous>  dextrose 50% Injectable 12.5 Gram(s) IV Push once  dextrose 50% Injectable 25 Gram(s) IV Push once  dextrose 50% Injectable 25 Gram(s) IV Push once  hydroxychloroquine   Oral   hydroxychloroquine 200 milliGRAM(s) Oral every 12 hours  insulin lispro (HumaLOG) corrective regimen sliding scale   SubCutaneous three times a day before meals  losartan 100 milliGRAM(s) Oral daily  methylPREDNISolone sodium succinate Injectable 90 milliGRAM(s) IV Push two times a day  piperacillin/tazobactam IVPB.. 3.375 Gram(s) IV Intermittent every 8 hours  rivaroxaban 20 milliGRAM(s) Oral with dinner  thiamine 100 milliGRAM(s) Oral daily      MEDICATIONS  (PRN):  ALBUTerol    90 MICROgram(s) HFA Inhaler 1 Puff(s) Inhalation every 4 hours PRN Shortness of Breath and/or Wheezing  benzonatate 100 milliGRAM(s) Oral three times a day PRN Cough  dextrose 40% Gel 15 Gram(s) Oral once PRN Blood Glucose LESS THAN 70 milliGRAM(s)/deciliter  glucagon  Injectable 1 milliGRAM(s) IntraMuscular once PRN Glucose LESS THAN 70 milligrams/deciliter

## 2020-04-08 LAB
ACETONE SERPL-MCNC: NEGATIVE — SIGNIFICANT CHANGE UP
ALBUMIN SERPL ELPH-MCNC: 2.9 G/DL — LOW (ref 3.3–5.2)
ALP SERPL-CCNC: 83 U/L — SIGNIFICANT CHANGE UP (ref 40–120)
ALT FLD-CCNC: 38 U/L — SIGNIFICANT CHANGE UP
ANION GAP SERPL CALC-SCNC: 17 MMOL/L — SIGNIFICANT CHANGE UP (ref 5–17)
ANION GAP SERPL CALC-SCNC: 20 MMOL/L — HIGH (ref 5–17)
AST SERPL-CCNC: 42 U/L — HIGH
BASOPHILS # BLD AUTO: 0.02 K/UL — SIGNIFICANT CHANGE UP (ref 0–0.2)
BASOPHILS NFR BLD AUTO: 0.1 % — SIGNIFICANT CHANGE UP (ref 0–2)
BILIRUB SERPL-MCNC: 0.2 MG/DL — LOW (ref 0.4–2)
BUN SERPL-MCNC: 41 MG/DL — HIGH (ref 8–20)
BUN SERPL-MCNC: 42 MG/DL — HIGH (ref 8–20)
CALCIUM SERPL-MCNC: 8.9 MG/DL — SIGNIFICANT CHANGE UP (ref 8.6–10.2)
CALCIUM SERPL-MCNC: 9.3 MG/DL — SIGNIFICANT CHANGE UP (ref 8.6–10.2)
CHLORIDE SERPL-SCNC: 104 MMOL/L — SIGNIFICANT CHANGE UP (ref 98–107)
CHLORIDE SERPL-SCNC: 98 MMOL/L — SIGNIFICANT CHANGE UP (ref 98–107)
CO2 SERPL-SCNC: 17 MMOL/L — LOW (ref 22–29)
CO2 SERPL-SCNC: 19 MMOL/L — LOW (ref 22–29)
CREAT SERPL-MCNC: 1.39 MG/DL — HIGH (ref 0.5–1.3)
CREAT SERPL-MCNC: 1.54 MG/DL — HIGH (ref 0.5–1.3)
CRP SERPL-MCNC: 6.89 MG/DL — HIGH (ref 0–0.4)
D DIMER BLD IA.RAPID-MCNC: HIGH NG/ML DDU
EOSINOPHIL # BLD AUTO: 0 K/UL — SIGNIFICANT CHANGE UP (ref 0–0.5)
EOSINOPHIL NFR BLD AUTO: 0 % — SIGNIFICANT CHANGE UP (ref 0–6)
FERRITIN SERPL-MCNC: 2121 NG/ML — HIGH (ref 30–400)
GLUCOSE BLDC GLUCOMTR-MCNC: 340 MG/DL — HIGH (ref 70–99)
GLUCOSE BLDC GLUCOMTR-MCNC: 343 MG/DL — HIGH (ref 70–99)
GLUCOSE BLDC GLUCOMTR-MCNC: 402 MG/DL — HIGH (ref 70–99)
GLUCOSE BLDC GLUCOMTR-MCNC: 503 MG/DL — CRITICAL HIGH (ref 70–99)
GLUCOSE BLDC GLUCOMTR-MCNC: 510 MG/DL — CRITICAL HIGH (ref 70–99)
GLUCOSE BLDC GLUCOMTR-MCNC: 519 MG/DL — CRITICAL HIGH (ref 70–99)
GLUCOSE SERPL-MCNC: 328 MG/DL — HIGH (ref 70–99)
GLUCOSE SERPL-MCNC: 513 MG/DL — CRITICAL HIGH (ref 70–99)
HCT VFR BLD CALC: 35 % — LOW (ref 39–50)
HGB BLD-MCNC: 11.7 G/DL — LOW (ref 13–17)
IMM GRANULOCYTES NFR BLD AUTO: 1.4 % — SIGNIFICANT CHANGE UP (ref 0–1.5)
LYMPHOCYTES # BLD AUTO: 0.96 K/UL — LOW (ref 1–3.3)
LYMPHOCYTES # BLD AUTO: 4.7 % — LOW (ref 13–44)
MAGNESIUM SERPL-MCNC: 1.5 MG/DL — LOW (ref 1.6–2.6)
MAGNESIUM SERPL-MCNC: 1.8 MG/DL — SIGNIFICANT CHANGE UP (ref 1.6–2.6)
MCHC RBC-ENTMCNC: 28.9 PG — SIGNIFICANT CHANGE UP (ref 27–34)
MCHC RBC-ENTMCNC: 33.4 GM/DL — SIGNIFICANT CHANGE UP (ref 32–36)
MCV RBC AUTO: 86.4 FL — SIGNIFICANT CHANGE UP (ref 80–100)
MONOCYTES # BLD AUTO: 0.27 K/UL — SIGNIFICANT CHANGE UP (ref 0–0.9)
MONOCYTES NFR BLD AUTO: 1.3 % — LOW (ref 2–14)
NEUTROPHILS # BLD AUTO: 18.75 K/UL — HIGH (ref 1.8–7.4)
NEUTROPHILS NFR BLD AUTO: 92.5 % — HIGH (ref 43–77)
PHOSPHATE SERPL-MCNC: 2.2 MG/DL — LOW (ref 2.4–4.7)
PLATELET # BLD AUTO: 220 K/UL — SIGNIFICANT CHANGE UP (ref 150–400)
POTASSIUM SERPL-MCNC: 3.9 MMOL/L — SIGNIFICANT CHANGE UP (ref 3.5–5.3)
POTASSIUM SERPL-MCNC: 4.2 MMOL/L — SIGNIFICANT CHANGE UP (ref 3.5–5.3)
POTASSIUM SERPL-SCNC: 3.9 MMOL/L — SIGNIFICANT CHANGE UP (ref 3.5–5.3)
POTASSIUM SERPL-SCNC: 4.2 MMOL/L — SIGNIFICANT CHANGE UP (ref 3.5–5.3)
PROT SERPL-MCNC: 6.4 G/DL — LOW (ref 6.6–8.7)
RBC # BLD: 4.05 M/UL — LOW (ref 4.2–5.8)
RBC # FLD: 14.2 % — SIGNIFICANT CHANGE UP (ref 10.3–14.5)
SODIUM SERPL-SCNC: 135 MMOL/L — SIGNIFICANT CHANGE UP (ref 135–145)
SODIUM SERPL-SCNC: 140 MMOL/L — SIGNIFICANT CHANGE UP (ref 135–145)
WBC # BLD: 20.29 K/UL — HIGH (ref 3.8–10.5)
WBC # FLD AUTO: 20.29 K/UL — HIGH (ref 3.8–10.5)

## 2020-04-08 PROCEDURE — 71275 CT ANGIOGRAPHY CHEST: CPT | Mod: 26

## 2020-04-08 PROCEDURE — 99233 SBSQ HOSP IP/OBS HIGH 50: CPT

## 2020-04-08 PROCEDURE — 99232 SBSQ HOSP IP/OBS MODERATE 35: CPT

## 2020-04-08 RX ORDER — INSULIN GLARGINE 100 [IU]/ML
15 INJECTION, SOLUTION SUBCUTANEOUS AT BEDTIME
Refills: 0 | Status: DISCONTINUED | OUTPATIENT
Start: 2020-04-08 | End: 2020-04-09

## 2020-04-08 RX ORDER — DEXTROSE 50 % IN WATER 50 %
15 SYRINGE (ML) INTRAVENOUS ONCE
Refills: 0 | Status: DISCONTINUED | OUTPATIENT
Start: 2020-04-08 | End: 2020-04-17

## 2020-04-08 RX ORDER — INSULIN LISPRO 100/ML
18 VIAL (ML) SUBCUTANEOUS ONCE
Refills: 0 | Status: COMPLETED | OUTPATIENT
Start: 2020-04-08 | End: 2020-04-08

## 2020-04-08 RX ORDER — POTASSIUM CHLORIDE 20 MEQ
20 PACKET (EA) ORAL ONCE
Refills: 0 | Status: COMPLETED | OUTPATIENT
Start: 2020-04-08 | End: 2020-04-08

## 2020-04-08 RX ORDER — SODIUM CHLORIDE 9 MG/ML
1000 INJECTION INTRAMUSCULAR; INTRAVENOUS; SUBCUTANEOUS
Refills: 0 | Status: COMPLETED | OUTPATIENT
Start: 2020-04-08 | End: 2020-04-08

## 2020-04-08 RX ORDER — MAGNESIUM SULFATE 500 MG/ML
2 VIAL (ML) INJECTION ONCE
Refills: 0 | Status: COMPLETED | OUTPATIENT
Start: 2020-04-08 | End: 2020-04-08

## 2020-04-08 RX ORDER — DEXTROSE 50 % IN WATER 50 %
12.5 SYRINGE (ML) INTRAVENOUS ONCE
Refills: 0 | Status: DISCONTINUED | OUTPATIENT
Start: 2020-04-08 | End: 2020-04-17

## 2020-04-08 RX ORDER — DEXTROSE 50 % IN WATER 50 %
25 SYRINGE (ML) INTRAVENOUS ONCE
Refills: 0 | Status: DISCONTINUED | OUTPATIENT
Start: 2020-04-08 | End: 2020-04-17

## 2020-04-08 RX ORDER — GLUCAGON INJECTION, SOLUTION 0.5 MG/.1ML
1 INJECTION, SOLUTION SUBCUTANEOUS ONCE
Refills: 0 | Status: DISCONTINUED | OUTPATIENT
Start: 2020-04-08 | End: 2020-04-17

## 2020-04-08 RX ORDER — PIPERACILLIN AND TAZOBACTAM 4; .5 G/20ML; G/20ML
3.38 INJECTION, POWDER, LYOPHILIZED, FOR SOLUTION INTRAVENOUS EVERY 8 HOURS
Refills: 0 | Status: DISCONTINUED | OUTPATIENT
Start: 2020-04-08 | End: 2020-04-09

## 2020-04-08 RX ORDER — SODIUM CHLORIDE 9 MG/ML
1000 INJECTION, SOLUTION INTRAVENOUS
Refills: 0 | Status: DISCONTINUED | OUTPATIENT
Start: 2020-04-08 | End: 2020-04-17

## 2020-04-08 RX ORDER — SODIUM CHLORIDE 9 MG/ML
1000 INJECTION INTRAMUSCULAR; INTRAVENOUS; SUBCUTANEOUS
Refills: 0 | Status: DISCONTINUED | OUTPATIENT
Start: 2020-04-08 | End: 2020-04-09

## 2020-04-08 RX ORDER — SODIUM CHLORIDE 9 MG/ML
1000 INJECTION INTRAMUSCULAR; INTRAVENOUS; SUBCUTANEOUS
Refills: 0 | Status: DISCONTINUED | OUTPATIENT
Start: 2020-04-08 | End: 2020-04-08

## 2020-04-08 RX ORDER — MAGNESIUM SULFATE 500 MG/ML
1 VIAL (ML) INJECTION ONCE
Refills: 0 | Status: COMPLETED | OUTPATIENT
Start: 2020-04-08 | End: 2020-04-08

## 2020-04-08 RX ORDER — INSULIN LISPRO 100/ML
5 VIAL (ML) SUBCUTANEOUS
Refills: 0 | Status: DISCONTINUED | OUTPATIENT
Start: 2020-04-08 | End: 2020-04-09

## 2020-04-08 RX ORDER — INSULIN LISPRO 100/ML
VIAL (ML) SUBCUTANEOUS EVERY 4 HOURS
Refills: 0 | Status: DISCONTINUED | OUTPATIENT
Start: 2020-04-08 | End: 2020-04-09

## 2020-04-08 RX ADMIN — ATORVASTATIN CALCIUM 40 MILLIGRAM(S): 80 TABLET, FILM COATED ORAL at 23:00

## 2020-04-08 RX ADMIN — LOSARTAN POTASSIUM 100 MILLIGRAM(S): 100 TABLET, FILM COATED ORAL at 07:31

## 2020-04-08 RX ADMIN — PIPERACILLIN AND TAZOBACTAM 25 GRAM(S): 4; .5 INJECTION, POWDER, LYOPHILIZED, FOR SOLUTION INTRAVENOUS at 23:30

## 2020-04-08 RX ADMIN — Medication 20 MILLIEQUIVALENT(S): at 23:00

## 2020-04-08 RX ADMIN — Medication 18: at 13:24

## 2020-04-08 RX ADMIN — Medication 100 GRAM(S): at 23:00

## 2020-04-08 RX ADMIN — Medication 81 MILLIGRAM(S): at 11:39

## 2020-04-08 RX ADMIN — SODIUM CHLORIDE 150 MILLILITER(S): 9 INJECTION INTRAMUSCULAR; INTRAVENOUS; SUBCUTANEOUS at 11:19

## 2020-04-08 RX ADMIN — Medication 18: at 09:55

## 2020-04-08 RX ADMIN — Medication 100 MILLIGRAM(S): at 11:40

## 2020-04-08 RX ADMIN — Medication 5 UNIT(S): at 09:55

## 2020-04-08 RX ADMIN — PIPERACILLIN AND TAZOBACTAM 25 GRAM(S): 4; .5 INJECTION, POWDER, LYOPHILIZED, FOR SOLUTION INTRAVENOUS at 07:31

## 2020-04-08 RX ADMIN — Medication 200 MILLIGRAM(S): at 16:23

## 2020-04-08 RX ADMIN — SODIUM CHLORIDE 250 MILLILITER(S): 9 INJECTION INTRAMUSCULAR; INTRAVENOUS; SUBCUTANEOUS at 11:20

## 2020-04-08 RX ADMIN — PIPERACILLIN AND TAZOBACTAM 25 GRAM(S): 4; .5 INJECTION, POWDER, LYOPHILIZED, FOR SOLUTION INTRAVENOUS at 13:28

## 2020-04-08 RX ADMIN — Medication 12: at 17:07

## 2020-04-08 RX ADMIN — Medication 5 UNIT(S): at 17:07

## 2020-04-08 RX ADMIN — Medication 500 MILLIGRAM(S): at 23:00

## 2020-04-08 RX ADMIN — Medication 500 MILLIGRAM(S): at 13:24

## 2020-04-08 RX ADMIN — Medication 500 MILLIGRAM(S): at 07:31

## 2020-04-08 RX ADMIN — INSULIN GLARGINE 15 UNIT(S): 100 INJECTION, SOLUTION SUBCUTANEOUS at 21:07

## 2020-04-08 RX ADMIN — RIVAROXABAN 20 MILLIGRAM(S): KIT at 16:23

## 2020-04-08 RX ADMIN — Medication 200 MILLIGRAM(S): at 07:31

## 2020-04-08 RX ADMIN — Medication 90 MILLIGRAM(S): at 07:31

## 2020-04-08 RX ADMIN — Medication 18 UNIT(S): at 08:15

## 2020-04-08 RX ADMIN — Medication 12: at 21:07

## 2020-04-08 RX ADMIN — Medication 50 GRAM(S): at 09:52

## 2020-04-08 RX ADMIN — Medication 90 MILLIGRAM(S): at 16:26

## 2020-04-08 RX ADMIN — AMLODIPINE BESYLATE 10 MILLIGRAM(S): 2.5 TABLET ORAL at 07:31

## 2020-04-08 NOTE — PROGRESS NOTE ADULT - ATTENDING COMMENTS
Interval worsening respiratory failure as now requiring 50%VM  Interval development of progressive DKA - treated with IVF, Insulins.    Discussed with DENISE Holden and MARKELL Jessica    Family update (both wife and son) Interval worsening respiratory failure as now requiring 50%VM despite being on Plaquenil and Solumedrol as well as Zosyn. Will check CTA to evaluate for VTE (patient on Xarelto)    Interval development of progressive DKA - treated with IVF, Insulins. Monitor and replace lytes    Discussed with DENISE Holden and MARKELL Jessica    Family updated (both wife and son) - explained that patient requiring more Oxygen, getting treatments as outlined. All questions answered

## 2020-04-08 NOTE — PROGRESS NOTE ADULT - ASSESSMENT
· Assessment		  75 y/o M w/ PMHx Afib (Xarelto), T2DM, HTN, HLD, Anemia, TIA, CVA who presented to ED due to acute hypoxic respiratory distress likely secondary to COVID pneumonia versus superimposed bacterial pneumonia     Hypoxic Respiratory Failure, Secondary to Pneumonia, viral  was worsening requiring NRB, now on 50% venti mask maintaining sats >92%  COVID19 positive  Inflammatory markers elevated  CXR: b/l pneumonia  c/w Plaquenil protocol  c/w Solumedrol 1mg/kg BID  c/w Vitamin C 500mg TID  c/w Thiamine 200mg QD  c/w albuterol prn  c/w tessalon perles prn for cough  Started on IV antibiotics by ID    Atrial fibrillation   converted to NSR   rate controlled  Recently stopped Coreg, stated it was too strong for him  CHADS2 score: 5  HAS-BLED: 4  c/w ASA  c/w rivaroxaban for anticoagulation    HOSSEIN  2/2 to steroid induced DKA/dehydration  creatinine uptrending  urine output not adequate 200mL in last 12 hours   IVF 250mL/hr x 4 hours, then 150mL/hr continuous normal saline     Anion Gap Diabetic Ketoacidosis   Anion gap 20  HCO3 17  POCT BG >500  HbA1c: 11.9% (4/6/20)  Insulin sliding scale increased  premeal added  lantus added  IVF 250mL/hr x 4 hours, then 150mL/hr continuous normal saline   monitor electrolytes replete as necessary  if no improvement patient will likely require IV insulin infusion.    Hypertension  Close blood pressure monitoring  c/w Losartan 100 QD    Hx of CVA  no new neurological symptoms  c/w statin    Prophylactic Measure  Adequately anticoagulated with Xarelto    Prognosis - guarded  Code Status: full code  Disposition - Pending clinical course    Emergency contact: Minal (wife) [phone] · Assessment		  75 y/o M w/ PMHx Afib (Xarelto), T2DM, HTN, HLD, Anemia, TIA, CVA who presented to ED due to acute hypoxic respiratory distress likely secondary to COVID pneumonia versus superimposed bacterial pneumonia     Hypoxic Respiratory Failure, Secondary to Pneumonia, viral  was worsening requiring NRB, now on 50% venti mask maintaining sats >92%  COVID19 positive  Inflammatory markers elevated  CXR: b/l pneumonia  c/w Plaquenil protocol  c/w Solumedrol 1mg/kg BID  c/w Vitamin C 500mg TID  c/w Thiamine 200mg QD  c/w albuterol prn  c/w tessalon perles prn for cough  Started on IV antibiotics by ID  Low threshold for ventilation if progressive worsening    Atrial fibrillation   converted to NSR   rate controlled  Recently stopped Coreg, stated it was too strong for him  CHADS2 score: 5  HAS-BLED: 4  c/w ASA  c/w rivaroxaban for anticoagulation    HOSSEIN  2/2 to steroid induced DKA/dehydration  creatinine up trending  urine output not adequate 200mL in last 12 hours   IVF 250mL/hr x 4 hours, then 150mL/hr continuous normal saline     Anion Gap Diabetic Ketoacidosis   Anion gap 20  HCO3 17  POCT BG >500  HbA1c: 11.9% (4/6/20)  Insulin sliding scale increased  premeal added  lantus added  IVF 250mL/hr x 4 hours, then 150mL/hr continuous normal saline   monitor electrolytes replete as necessary  if no improvement patient will likely require IV insulin infusion.  Monitor Lytes    Hypertension  Close blood pressure monitoring  c/w Losartan 100 QD    Hx of CVA  no new neurological symptoms  c/w statin    Prophylactic Measure  Adequately anticoagulated with Xarelto    Prognosis - guarded  Code Status: full code  Disposition - Pending clinical course    Emergency contact: Jessica (wife) [phone]

## 2020-04-08 NOTE — PROGRESS NOTE ADULT - SUBJECTIVE AND OBJECTIVE BOX
HPI:  73 y/o M w/ PMHx Afib (Xarelto), T2DM, HTN, HLD, Anemia, TIA, CVA who presented to ED due to SOB for 2 weeks and fever of 102F. He has also been having diarrhea for 2 weeks on/off. He was admitted from 03/28 to 03/30 for pneumonia and was tested positive for COVID during that admission. He did not have any hypoxia during that time and did not require supplemental oxygen. He was treated with Azithro and Rocephin during that hospitalization.  Patient denies HA, dizziness, LOC, CP, cough, Abd pain, N/V, dysuria. (06 Apr 2020 16:56)    ROS: All review of systems negative unless indicated otherwise below.                          LAB RESULTS                   COMPLETE BLOOD COUNT( 08 Apr 2020 06:06 )                            11.7 g/dL<L>  20.29 K/uL<H> )---------------( 220 K/uL                        35.0 %<L>      Automated Differential     Auto Basophil # - 0.02 K/uL  Auto Basophil % - 0.1 %  Auto Eosinophil # - 0.00 K/uL  Auto Eosinophil % - 0.0 %  Auto Immature Granulocyte # - X      Auto Immature Granulocyte % - 1.4 %  Auto Lymphocyte # - 0.96 K/uL<L>  Auto Lymphocyte % - 4.7 %<L>  Auto Monocyte # - 0.27 K/uL  Auto Monocyte % - 1.3 %<L>  Auto Neutrophil # - 18.75 K/uL<H>  Auto Neutrophil % - 92.5 %<H>                                  CHEMISTRY                 Basic Metabolic Panel (04-08-20 @ 06:06)    135  |  98  |  42.0<H>  ----------------------------<  513<HH>  4.2   |  17.0<L>  |  1.54<H>    Ca    9.3      08 Apr 2020 06:06  Mg     1.5     04-08                    Liver Functions (04-08-20 @ 06:06))  TPro  6.4  /  Alb  2.9  /  TBili  0.2  /  DBili  x   /  AST  42  /  ALT  38  /  AlkPhos  83     PT/INR/PTT ( 28 Mar 2020 06:41 )                        :                       :      24.2         :       47.2                  .        .                   .              .           .       2.09        .                                                             MICROBIOLOGY/CULTURES:    Culture - Blood (collected 03-28-20 @ 03:54)  Source: .Blood  Final Report (04-02-20 @ 05:00):    No growth at 5 days.    Culture - Blood (collected 03-28-20 @ 03:53)  Source: .Blood  Final Report (04-02-20 @ 05:00):    No growth at 5 days.  Rapid RVP Result: NotDetec (03-28-20 @ 02:58)                          RADIOLOGY RESULTS: Personally visualized   < from: Xray Chest 1 View AP/PA. (04.06.20 @ 13:36) >  IMPRESSION: Diffuse bilateral lower lobe infiltrates.    < end of copied text >                          CARDIOLOGY REVIEW: Personally visualized and reviewed  Telemetry Last 24h: SR/ST , 7 WCT, PVCs, Oxygen sat desat to 85, usual range 88-92% on 50% venti                               INTAKE AND OUTPUT - 48 HOUR TREND     04-07-20 @ 07:01  -  04-08-20 @ 07:00  --------------------------------------------------------  IN:  Total IN: 0 mL    OUT:    Voided: 200 mL  Total OUT: 200 mL    Total NET: -200 mL      HOME MEDICATIONS:  amLODIPine 10 mg oral tablet: 1 tab(s) orally once a day (06 Apr 2020 16:23)  aspirin 81 mg oral tablet, chewable: 1 tab(s) orally once a day (06 Apr 2020 16:23)  atorvastatin 40 mg oral tablet: 1 tab(s) orally once a day (06 Apr 2020 16:23)  losartan 100 mg oral tablet: 1 tab(s) orally once a day (06 Apr 2020 16:23)  metFORMIN 500 mg oral tablet: 1 tab(s) orally 2 times a day (06 Apr 2020 16:23)  pioglitazone 45 mg oral tablet: orally once a day (06 Apr 2020 16:23)  Xarelto 20 mg oral tablet: 1 tab(s) orally once a day (in the evening) (06 Apr 2020 16:23)                             Current Admission Active Medications    ALBUTerol    90 MICROgram(s) HFA Inhaler 1 Puff(s) Inhalation every 4 hours PRN Shortness of Breath and/or Wheezing  amLODIPine   Tablet 10 milliGRAM(s) Oral daily  ascorbic acid 500 milliGRAM(s) Oral three times a day  aspirin  chewable 81 milliGRAM(s) Oral daily  atorvastatin 40 milliGRAM(s) Oral at bedtime  benzonatate 100 milliGRAM(s) Oral three times a day PRN Cough  dextrose 40% Gel 15 Gram(s) Oral once PRN Blood Glucose LESS THAN 70 milliGRAM(s)/deciliter  dextrose 5%. 1000 milliLiter(s) (50 mL/Hr) IV Continuous <Continuous>  dextrose 50% Injectable 12.5 Gram(s) IV Push once  dextrose 50% Injectable 25 Gram(s) IV Push once  dextrose 50% Injectable 25 Gram(s) IV Push once  glucagon  Injectable 1 milliGRAM(s) IntraMuscular once PRN Glucose LESS THAN 70 milligrams/deciliter  glucagon  Injectable 1 milliGRAM(s) IntraMuscular once PRN Glucose LESS THAN 70 milligrams/deciliter  hydroxychloroquine   Oral   hydroxychloroquine 200 milliGRAM(s) Oral every 12 hours  insulin glargine Injectable (LANTUS) 15 Unit(s) SubCutaneous at bedtime  insulin lispro (HumaLOG) corrective regimen sliding scale   SubCutaneous every 4 hours  insulin lispro Injectable (HumaLOG) 5 Unit(s) SubCutaneous three times a day before meals  losartan 100 milliGRAM(s) Oral daily  methylPREDNISolone sodium succinate Injectable 90 milliGRAM(s) IV Push two times a day  piperacillin/tazobactam IVPB.. 3.375 Gram(s) IV Intermittent every 8 hours  rivaroxaban 20 milliGRAM(s) Oral with dinner  sodium chloride 0.9%. 1000 milliLiter(s) (150 mL/Hr) IV Continuous <Continuous>  thiamine 100 milliGRAM(s) Oral daily                        PHYSICAL EXAM:    Vital Signs Last 24 Hrs  T(C): 36.7 (07 Apr 2020 21:50), Max: 37 (07 Apr 2020 19:50)  T(F): 98.1 (07 Apr 2020 21:50), Max: 98.6 (07 Apr 2020 19:50)  HR: 85 (07 Apr 2020 21:50) (76 - 93)  BP: 143/77 (08 Apr 2020 05:03) (102/61 - 161/75)  BP(mean): --  RR: 20 (07 Apr 2020 21:50) (20 - 20)  SpO2: 95% (07 Apr 2020 21:50) (91% - 95%)    GENERAL: NAD  NECK: Supple, No JVD  NERVOUS SYSTEM:  Alert & Oriented X3, non focal neuro exam.   CHEST/LUNG: bibasilar crackles, clear upper lobes. wheezes   HEART: Regular rate and rhythm; s1 and s2 auscultated, No murmurs, rubs, or gallops  ABDOMEN: Soft, Nontender, Nondistended; Bowel sounds present and normoactive.   EXTREMITIES:  2+ Peripheral Pulses, No clubbing, cyanosis, or edema HPI:  75 y/o M w/ PMHx Afib (Xarelto), T2DM, HTN, HLD, Anemia, TIA, CVA who presented to ED due to SOB for 2 weeks and fever of 102F. He has also been having diarrhea for 2 weeks on/off. He was admitted from 03/28 to 03/30 for pneumonia and was tested positive for COVID during that admission. He did not have any hypoxia during that time and did not require supplemental oxygen. He was treated with Azithro and Rocephin during that hospitalization.  Patient denies HA, dizziness, LOC, CP, cough, Abd pain, N/V, dysuria. (06 Apr 2020 16:56)    ROS: All review of systems negative unless indicated otherwise below.                          LAB RESULTS                   COMPLETE BLOOD COUNT( 08 Apr 2020 06:06 )                            11.7 g/dL<L>  20.29 K/uL<H> )---------------( 220 K/uL                        35.0 %<L>      Automated Differential     Auto Basophil # - 0.02 K/uL  Auto Basophil % - 0.1 %  Auto Eosinophil # - 0.00 K/uL  Auto Eosinophil % - 0.0 %  Auto Immature Granulocyte # - X      Auto Immature Granulocyte % - 1.4 %  Auto Lymphocyte # - 0.96 K/uL<L>  Auto Lymphocyte % - 4.7 %<L>  Auto Monocyte # - 0.27 K/uL  Auto Monocyte % - 1.3 %<L>  Auto Neutrophil # - 18.75 K/uL<H>  Auto Neutrophil % - 92.5 %<H>                                  CHEMISTRY                 Basic Metabolic Panel (04-08-20 @ 06:06)    135  |  98  |  42.0<H>  ----------------------------<  513<HH>  4.2   |  17.0<L>  |  1.54<H>    Ca    9.3      08 Apr 2020 06:06  Mg     1.5     04-08                    Liver Functions (04-08-20 @ 06:06))  TPro  6.4  /  Alb  2.9  /  TBili  0.2  /  DBili  x   /  AST  42  /  ALT  38  /  AlkPhos  83     PT/INR/PTT ( 28 Mar 2020 06:41 )                        :                       :      24.2         :       47.2                  .        .                   .              .           .       2.09        .                                                             MICROBIOLOGY/CULTURES:    Culture - Blood (collected 03-28-20 @ 03:54)  Source: .Blood  Final Report (04-02-20 @ 05:00):    No growth at 5 days.    Culture - Blood (collected 03-28-20 @ 03:53)  Source: .Blood  Final Report (04-02-20 @ 05:00):    No growth at 5 days.  Rapid RVP Result: NotDetec (03-28-20 @ 02:58)                          RADIOLOGY RESULTS: Personally visualized   < from: Xray Chest 1 View AP/PA. (04.06.20 @ 13:36) >  IMPRESSION: Diffuse bilateral lower lobe infiltrates.    < end of copied text >                          CARDIOLOGY REVIEW: Personally visualized and reviewed  Telemetry Last 24h: SR/ST , 7 WCT, PVCs, Oxygen sat desat to 85, usual range 88-92% on 50% venti                               INTAKE AND OUTPUT - 48 HOUR TREND     04-07-20 @ 07:01  -  04-08-20 @ 07:00  --------------------------------------------------------  IN:  Total IN: 0 mL    OUT:    Voided: 200 mL  Total OUT: 200 mL    Total NET: -200 mL      HOME MEDICATIONS:  amLODIPine 10 mg oral tablet: 1 tab(s) orally once a day (06 Apr 2020 16:23)  aspirin 81 mg oral tablet, chewable: 1 tab(s) orally once a day (06 Apr 2020 16:23)  atorvastatin 40 mg oral tablet: 1 tab(s) orally once a day (06 Apr 2020 16:23)  losartan 100 mg oral tablet: 1 tab(s) orally once a day (06 Apr 2020 16:23)  metFORMIN 500 mg oral tablet: 1 tab(s) orally 2 times a day (06 Apr 2020 16:23)  pioglitazone 45 mg oral tablet: orally once a day (06 Apr 2020 16:23)  Xarelto 20 mg oral tablet: 1 tab(s) orally once a day (in the evening) (06 Apr 2020 16:23)                             Current Admission Active Medications    ALBUTerol    90 MICROgram(s) HFA Inhaler 1 Puff(s) Inhalation every 4 hours PRN Shortness of Breath and/or Wheezing  amLODIPine   Tablet 10 milliGRAM(s) Oral daily  ascorbic acid 500 milliGRAM(s) Oral three times a day  aspirin  chewable 81 milliGRAM(s) Oral daily  atorvastatin 40 milliGRAM(s) Oral at bedtime  benzonatate 100 milliGRAM(s) Oral three times a day PRN Cough  dextrose 40% Gel 15 Gram(s) Oral once PRN Blood Glucose LESS THAN 70 milliGRAM(s)/deciliter  dextrose 5%. 1000 milliLiter(s) (50 mL/Hr) IV Continuous <Continuous>  dextrose 50% Injectable 12.5 Gram(s) IV Push once  dextrose 50% Injectable 25 Gram(s) IV Push once  dextrose 50% Injectable 25 Gram(s) IV Push once  glucagon  Injectable 1 milliGRAM(s) IntraMuscular once PRN Glucose LESS THAN 70 milligrams/deciliter  glucagon  Injectable 1 milliGRAM(s) IntraMuscular once PRN Glucose LESS THAN 70 milligrams/deciliter  hydroxychloroquine   Oral   hydroxychloroquine 200 milliGRAM(s) Oral every 12 hours  insulin glargine Injectable (LANTUS) 15 Unit(s) SubCutaneous at bedtime  insulin lispro (HumaLOG) corrective regimen sliding scale   SubCutaneous every 4 hours  insulin lispro Injectable (HumaLOG) 5 Unit(s) SubCutaneous three times a day before meals  losartan 100 milliGRAM(s) Oral daily  methylPREDNISolone sodium succinate Injectable 90 milliGRAM(s) IV Push two times a day  piperacillin/tazobactam IVPB.. 3.375 Gram(s) IV Intermittent every 8 hours  rivaroxaban 20 milliGRAM(s) Oral with dinner  sodium chloride 0.9%. 1000 milliLiter(s) (150 mL/Hr) IV Continuous <Continuous>  thiamine 100 milliGRAM(s) Oral daily                        PHYSICAL EXAM:    Vital Signs Last 24 Hrs  T(C): 36.7 (07 Apr 2020 21:50), Max: 37 (07 Apr 2020 19:50)  T(F): 98.1 (07 Apr 2020 21:50), Max: 98.6 (07 Apr 2020 19:50)  HR: 85 (07 Apr 2020 21:50) (76 - 93)  BP: 143/77 (08 Apr 2020 05:03) (102/61 - 161/75)  BP(mean): --  RR: 20 (07 Apr 2020 21:50) (20 - 20)  SpO2: 95% (07 Apr 2020 21:50) (91% - 95%)    GENERAL: Lying in bed, no acute distress, disheveld  HEENT - facial flushing - 50%VM+  NECK: Supple, No JVD  NERVOUS SYSTEM:  Alert & Oriented X3, non focal neuro exam.   CHEST/LUNG: bibasilar crackles, clear upper lobes. wheezes   HEART: Regular rate and rhythm; s1 and s2 auscultated, No murmurs, rubs, or gallops  GI/ABDOMEN: Soft, Nontender, Nondistended; Bowel sounds present and normoactive.    - no suprapubic tenderness  MSK/EXTREMITIES:  2+ Peripheral Pulses, No clubbing, cyanosis, or edema  INTEG - Warm dry skin

## 2020-04-08 NOTE — PROGRESS NOTE ADULT - ASSESSMENT
75 y/o M w/ PMHx Afib (Xarelto), T2DM, HTN, HLD, Anemia, TIA, CVA who presented to ED due to SOB for 2 weeks and fever of 102F. He has also been having diarrhea for 2 weeks on/off. He was admitted from 03/28 to 03/30 for pneumonia and was tested positive for COVID during that admission. He did not have any hypoxia during that time and did not require supplemental oxygen. He was treated with Azithro and Rocephin during that hospitalization.      COVID 19 + infection  Viral pneumonia  Hypoxia  HOSSEIN  r/o HAP      - COVID 19 PCR= +  - CXR= diffuse b/l infiltrates  - QTc= 444 ms  - Procalcitonin= not reliable in the setting of renal failure  - Inflammatory markers= elevated  - Baseline NLR calculation for risk stratification=   10-->24      (NLR <3 low vs >5 high)  - Data regarding treatment of COVID 19 is rapidly evolving. Optimized supportive care remains the mainstay of therapy  - Limited data to support Hydroxychloroquine +/- Azithromycin, will need to monitor for  QT prolongation,  hepatotoxicity, hypoglycemia when using these agents  - Hydroxychloroquine  400 mg po q12h x 1 day then 200 mg po q12h x 4 days   - c/w Zosyn  - Ct chest. elevated D dimer concerning for thromboembolic event however patient is on xarelto for A fib  - Monitor QTc  - Check CBC with diff, CMP with LFT's, Inflammatory markers (ESR, CRP, Ferritin, LDH,  procalcitonin)  q48h.  D dimer, lactate, troponin, CK, PT/PTT x 1  - Steroids may be considered in ARDS as per Surviving Sepsis COVID guidelines-limit to short course due to DKA  - May consider vitamin C, thiamine and zinc - note lack of evidence to support benefit with COVID 19  - Encourage self proning q2h and ambulation as tolerated  - Taper off O2 as tolerated- once tolerating room air would ideally monitor for 24h prior to discharge. Continue self quarantine at home x 14 days from date of positive COVID 19 PCR  - Inpatient contact/airborne isolation  - If clinically deteriorating with worsening hypoxia and impending ARDS- please send cytokine panel, quantiferon gold, hepatitis panel. Will consider for Interleukin inhibitor on a case by case basis- limited data out of [place] to support use, ongoing clinical trials in the [place].      Will follow

## 2020-04-08 NOTE — PROGRESS NOTE ADULT - SUBJECTIVE AND OBJECTIVE BOX
[facility]  INFECTIOUS DISEASES AND INTERNAL MEDICINE at [place]  =======================================================  Jerry Sommer MD  Diplomates American Board of Internal Medicine and Infectious Diseases  Tel: [phone]      Fax: [phone]  =======================================================    BOBY EDWARDS [id number]    Follow up:    Allergies:  No Known Allergies      Medications:  ALBUTerol    90 MICROgram(s) HFA Inhaler 1 Puff(s) Inhalation every 4 hours PRN  amLODIPine   Tablet 10 milliGRAM(s) Oral daily  ascorbic acid 500 milliGRAM(s) Oral three times a day  aspirin  chewable 81 milliGRAM(s) Oral daily  atorvastatin 40 milliGRAM(s) Oral at bedtime  benzonatate 100 milliGRAM(s) Oral three times a day PRN  dextrose 40% Gel 15 Gram(s) Oral once PRN  dextrose 5%. 1000 milliLiter(s) IV Continuous <Continuous>  dextrose 50% Injectable 12.5 Gram(s) IV Push once  dextrose 50% Injectable 25 Gram(s) IV Push once  dextrose 50% Injectable 25 Gram(s) IV Push once  glucagon  Injectable 1 milliGRAM(s) IntraMuscular once PRN  glucagon  Injectable 1 milliGRAM(s) IntraMuscular once PRN  hydroxychloroquine   Oral   hydroxychloroquine 200 milliGRAM(s) Oral every 12 hours  insulin glargine Injectable (LANTUS) 15 Unit(s) SubCutaneous at bedtime  insulin lispro (HumaLOG) corrective regimen sliding scale   SubCutaneous every 4 hours  insulin lispro Injectable (HumaLOG) 5 Unit(s) SubCutaneous three times a day before meals  losartan 100 milliGRAM(s) Oral daily  methylPREDNISolone sodium succinate Injectable 90 milliGRAM(s) IV Push two times a day  piperacillin/tazobactam IVPB.. 3.375 Gram(s) IV Intermittent every 8 hours  rivaroxaban 20 milliGRAM(s) Oral with dinner  sodium chloride 0.9%. 1000 milliLiter(s) IV Continuous <Continuous>  thiamine 100 milliGRAM(s) Oral daily            REVIEW OF SYSTEMS:  CONSTITUTIONAL:  No Fever or chills  HEENT:   No diplopia or blurred vision.  No earache, sore throat or runny nose.  CARDIOVASCULAR:  No pressure, squeezing, strangling, tightness, heaviness or aching about the chest, neck, axilla or epigastrium.  RESPIRATORY:  No cough, shortness of breath  GASTROINTESTINAL:  No nausea, vomiting or diarrhea.  GENITOURINARY:  No dysuria, frequency or urgency. No Blood in urine  MUSCULOSKELETAL:  no joint aches, no muscle pain  SKIN:  No change in skin, hair or nails.  NEUROLOGIC:  No Headaches, seizures or weakness.  PSYCHIATRIC:  No disorder of thought or mood.  ENDOCRINE:  No heat or cold intolerance  HEMATOLOGICAL:  No easy bruising or bleeding.            Physical Exam:  ICU Vital Signs Last 24 Hrs  T(C): 36.5 (08 Apr 2020 10:57), Max: 36.7 (07 Apr 2020 21:50)  T(F): 97.7 (08 Apr 2020 10:57), Max: 98.1 (07 Apr 2020 21:50)  HR: 88 (08 Apr 2020 10:57) (85 - 88)  BP: 149/65 (08 Apr 2020 10:57) (143/77 - 161/75)  BP(mean): --  ABP: --  ABP(mean): --  RR: 20 (08 Apr 2020 10:57) (20 - 20)  SpO2: 96% (08 Apr 2020 10:57) (95% - 96%)      GEN: NAD, pleasant  HEENT: normocephalic and atraumatic. EOMI. PERRL.  Anicteric   NECK: Supple.   LUNGS: Clear to auscultation.  HEART: Regular rate and rhythm without murmur.  ABDOMEN: Soft, nontender, and nondistended.  Positive bowel sounds.    : No CVA tenderness  EXTREMITIES: Without any edema.  MSK: no joint swelling  NEUROLOGIC: Cranial nerves II through XII are grossly intact. No focal deficits  PSYCHIATRIC: Appropriate affect .  SKIN: No Rash      Labs: [facility]  INFECTIOUS DISEASES AND INTERNAL MEDICINE at [place]  =======================================================  Jerry Sommer MD  Diplomates American Board of Internal Medicine and Infectious Diseases  Tel: [phone]      Fax: [phone]  =======================================================    BOBY EDWARDS [id number]    Follow up: covid 19 r/o pneumonia  DKA  feeling better today    Allergies:  No Known Allergies      Medications:  ALBUTerol    90 MICROgram(s) HFA Inhaler 1 Puff(s) Inhalation every 4 hours PRN  amLODIPine   Tablet 10 milliGRAM(s) Oral daily  ascorbic acid 500 milliGRAM(s) Oral three times a day  aspirin  chewable 81 milliGRAM(s) Oral daily  atorvastatin 40 milliGRAM(s) Oral at bedtime  benzonatate 100 milliGRAM(s) Oral three times a day PRN  dextrose 40% Gel 15 Gram(s) Oral once PRN  dextrose 5%. 1000 milliLiter(s) IV Continuous <Continuous>  dextrose 50% Injectable 12.5 Gram(s) IV Push once  dextrose 50% Injectable 25 Gram(s) IV Push once  dextrose 50% Injectable 25 Gram(s) IV Push once  glucagon  Injectable 1 milliGRAM(s) IntraMuscular once PRN  glucagon  Injectable 1 milliGRAM(s) IntraMuscular once PRN  hydroxychloroquine   Oral   hydroxychloroquine 200 milliGRAM(s) Oral every 12 hours  insulin glargine Injectable (LANTUS) 15 Unit(s) SubCutaneous at bedtime  insulin lispro (HumaLOG) corrective regimen sliding scale   SubCutaneous every 4 hours  insulin lispro Injectable (HumaLOG) 5 Unit(s) SubCutaneous three times a day before meals  losartan 100 milliGRAM(s) Oral daily  methylPREDNISolone sodium succinate Injectable 90 milliGRAM(s) IV Push two times a day  piperacillin/tazobactam IVPB.. 3.375 Gram(s) IV Intermittent every 8 hours  rivaroxaban 20 milliGRAM(s) Oral with dinner  sodium chloride 0.9%. 1000 milliLiter(s) IV Continuous <Continuous>  thiamine 100 milliGRAM(s) Oral daily            REVIEW OF SYSTEMS:  CONSTITUTIONAL:  No Fever or chills  HEENT:   No diplopia or blurred vision.  No earache, sore throat or runny nose.  CARDIOVASCULAR:  No pressure, squeezing, strangling, tightness, heaviness or aching about the chest, neck, axilla or epigastrium.  RESPIRATORY:  No cough, shortness of breath  GASTROINTESTINAL:  No nausea, vomiting or diarrhea.  GENITOURINARY:  No dysuria, frequency or urgency. No Blood in urine  MUSCULOSKELETAL:  no joint aches, no muscle pain  SKIN:  No change in skin, hair or nails.  NEUROLOGIC:  No Headaches, seizures or weakness.  PSYCHIATRIC:  No disorder of thought or mood.  ENDOCRINE:  No heat or cold intolerance  HEMATOLOGICAL:  No easy bruising or bleeding.            Physical Exam:  ICU Vital Signs Last 24 Hrs  T(C): 36.5 (08 Apr 2020 10:57), Max: 36.7 (07 Apr 2020 21:50)  T(F): 97.7 (08 Apr 2020 10:57), Max: 98.1 (07 Apr 2020 21:50)  HR: 88 (08 Apr 2020 10:57) (85 - 88)  BP: 149/65 (08 Apr 2020 10:57) (143/77 - 161/75)  BP(mean): --  ABP: --  ABP(mean): --  RR: 20 (08 Apr 2020 10:57) (20 - 20)  SpO2: 96% (08 Apr 2020 10:57) (95% - 96%)      GEN: NAD, pleasant [place] on VM  HEENT: normocephalic and atraumatic. EOMI. PERRL.  Anicteric   NECK: Supple.   LUNGS: Clear to auscultation.  HEART: Regular rate and rhythm without murmur.  ABDOMEN: Soft, nontender, and nondistended.  Positive bowel sounds.    : No CVA tenderness  EXTREMITIES: Without any edema.  MSK: no joint swelling  NEUROLOGIC: Cranial nerves II through XII are grossly intact. No focal deficits  PSYCHIATRIC: Appropriate affect .  SKIN: No Rash      Labs:

## 2020-04-09 LAB
ALBUMIN SERPL ELPH-MCNC: 2.8 G/DL — LOW (ref 3.3–5.2)
ALP SERPL-CCNC: 85 U/L — SIGNIFICANT CHANGE UP (ref 40–120)
ALT FLD-CCNC: 55 U/L — HIGH
ANION GAP SERPL CALC-SCNC: 16 MMOL/L — SIGNIFICANT CHANGE UP (ref 5–17)
AST SERPL-CCNC: 47 U/L — HIGH
BASOPHILS # BLD AUTO: 0 K/UL — SIGNIFICANT CHANGE UP (ref 0–0.2)
BASOPHILS NFR BLD AUTO: 0 % — SIGNIFICANT CHANGE UP (ref 0–2)
BILIRUB SERPL-MCNC: 0.3 MG/DL — LOW (ref 0.4–2)
BUN SERPL-MCNC: 35 MG/DL — HIGH (ref 8–20)
CALCIUM SERPL-MCNC: 8.9 MG/DL — SIGNIFICANT CHANGE UP (ref 8.6–10.2)
CHLORIDE SERPL-SCNC: 105 MMOL/L — SIGNIFICANT CHANGE UP (ref 98–107)
CO2 SERPL-SCNC: 20 MMOL/L — LOW (ref 22–29)
CREAT SERPL-MCNC: 1.12 MG/DL — SIGNIFICANT CHANGE UP (ref 0.5–1.3)
CRP SERPL-MCNC: 3.04 MG/DL — HIGH (ref 0–0.4)
DACRYOCYTES BLD QL SMEAR: SLIGHT — SIGNIFICANT CHANGE UP
ELLIPTOCYTES BLD QL SMEAR: SLIGHT — SIGNIFICANT CHANGE UP
EOSINOPHIL # BLD AUTO: 0 K/UL — SIGNIFICANT CHANGE UP (ref 0–0.5)
EOSINOPHIL NFR BLD AUTO: 0 % — SIGNIFICANT CHANGE UP (ref 0–6)
FERRITIN SERPL-MCNC: 1887 NG/ML — HIGH (ref 30–400)
GLUCOSE BLDC GLUCOMTR-MCNC: 264 MG/DL — HIGH (ref 70–99)
GLUCOSE BLDC GLUCOMTR-MCNC: 298 MG/DL — HIGH (ref 70–99)
GLUCOSE BLDC GLUCOMTR-MCNC: 348 MG/DL — HIGH (ref 70–99)
GLUCOSE BLDC GLUCOMTR-MCNC: 370 MG/DL — HIGH (ref 70–99)
GLUCOSE BLDC GLUCOMTR-MCNC: 373 MG/DL — HIGH (ref 70–99)
GLUCOSE BLDC GLUCOMTR-MCNC: 486 MG/DL — CRITICAL HIGH (ref 70–99)
GLUCOSE SERPL-MCNC: 277 MG/DL — HIGH (ref 70–99)
HCT VFR BLD CALC: 35.2 % — LOW (ref 39–50)
HGB BLD-MCNC: 11.6 G/DL — LOW (ref 13–17)
HYPOCHROMIA BLD QL: SLIGHT — SIGNIFICANT CHANGE UP
LYMPHOCYTES # BLD AUTO: 1.37 K/UL — SIGNIFICANT CHANGE UP (ref 1–3.3)
LYMPHOCYTES # BLD AUTO: 6.2 % — LOW (ref 13–44)
MANUAL SMEAR VERIFICATION: SIGNIFICANT CHANGE UP
MCHC RBC-ENTMCNC: 28.6 PG — SIGNIFICANT CHANGE UP (ref 27–34)
MCHC RBC-ENTMCNC: 33 GM/DL — SIGNIFICANT CHANGE UP (ref 32–36)
MCV RBC AUTO: 86.9 FL — SIGNIFICANT CHANGE UP (ref 80–100)
MONOCYTES # BLD AUTO: 0 K/UL — SIGNIFICANT CHANGE UP (ref 0–0.9)
MONOCYTES NFR BLD AUTO: 0 % — LOW (ref 2–14)
NEUTROPHILS # BLD AUTO: 20.76 K/UL — HIGH (ref 1.8–7.4)
NEUTROPHILS NFR BLD AUTO: 92.9 % — HIGH (ref 43–77)
NEUTS BAND # BLD: 0.9 % — SIGNIFICANT CHANGE UP (ref 0–8)
NRBC # BLD: 1 /100 — HIGH (ref 0–0)
OVALOCYTES BLD QL SMEAR: SLIGHT — SIGNIFICANT CHANGE UP
PLAT MORPH BLD: NORMAL — SIGNIFICANT CHANGE UP
PLATELET # BLD AUTO: 237 K/UL — SIGNIFICANT CHANGE UP (ref 150–400)
PLATELET COUNT - ESTIMATE: NORMAL — SIGNIFICANT CHANGE UP
POLYCHROMASIA BLD QL SMEAR: SLIGHT — SIGNIFICANT CHANGE UP
POTASSIUM SERPL-MCNC: 4.5 MMOL/L — SIGNIFICANT CHANGE UP (ref 3.5–5.3)
POTASSIUM SERPL-SCNC: 4.5 MMOL/L — SIGNIFICANT CHANGE UP (ref 3.5–5.3)
PROT SERPL-MCNC: 6.6 G/DL — SIGNIFICANT CHANGE UP (ref 6.6–8.7)
RBC # BLD: 4.05 M/UL — LOW (ref 4.2–5.8)
RBC # FLD: 14.1 % — SIGNIFICANT CHANGE UP (ref 10.3–14.5)
RBC BLD AUTO: NORMAL — SIGNIFICANT CHANGE UP
SMUDGE CELLS # BLD: PRESENT — SIGNIFICANT CHANGE UP
SODIUM SERPL-SCNC: 141 MMOL/L — SIGNIFICANT CHANGE UP (ref 135–145)
WBC # BLD: 22.13 K/UL — HIGH (ref 3.8–10.5)
WBC # FLD AUTO: 22.13 K/UL — HIGH (ref 3.8–10.5)

## 2020-04-09 PROCEDURE — 99232 SBSQ HOSP IP/OBS MODERATE 35: CPT

## 2020-04-09 RX ORDER — INSULIN LISPRO 100/ML
VIAL (ML) SUBCUTANEOUS
Refills: 0 | Status: DISCONTINUED | OUTPATIENT
Start: 2020-04-09 | End: 2020-04-17

## 2020-04-09 RX ORDER — INSULIN LISPRO 100/ML
VIAL (ML) SUBCUTANEOUS AT BEDTIME
Refills: 0 | Status: DISCONTINUED | OUTPATIENT
Start: 2020-04-09 | End: 2020-04-17

## 2020-04-09 RX ORDER — INSULIN LISPRO 100/ML
7 VIAL (ML) SUBCUTANEOUS
Refills: 0 | Status: DISCONTINUED | OUTPATIENT
Start: 2020-04-09 | End: 2020-04-10

## 2020-04-09 RX ORDER — INSULIN GLARGINE 100 [IU]/ML
20 INJECTION, SOLUTION SUBCUTANEOUS AT BEDTIME
Refills: 0 | Status: DISCONTINUED | OUTPATIENT
Start: 2020-04-09 | End: 2020-04-10

## 2020-04-09 RX ADMIN — RIVAROXABAN 20 MILLIGRAM(S): KIT at 18:04

## 2020-04-09 RX ADMIN — Medication 7 UNIT(S): at 18:03

## 2020-04-09 RX ADMIN — Medication 12: at 20:24

## 2020-04-09 RX ADMIN — Medication 90 MILLIGRAM(S): at 18:04

## 2020-04-09 RX ADMIN — Medication 9: at 01:05

## 2020-04-09 RX ADMIN — Medication 9: at 06:17

## 2020-04-09 RX ADMIN — Medication 200 MILLIGRAM(S): at 07:01

## 2020-04-09 RX ADMIN — Medication 100 MILLIGRAM(S): at 18:04

## 2020-04-09 RX ADMIN — Medication 500 MILLIGRAM(S): at 07:01

## 2020-04-09 RX ADMIN — Medication 200 MILLIGRAM(S): at 18:05

## 2020-04-09 RX ADMIN — Medication 7 UNIT(S): at 10:56

## 2020-04-09 RX ADMIN — Medication 500 MILLIGRAM(S): at 10:56

## 2020-04-09 RX ADMIN — Medication 500 MILLIGRAM(S): at 20:18

## 2020-04-09 RX ADMIN — PIPERACILLIN AND TAZOBACTAM 25 GRAM(S): 4; .5 INJECTION, POWDER, LYOPHILIZED, FOR SOLUTION INTRAVENOUS at 14:35

## 2020-04-09 RX ADMIN — ATORVASTATIN CALCIUM 40 MILLIGRAM(S): 80 TABLET, FILM COATED ORAL at 20:18

## 2020-04-09 RX ADMIN — LOSARTAN POTASSIUM 100 MILLIGRAM(S): 100 TABLET, FILM COATED ORAL at 07:01

## 2020-04-09 RX ADMIN — Medication 12: at 10:56

## 2020-04-09 RX ADMIN — Medication 81 MILLIGRAM(S): at 10:55

## 2020-04-09 RX ADMIN — Medication 15: at 18:03

## 2020-04-09 RX ADMIN — INSULIN GLARGINE 20 UNIT(S): 100 INJECTION, SOLUTION SUBCUTANEOUS at 20:24

## 2020-04-09 RX ADMIN — Medication 90 MILLIGRAM(S): at 07:02

## 2020-04-09 RX ADMIN — Medication 100 MILLIGRAM(S): at 10:55

## 2020-04-09 RX ADMIN — AMLODIPINE BESYLATE 10 MILLIGRAM(S): 2.5 TABLET ORAL at 07:01

## 2020-04-09 NOTE — PROGRESS NOTE ADULT - ATTENDING COMMENTS
Acute hypoxic respiratory failure secondary to COVID pneumonia - slow interval improvement from NRB to VM. Acute phase reactants down trending  DKA resolved - still with uncontrolled hyperglycemia, increased Insulins. Encourage PO fluid intake.  HOSSEIN - resolved

## 2020-04-09 NOTE — PROGRESS NOTE ADULT - ASSESSMENT
73 y/o M w/ PMHx Afib (Xarelto), T2DM, HTN, HLD, Anemia, TIA, CVA who presented to ED due to SOB for 2 weeks and fever of 102F. He has also been having diarrhea for 2 weeks on/off. He was admitted from 03/28 to 03/30 for pneumonia and was tested positive for COVID during that admission. He did not have any hypoxia during that time and did not require supplemental oxygen. He was treated with Azithro and Rocephin during that hospitalization.      COVID 19 + infection  Viral pneumonia  Hypoxia  Leukocytosis on steroids      - COVID 19 PCR= +  - CXR= diffuse b/l infiltrates  - CTa negative for PE + GGO   - QTc= 444 ms  - Procalcitonin= not reliable in the setting of renal failure  - Inflammatory markers= improved ferritin and CRP  - Baseline NLR calculation for risk stratification=   10-->24      (NLR <3 low vs >5 high)  - Data regarding treatment of COVID 19 is rapidly evolving. Optimized supportive care remains the mainstay of therapy  - Limited data to support Hydroxychloroquine +/- Azithromycin, will need to monitor for  QT prolongation,  hepatotoxicity, hypoglycemia when using these agents  - Hydroxychloroquine  400 mg po q12h x 1 day then 200 mg po q12h x 4 days   - Dc zosyn  - Monitor QTc  - Check CBC with diff, CMP with LFT's, Inflammatory markers (ESR, CRP, Ferritin, LDH,  procalcitonin)  q48h.  D dimer, lactate, troponin, CK, PT/PTT x 1  - Steroids may be considered in ARDS as per Surviving Sepsis COVID guidelines-limit to short course due to DKA  - May consider vitamin C, thiamine and zinc - note lack of evidence to support benefit with COVID 19  - Encourage self proning q2h and ambulation as tolerated  - Taper off O2 as tolerated- once tolerating room air would ideally monitor for 24h prior to discharge. Continue self quarantine at home x 14 days from date of positive COVID 19 PCR  - Inpatient contact/airborne isolation  - If clinically deteriorating with worsening hypoxia and impending ARDS- please send cytokine panel, quantiferon gold, hepatitis panel. Will consider for Interleukin inhibitor on a case by case basis- limited data out of [place] to support use, ongoing clinical trials in the [place].      Will follow

## 2020-04-09 NOTE — PROGRESS NOTE ADULT - ASSESSMENT
· Assessment		  75 y/o M w/ PMHx Afib (Xarelto), T2DM, HTN, HLD, Anemia, TIA, CVA who presented to ED due to acute hypoxic respiratory distress likely secondary to COVID pneumonia versus superimposed bacterial pneumonia     Hypoxic Respiratory Failure, Secondary to Pneumonia, viral  was worsening requiring NRB, then 50% venti, now on 35% venti mask  Patient states he feels significantly better  COVID19 positive  Inflammatory markers elevated  CXR: b/l pneumonia  CTA Chest negative for PE   c/w Plaquenil protocol  c/w Solumedrol 1mg/kg BID  c/w Vitamin C 500mg TID  c/w Thiamine 200mg QD  c/w albuterol prn  c/w tessalon perles prn for cough  Started on IV antibiotics by ID  Low threshold for ventilation if progressive worsening    Atrial fibrillation   converted to NSR   rate controlled  Recently stopped Coreg, stated it was too strong for him  CHADS2 score: 5  HAS-BLED: 4  c/w ASA  c/w rivaroxaban for anticoagulation    HOSSEIN  2/2 to steroid induced DKA/dehydration  creatinine up trending  labs pending for this AM  stop IVF   monitor I/O     Anion Gap Diabetic Ketoacidosis   Anion gap closed - 17 from 20  HCO3 improved 19 from 17  POCT  - 340   HbA1c: 11.9% (4/6/20)  repeat labs this AM   continue ISS w/ premeal and lantus   Increase lantus to 20   increase premeal to 7  change frequency from q4h to ACHS  stop IVF  monitor electrolytes replete as necessary    Hypertension  Close blood pressure monitoring  c/w Losartan 100 QD    Hx of CVA  no new neurological symptoms  c/w statin    Prophylactic Measure  Adequately anticoagulated with Xarelto    Prognosis - guarded  Code Status: full code  Disposition - Pending clinical course    Emergency contact: Jessica (wife) [phone]

## 2020-04-09 NOTE — PROGRESS NOTE ADULT - SUBJECTIVE AND OBJECTIVE BOX
[facility]  INFECTIOUS DISEASES AND INTERNAL MEDICINE at [place]  =======================================================  Jerry Sommer MD  Diplomates American Board of Internal Medicine and Infectious Diseases  Tel: [phone]      Fax: [phone]  =======================================================    [facility]AR[facility] [id number]    Follow up: covid 19  feels better    Allergies:  No Known Allergies      Medications:  ALBUTerol    90 MICROgram(s) HFA Inhaler 1 Puff(s) Inhalation every 4 hours PRN  amLODIPine   Tablet 10 milliGRAM(s) Oral daily  ascorbic acid 500 milliGRAM(s) Oral three times a day  aspirin  chewable 81 milliGRAM(s) Oral daily  atorvastatin 40 milliGRAM(s) Oral at bedtime  benzonatate 100 milliGRAM(s) Oral three times a day PRN  dextrose 40% Gel 15 Gram(s) Oral once PRN  dextrose 5%. 1000 milliLiter(s) IV Continuous <Continuous>  dextrose 50% Injectable 12.5 Gram(s) IV Push once  dextrose 50% Injectable 25 Gram(s) IV Push once  dextrose 50% Injectable 25 Gram(s) IV Push once  glucagon  Injectable 1 milliGRAM(s) IntraMuscular once PRN  glucagon  Injectable 1 milliGRAM(s) IntraMuscular once PRN  hydroxychloroquine   Oral   hydroxychloroquine 200 milliGRAM(s) Oral every 12 hours  insulin glargine Injectable (LANTUS) 20 Unit(s) SubCutaneous at bedtime  insulin lispro (HumaLOG) corrective regimen sliding scale   SubCutaneous three times a day before meals  insulin lispro (HumaLOG) corrective regimen sliding scale   SubCutaneous at bedtime  insulin lispro Injectable (HumaLOG) 7 Unit(s) SubCutaneous before breakfast  insulin lispro Injectable (HumaLOG) 7 Unit(s) SubCutaneous before lunch  insulin lispro Injectable (HumaLOG) 7 Unit(s) SubCutaneous before dinner  losartan 100 milliGRAM(s) Oral daily  methylPREDNISolone sodium succinate Injectable 90 milliGRAM(s) IV Push two times a day  rivaroxaban 20 milliGRAM(s) Oral with dinner  thiamine 100 milliGRAM(s) Oral daily            REVIEW OF SYSTEMS:  CONSTITUTIONAL:  No Fever or chills  HEENT:   No diplopia or blurred vision.  No earache, sore throat or runny nose.  CARDIOVASCULAR:  No pressure, squeezing, strangling, tightness, heaviness or aching about the chest, neck, axilla or epigastrium.  RESPIRATORY:  No cough, shortness of breath  GASTROINTESTINAL:  No nausea, vomiting or diarrhea.  GENITOURINARY:  No dysuria, frequency or urgency. No Blood in urine  MUSCULOSKELETAL:  no joint aches, no muscle pain  SKIN:  No change in skin, hair or nails.  NEUROLOGIC:  No Headaches, seizures or weakness.  PSYCHIATRIC:  No disorder of thought or mood.  ENDOCRINE:  No heat or cold intolerance  HEMATOLOGICAL:  No easy bruising or bleeding.            Physical Exam:  ICU Vital Signs Last 24 Hrs  T(C): 36.7 (08 Apr 2020 22:11), Max: 36.7 (08 Apr 2020 22:11)  T(F): 98 (08 Apr 2020 22:11), Max: 98 (08 Apr 2020 22:11)  HR: 74 (08 Apr 2020 22:11) (74 - 74)  BP: 135/69 (08 Apr 2020 22:11) (135/69 - 135/69)  BP(mean): --  ABP: --  ABP(mean): --  RR: 20 (08 Apr 2020 22:11) (20 - 20)  SpO2: 96% (08 Apr 2020 22:11) (96% - 96%)      GEN: NAD, pleasant on VM [place]  HEENT: normocephalic and atraumatic. EOMI. PERRL.  Anicteric   NECK: Supple.   LUNGS: Clear to auscultation.  HEART: Regular rate and rhythm without murmur.  ABDOMEN: Soft, nontender, and nondistended.  Positive bowel sounds.    : No CVA tenderness  EXTREMITIES: Without any edema.  MSK: no joint swelling  NEUROLOGIC: Cranial nerves II through XII are grossly intact. No focal deficits  PSYCHIATRIC: Appropriate affect .  SKIN: No Rash      Labs:                          11.6   22.13 )-----------( 237      ( 09 Apr 2020 07:33 )             35.2     04-09    141  |  105  |  35.0<H>  ----------------------------<  277<H>  4.5   |  20.0<L>  |  1.12    Ca    8.9      09 Apr 2020 07:33  Phos  2.2     04-08  Mg     1.8     04-08    TPro  6.6  /  Alb  2.8<L>  /  TBili  0.3<L>  /  DBili  x   /  AST  47<H>  /  ALT  55<H>  /  AlkPhos  85  04-09      < from: CT Angio Chest w/ IV Cont (04.08.20 @ 21:41) >  FINDINGS:    LUNGS AND AIRWAYS: Patent central airways.  Diffusely scattered groundglass opacities with peripheral predominance.    PLEURA: No pleural effusion.    MEDIASTINUM AND SINGH: No lymphadenopathy.    VESSELS: Evaluation for pulmonary embolus is limited due to respiratory motion artifact and suboptimal opacification. No pulmonary embolus up to and including lobar branches. Segmental and subsegmental branches are not well evaluated.    HEART: Heart size is normal. No pericardial effusion.    CHEST WALL AND LOWER NECK: Within normal limits.    VISUALIZED UPPER ABDOMEN: Incidental note of a 3.3 cm lipoma of the splenic flexure..    BONES: Degenerative changes    IMPRESSION:     Pattern of GGO suggests infection including atypical pneumonia/viral infection from atypical agents including COVID-19 (C19V-1).    No evidence for central pulmonary embolus. Segmental and subsegmental branches are not well evaluated.          < end of copied text >

## 2020-04-09 NOTE — PROGRESS NOTE ADULT - SUBJECTIVE AND OBJECTIVE BOX
HPI:  75 y/o M w/ PMHx Afib (Xarelto), T2DM, HTN, HLD, Anemia, TIA, CVA who presented to ED due to SOB for 2 weeks and fever of 102F. He has also been having diarrhea for 2 weeks on/off. He was admitted from 03/28 to 03/30 for pneumonia and was tested positive for COVID during that admission. He did not have any hypoxia during that time and did not require supplemental oxygen. He was treated with Azithro and Rocephin during that hospitalization.  Patient denies HA, dizziness, LOC, CP, cough, Abd pain, N/V, dysuria. (06 Apr 2020 16:56)      ROS: All review of systems negative unless indicated otherwise below.                          LAB RESULTS                   COMPLETE BLOOD COUNT( 09 Apr 2020 07:33 )                            11.6 g/dL<L>  X     )---------------( 237 K/uL                        35.2 %<L>      Automated Differential     Auto Basophil # - X      Auto Basophil % - X      Auto Eosinophil # - X      Auto Eosinophil % - X      Auto Immature Granulocyte # - X      Auto Immature Granulocyte % - X      Auto Lymphocyte # - X      Auto Lymphocyte % - X      Auto Monocyte # - X      Auto Monocyte % - X      Auto Neutrophil # - X      Auto Neutrophil % - X                                      CHEMISTRY                 Basic Metabolic Panel (04-08-20 @ 19:26)    140  |  104  |  41.0<H>  ----------------------------<  328<H>  3.9   |  19.0<L>  |  1.39<H>    Ca    8.9      08 Apr 2020 19:26  Phos  2.2     04-08  Mg     1.8     04-08                    Liver Functions (04-08-20 @ 06:06))  TPro  6.4  /  Alb  2.9  /  TBili  0.2  /  DBili  x   /  AST  42  /  ALT  38  /  AlkPhos  83     PT/INR/PTT ( 28 Mar 2020 06:41 )                        :                       :      24.2         :       47.2                  .        .                   .              .           .       2.09        .                                                             MICROBIOLOGY/CULTURES:    Culture - Blood (collected 03-28-20 @ 03:54)  Source: .Blood  Final Report (04-02-20 @ 05:00):    No growth at 5 days.    Culture - Blood (collected 03-28-20 @ 03:53)  Source: .Blood  Final Report (04-02-20 @ 05:00):    No growth at 5 days.    Rapid RVP Result: NotDetec (03-28-20 @ 02:58)                          RADIOLOGY RESULTS: Personally visualized   < from: CT Angio Chest w/ IV Cont (04.08.20 @ 21:41) >  IMPRESSION:     Pattern of GGO suggests infection including atypical pneumonia/viral infection from atypical agents including COVID-19 (C19V-1).    No evidence for central pulmonary embolus. Segmental and subsegmental branches are not well evaluated.    < end of copied text >                          CARDIOLOGY REVIEW: Personally visualized and reviewed  Telemetry Last 24h: desat 82% 91% baseline SR 70s                              INTAKE AND OUTPUT - 48 HOUR TREND     04-07-20 @ 07:01  -  04-08-20 @ 07:00  --------------------------------------------------------  IN:  Total IN: 0 mL    OUT:    Voided: 200 mL  Total OUT: 200 mL    Total NET: -200 mL      04-08-20 @ 07:01  -  04-09-20 @ 07:00  --------------------------------------------------------  IN:  Total IN: 0 mL    OUT:    Voided: 700 mL  Total OUT: 700 mL    Total NET: -700 mL    HOME MEDICATIONS:  amLODIPine 10 mg oral tablet: 1 tab(s) orally once a day (06 Apr 2020 16:23)  aspirin 81 mg oral tablet, chewable: 1 tab(s) orally once a day (06 Apr 2020 16:23)  atorvastatin 40 mg oral tablet: 1 tab(s) orally once a day (06 Apr 2020 16:23)  losartan 100 mg oral tablet: 1 tab(s) orally once a day (06 Apr 2020 16:23)  metFORMIN 500 mg oral tablet: 1 tab(s) orally 2 times a day (06 Apr 2020 16:23)  pioglitazone 45 mg oral tablet: orally once a day (06 Apr 2020 16:23)  Xarelto 20 mg oral tablet: 1 tab(s) orally once a day (in the evening) (06 Apr 2020 16:23)                             Current Admission Active Medications  ALBUTerol    90 MICROgram(s) HFA Inhaler 1 Puff(s) Inhalation every 4 hours PRN Shortness of Breath and/or Wheezing  amLODIPine   Tablet 10 milliGRAM(s) Oral daily  ascorbic acid 500 milliGRAM(s) Oral three times a day  aspirin  chewable 81 milliGRAM(s) Oral daily  atorvastatin 40 milliGRAM(s) Oral at bedtime  benzonatate 100 milliGRAM(s) Oral three times a day PRN Cough  dextrose 40% Gel 15 Gram(s) Oral once PRN Blood Glucose LESS THAN 70 milliGRAM(s)/deciliter  dextrose 5%. 1000 milliLiter(s) (50 mL/Hr) IV Continuous <Continuous>  dextrose 50% Injectable 12.5 Gram(s) IV Push once  dextrose 50% Injectable 25 Gram(s) IV Push once  dextrose 50% Injectable 25 Gram(s) IV Push once  glucagon  Injectable 1 milliGRAM(s) IntraMuscular once PRN Glucose LESS THAN 70 milligrams/deciliter  glucagon  Injectable 1 milliGRAM(s) IntraMuscular once PRN Glucose LESS THAN 70 milligrams/deciliter  hydroxychloroquine   Oral   hydroxychloroquine 200 milliGRAM(s) Oral every 12 hours  insulin glargine Injectable (LANTUS) 15 Unit(s) SubCutaneous at bedtime  insulin lispro (HumaLOG) corrective regimen sliding scale   SubCutaneous every 4 hours  insulin lispro Injectable (HumaLOG) 5 Unit(s) SubCutaneous three times a day before meals  losartan 100 milliGRAM(s) Oral daily  methylPREDNISolone sodium succinate Injectable 90 milliGRAM(s) IV Push two times a day  piperacillin/tazobactam IVPB.. 3.375 Gram(s) IV Intermittent every 8 hours  rivaroxaban 20 milliGRAM(s) Oral with dinner  sodium chloride 0.9%. 1000 milliLiter(s) (150 mL/Hr) IV Continuous <Continuous>  thiamine 100 milliGRAM(s) Oral daily                        PHYSICAL EXAM:  Vital Signs Last 24 Hrs  T(C): 36.7 (08 Apr 2020 22:11), Max: 36.7 (08 Apr 2020 22:11)  T(F): 98 (08 Apr 2020 22:11), Max: 98 (08 Apr 2020 22:11)  HR: 74 (08 Apr 2020 22:11) (74 - 88)  BP: 135/69 (08 Apr 2020 22:11) (135/69 - 149/65)  BP(mean): --  RR: 20 (08 Apr 2020 22:11) (20 - 20)  SpO2: 96% (08 Apr 2020 22:11) (96% - 96%)    GENERAL: NAD  NECK: Supple, No JVD  NERVOUS SYSTEM:  Alert & Oriented X3, non focal neuro exam.   CHEST/LUNG: bibasilar crackles, clear upper lobes. wheezes  HEART: Regular rate and rhythm; s1 and s2 auscultated, No murmurs, rubs, or gallops  ABDOMEN: Soft, Nontender, Nondistended; Bowel sounds present and normoactive.   EXTREMITIES:  2+ Peripheral Pulses, No clubbing, cyanosis, or edema

## 2020-04-10 LAB
ACETONE SERPL-MCNC: NEGATIVE — SIGNIFICANT CHANGE UP
ALBUMIN SERPL ELPH-MCNC: 2.9 G/DL — LOW (ref 3.3–5.2)
ALP SERPL-CCNC: 100 U/L — SIGNIFICANT CHANGE UP (ref 40–120)
ALT FLD-CCNC: 61 U/L — HIGH
ANION GAP SERPL CALC-SCNC: 16 MMOL/L — SIGNIFICANT CHANGE UP (ref 5–17)
AST SERPL-CCNC: 39 U/L — SIGNIFICANT CHANGE UP
BASOPHILS # BLD AUTO: 0.03 K/UL — SIGNIFICANT CHANGE UP (ref 0–0.2)
BASOPHILS NFR BLD AUTO: 0.1 % — SIGNIFICANT CHANGE UP (ref 0–2)
BILIRUB SERPL-MCNC: 0.2 MG/DL — LOW (ref 0.4–2)
BUN SERPL-MCNC: 34 MG/DL — HIGH (ref 8–20)
CALCIUM SERPL-MCNC: 8.6 MG/DL — SIGNIFICANT CHANGE UP (ref 8.6–10.2)
CHLORIDE SERPL-SCNC: 104 MMOL/L — SIGNIFICANT CHANGE UP (ref 98–107)
CO2 SERPL-SCNC: 21 MMOL/L — LOW (ref 22–29)
CREAT SERPL-MCNC: 1.18 MG/DL — SIGNIFICANT CHANGE UP (ref 0.5–1.3)
CRP SERPL-MCNC: 1.96 MG/DL — HIGH (ref 0–0.4)
D DIMER BLD IA.RAPID-MCNC: 7450 NG/ML DDU — HIGH
EOSINOPHIL # BLD AUTO: 0.03 K/UL — SIGNIFICANT CHANGE UP (ref 0–0.5)
EOSINOPHIL NFR BLD AUTO: 0.1 % — SIGNIFICANT CHANGE UP (ref 0–6)
FERRITIN SERPL-MCNC: 1349 NG/ML — HIGH (ref 30–400)
GLUCOSE BLDC GLUCOMTR-MCNC: 288 MG/DL — HIGH (ref 70–99)
GLUCOSE BLDC GLUCOMTR-MCNC: 313 MG/DL — HIGH (ref 70–99)
GLUCOSE BLDC GLUCOMTR-MCNC: 343 MG/DL — HIGH (ref 70–99)
GLUCOSE BLDC GLUCOMTR-MCNC: 381 MG/DL — HIGH (ref 70–99)
GLUCOSE BLDC GLUCOMTR-MCNC: 385 MG/DL — HIGH (ref 70–99)
GLUCOSE SERPL-MCNC: 264 MG/DL — HIGH (ref 70–99)
HCT VFR BLD CALC: 33.6 % — LOW (ref 39–50)
HGB BLD-MCNC: 11.4 G/DL — LOW (ref 13–17)
IMM GRANULOCYTES NFR BLD AUTO: 1.6 % — HIGH (ref 0–1.5)
LACTATE BLDV-MCNC: 2.5 MMOL/L — HIGH (ref 0.5–2)
LACTATE SERPL-SCNC: 2.6 MMOL/L — HIGH (ref 0.5–2)
LDH SERPL L TO P-CCNC: 422 U/L — HIGH (ref 98–192)
LYMPHOCYTES # BLD AUTO: 1 K/UL — SIGNIFICANT CHANGE UP (ref 1–3.3)
LYMPHOCYTES # BLD AUTO: 4.7 % — LOW (ref 13–44)
MAGNESIUM SERPL-MCNC: 1.5 MG/DL — LOW (ref 1.6–2.6)
MCHC RBC-ENTMCNC: 29.2 PG — SIGNIFICANT CHANGE UP (ref 27–34)
MCHC RBC-ENTMCNC: 33.9 GM/DL — SIGNIFICANT CHANGE UP (ref 32–36)
MCV RBC AUTO: 86.2 FL — SIGNIFICANT CHANGE UP (ref 80–100)
MONOCYTES # BLD AUTO: 0.62 K/UL — SIGNIFICANT CHANGE UP (ref 0–0.9)
MONOCYTES NFR BLD AUTO: 2.9 % — SIGNIFICANT CHANGE UP (ref 2–14)
NEUTROPHILS # BLD AUTO: 19.1 K/UL — HIGH (ref 1.8–7.4)
NEUTROPHILS NFR BLD AUTO: 90.6 % — HIGH (ref 43–77)
PHOSPHATE SERPL-MCNC: 2.6 MG/DL — SIGNIFICANT CHANGE UP (ref 2.4–4.7)
PLATELET # BLD AUTO: 272 K/UL — SIGNIFICANT CHANGE UP (ref 150–400)
POTASSIUM SERPL-MCNC: 3.9 MMOL/L — SIGNIFICANT CHANGE UP (ref 3.5–5.3)
POTASSIUM SERPL-SCNC: 3.9 MMOL/L — SIGNIFICANT CHANGE UP (ref 3.5–5.3)
PROCALCITONIN SERPL-MCNC: 0.06 NG/ML — SIGNIFICANT CHANGE UP (ref 0.02–0.1)
PROT SERPL-MCNC: 6.3 G/DL — LOW (ref 6.6–8.7)
RBC # BLD: 3.9 M/UL — LOW (ref 4.2–5.8)
RBC # FLD: 13.8 % — SIGNIFICANT CHANGE UP (ref 10.3–14.5)
SODIUM SERPL-SCNC: 141 MMOL/L — SIGNIFICANT CHANGE UP (ref 135–145)
WBC # BLD: 21.12 K/UL — HIGH (ref 3.8–10.5)
WBC # FLD AUTO: 21.12 K/UL — HIGH (ref 3.8–10.5)

## 2020-04-10 PROCEDURE — 99232 SBSQ HOSP IP/OBS MODERATE 35: CPT

## 2020-04-10 RX ORDER — MAGNESIUM SULFATE 500 MG/ML
2 VIAL (ML) INJECTION ONCE
Refills: 0 | Status: COMPLETED | OUTPATIENT
Start: 2020-04-10 | End: 2020-04-10

## 2020-04-10 RX ORDER — INSULIN GLARGINE 100 [IU]/ML
27 INJECTION, SOLUTION SUBCUTANEOUS AT BEDTIME
Refills: 0 | Status: DISCONTINUED | OUTPATIENT
Start: 2020-04-10 | End: 2020-04-11

## 2020-04-10 RX ORDER — INSULIN LISPRO 100/ML
9 VIAL (ML) SUBCUTANEOUS
Refills: 0 | Status: DISCONTINUED | OUTPATIENT
Start: 2020-04-10 | End: 2020-04-11

## 2020-04-10 RX ADMIN — Medication 90 MILLIGRAM(S): at 04:15

## 2020-04-10 RX ADMIN — Medication 200 MILLIGRAM(S): at 16:18

## 2020-04-10 RX ADMIN — Medication 12: at 12:28

## 2020-04-10 RX ADMIN — Medication 9 UNIT(S): at 17:18

## 2020-04-10 RX ADMIN — Medication 15: at 17:18

## 2020-04-10 RX ADMIN — INSULIN GLARGINE 27 UNIT(S): 100 INJECTION, SOLUTION SUBCUTANEOUS at 21:30

## 2020-04-10 RX ADMIN — Medication 200 MILLIGRAM(S): at 04:17

## 2020-04-10 RX ADMIN — Medication 9 UNIT(S): at 12:29

## 2020-04-10 RX ADMIN — Medication 7 UNIT(S): at 09:06

## 2020-04-10 RX ADMIN — RIVAROXABAN 20 MILLIGRAM(S): KIT at 16:20

## 2020-04-10 RX ADMIN — Medication 50 GRAM(S): at 11:36

## 2020-04-10 RX ADMIN — Medication 12: at 09:06

## 2020-04-10 RX ADMIN — Medication 500 MILLIGRAM(S): at 04:16

## 2020-04-10 RX ADMIN — Medication 500 MILLIGRAM(S): at 21:30

## 2020-04-10 RX ADMIN — Medication 9: at 21:31

## 2020-04-10 RX ADMIN — LOSARTAN POTASSIUM 100 MILLIGRAM(S): 100 TABLET, FILM COATED ORAL at 04:16

## 2020-04-10 RX ADMIN — Medication 100 MILLIGRAM(S): at 09:06

## 2020-04-10 RX ADMIN — Medication 81 MILLIGRAM(S): at 09:08

## 2020-04-10 RX ADMIN — Medication 90 MILLIGRAM(S): at 16:20

## 2020-04-10 RX ADMIN — Medication 500 MILLIGRAM(S): at 11:37

## 2020-04-10 RX ADMIN — ATORVASTATIN CALCIUM 40 MILLIGRAM(S): 80 TABLET, FILM COATED ORAL at 21:30

## 2020-04-10 RX ADMIN — AMLODIPINE BESYLATE 10 MILLIGRAM(S): 2.5 TABLET ORAL at 04:16

## 2020-04-10 NOTE — PROGRESS NOTE ADULT - ATTENDING COMMENTS
Respiratory failure improving with decreasing O2 requirement.  Acute phase reactants improving too  Still hyperglycemic - no longer DKA; Titrate Insulins

## 2020-04-10 NOTE — PROGRESS NOTE ADULT - ASSESSMENT
73 y/o M w/ PMHx Afib (Xarelto), T2DM, HTN, HLD, Anemia, TIA, CVA who presented to ED due to SOB for 2 weeks and fever of 102F. He has also been having diarrhea for 2 weeks on/off. He was admitted from 03/28 to 03/30 for pneumonia and was tested positive for COVID during that admission. He did not have any hypoxia during that time and did not require supplemental oxygen. He was treated with Azithro and Rocephin during that hospitalization.      COVID 19 + infection  Viral pneumonia  Hypoxia  Leukocytosis on steroids    - improved  - COVID 19 PCR= +  - CXR= diffuse b/l infiltrates  - CTa negative for PE + GGO   - QTc= 444 ms  - Procalcitonin= not reliable in the setting of renal failure  - Inflammatory markers= improved ferritin and CRP  - Baseline NLR calculation for risk stratification=   10-->24      (NLR <3 low vs >5 high)  - Data regarding treatment of COVID 19 is rapidly evolving. Optimized supportive care remains the mainstay of therapy  - Limited data to support Hydroxychloroquine +/- Azithromycin, will need to monitor for  QT prolongation,  hepatotoxicity, hypoglycemia when using these agents  - Hydroxychloroquine  400 mg po q12h x 1 day then 200 mg po q12h x 4 days   - Monitor QTc  - Check CBC with diff, CMP with LFT's, Inflammatory markers (ESR, CRP, Ferritin, LDH,  procalcitonin)  q48h.  D dimer, lactate, troponin, CK, PT/PTT x 1  - Steroids may be considered in ARDS as per Surviving Sepsis COVID guidelines-limit to short course due to DKA  - May consider vitamin C, thiamine and zinc - note lack of evidence to support benefit with COVID 19  - Encourage self proning q2h and ambulation as tolerated  - Taper off O2 as tolerated- once tolerating room air would ideally monitor for 24h prior to discharge. Continue self quarantine at home x 14 days from date of positive COVID 19 PCR  - Inpatient contact/airborne isolation  - Taper steroids and complete plaquenil      Signing off.

## 2020-04-10 NOTE — DIETITIAN INITIAL EVALUATION ADULT. - ETIOLOGY
related to hypoxic respiratory failure due to COVID-19 infection, acute inflammation and diabetes related to poorly managed diabetes exacerbated by hypoxic respiratory failure due to COVID-19 infection

## 2020-04-10 NOTE — PROGRESS NOTE ADULT - ASSESSMENT
73 y/o M w/ PMHx Afib (Xarelto), T2DM, HTN, HLD, Anemia, TIA, CVA who presented to ED due to acute hypoxic respiratory distress likely secondary to COVID pneumonia versus superimposed bacterial pneumonia     Hypoxic Respiratory Failure, Secondary to Pneumonia, viral  was worsening requiring NRB, then 50% venti, now on 35% venti mask  Patient states he feels significantly better  will try to wean O2 down as tolerated  COVID19 positive  Inflammatory markers trending down  CXR: b/l pneumonia  CTA Chest negative for PE   c/w Plaquenil protocol  c/w Solumedrol 1mg/kg BID  c/w Vitamin C 500mg TID  c/w Thiamine 200mg QD  c/w albuterol prn  c/w tessalon perles prn for cough  Started on IV antibiotics by ID  Low threshold for ventilation if progressive worsening    Atrial fibrillation   converted to NSR   rate controlled  Recently stopped Coreg, stated it was too strong for him  CHADS2 score: 5  HAS-BLED: 4  c/w ASA  c/w rivaroxaban for anticoagulation    HOSSEIN  2/2 to steroid induced DKA/dehydration  creatinine= 1.18  labs pending for  AM  off IVF - PO fluids encouraged  monitor I/O     Anion Gap Diabetic Ketoacidosis   Anion gap closed - 16  HCO3 improved 19 from 17  POCT  - 340   HbA1c: 11.9% (4/6/20)  continue ISS w/ premeal and lantus   Increase lantus to 20   increase premeal to 7  change frequency from q4h to ACHS  Encourage PO fluid intake  monitor electrolytes replete as necessary      Hypertension  Close blood pressure monitoring  c/w Losartan 100 QD    Hx of CVA  no new neurological symptoms  c/w statin    Prophylactic Measure  Adequately anticoagulated with Xarelto    Prognosis - guarded  Code Status: full code  Disposition - Pending clinical course    Emergency contact: Jessica (wife) [phone] 75 y/o M w/ PMHx Afib (Xarelto), T2DM, HTN, HLD, Anemia, TIA, CVA who presented to ED due to acute hypoxic respiratory distress likely secondary to COVID pneumonia versus superimposed bacterial pneumonia     Hypoxic Respiratory Failure, Secondary to Pneumonia, viral  was worsening requiring NRB, then 50% venti, now on 35% venti mask  Patient states he feels significantly better  will try to wean O2 down as tolerated  COVID19 positive  Inflammatory markers trending down  CXR: b/l pneumonia  CTA Chest negative for PE   c/w Plaquenil protocol  c/w Solumedrol 1mg/kg BID  c/w Vitamin C 500mg TID  c/w Thiamine 200mg QD  c/w albuterol prn  c/w tessalon perles prn for cough  Started on IV antibiotics by ID  Low threshold for ventilation if progressive worsening    Atrial fibrillation   converted to NSR   rate controlled  Recently stopped Coreg, stated it was too strong for him  CHADS2 score: 5  HAS-BLED: 4  c/w ASA  c/w rivaroxaban for anticoagulation    HOSSEIN  2/2 to steroid induced DKA/dehydration  creatinine= 1.18  off IVF - PO fluids encouraged  monitor I/O   Hypomagnesemia-- replaced  labs for  AM    Anion Gap Diabetic Ketoacidosis   Anion gap closed - 16  HCO3 improved 19 from 17  POCT  - 340   HbA1c: 11.9% (4/6/20)  continue ISS w/ premeal and lantus   Increase lantus to 20   increase premeal to 7  change frequency from q4h to ACHS  Encourage PO fluid intake  monitor electrolytes replete as necessary      Hypertension  Close blood pressure monitoring  c/w Losartan 100 QD    Hx of CVA  no new neurological symptoms  c/w statin    Prophylactic Measure  Adequately anticoagulated with Xarelto    Prognosis - guarded  Code Status: full code  Disposition - Pending clinical course    Emergency contact: Jessica (wife) [phone] 73 y/o M w/ PMHx Afib (Xarelto), T2DM, HTN, HLD, Anemia, TIA, CVA who presented to ED due to acute hypoxic respiratory distress likely secondary to COVID pneumonia versus superimposed bacterial pneumonia     Hypoxic Respiratory Failure, Secondary to Pneumonia, viral  was worsening requiring NRB, then 50% venti, now on 35% venti mask  Patient states he feels significantly better  will try to wean O2 down as tolerated  pt. weaned down to 3L O2 sats -- 93%  COVID19 positive  Inflammatory markers trending down  CXR: b/l pneumonia  CTA Chest negative for PE   c/w Plaquenil protocol  QTc= 444 ms  c/w Solumedrol 1mg/kg BID  c/w Vitamin C 500mg TID  c/w Thiamine 200mg QD  c/w albuterol prn  c/w tessalon perles prn for cough  Started on IV antibiotics by ID  Low threshold for ventilation if progressive worsening  ID following  Taper off O2 as tolerated- once tolerating room air would ideally monitor for 24h prior to discharge.    Atrial fibrillation   converted to NSR   rate controlled  Recently stopped Coreg, stated it was too strong for him  CHADS2 score: 5  HAS-BLED: 4  c/w ASA  c/w rivaroxaban for anticoagulation    HOSSEIN  2/2 to steroid induced DKA/dehydration  creatinine= 1.18  off IVF - PO fluids encouraged  monitor I/O   Hypomagnesemia-- replaced  labs for  AM    Anion Gap Diabetic Ketoacidosis   Anion gap closed - 16  HCO3 improved 21 from 17  POCT   HbA1c: 11.9% (4/6/20)  continue ISS w/ premeal and lantus   Increase lantus to 20   increase premeal to 7  change frequency from q4h to ACHS  Encourage PO fluid intake  monitor electrolytes replete as necessary      Hypertension  Close blood pressure monitoring  c/w Losartan 100 QD    Hx of CVA  no new neurological symptoms  c/w statin    Prophylactic Measure  Adequately anticoagulated with Xarelto    Prognosis - guarded  Code Status: full code  Disposition - Pending clinical course    Emergency contact: Jessica (wife) [phone]

## 2020-04-10 NOTE — PROVIDER CONTACT NOTE (CRITICAL VALUE NOTIFICATION) - SITUATION
What Type Of Note Output Would You Prefer (Optional)?: Bullet Format How Severe Is Your Skin Lesion?: mild Has Your Skin Lesion Been Treated?: not been treated Is This A New Presentation, Or A Follow-Up?: Moles DUSTY Espinal made aware of lactate 2.6. As per orders no intervention at this time. Will continue to monitor.

## 2020-04-10 NOTE — DIETITIAN INITIAL EVALUATION ADULT. - PERTINENT LABORATORY DATA
04-10 Na141 mmol/L Glu 264 mg/dL<H> K+ 3.9 mmol/L Cr  1.18 mg/dL BUN 34.0 mg/dL<H> Phos 2.6 mg/dL Alb 2.9 g/dL<L> CRP 1.96- trending down,  hgba1c 7.6

## 2020-04-10 NOTE — PROGRESS NOTE ADULT - SUBJECTIVE AND OBJECTIVE BOX
Patient is a 74y old  Male who presents with a chief complaint of Dyspnea (09 Apr 2020 14:45)      HPI:  73 y/o M w/ PMHx Afib (Xarelto), T2DM, HTN, HLD, Anemia, TIA, CVA who presented to ED due to SOB for 2 weeks and fever of 102F. He has also been having diarrhea for 2 weeks on/off. He was admitted from 03/28 to 03/30 for pneumonia and was tested positive for COVID during that admission. He did not have any hypoxia during that time and did not require supplemental oxygen. He was treated with Azithro and Rocephin during that hospitalization.  Patient denies HA, dizziness, LOC, CP, cough, Abd pain, N/V, dysuria. (06 Apr 2020 16:56)    FAMILY HISTORY:  No pertinent family history in first degree relatives      INTERVAL HPI/OVERNIGHT EVENTS:  Pt. is seen and examined. case d/w attending.  Pt. states he feels comfortable, in NAD, denies pain, chills/fever, denies dyspnea, CP, palpitations, abd.pain, N/V  VM35% -- O2sat 95%  Prone position encouraged     PAST MEDICAL & SURGICAL HISTORY:  [place] (hard of hearing)  BPH (benign prostatic hyperplasia)  Diabetes  Atrial fibrillation: paroxysmal on xarelto  CVA (cerebral vascular accident)  Hyperlipemia  History of hypertension  History of loop recorder  S/P appendectomy      Allergies    No Known Allergies    Intolerances        Vital Signs Last 24 Hrs  T(C): 36.8 (10 Apr 2020 09:53), Max: 36.8 (10 Apr 2020 08:08)  T(F): 98.3 (10 Apr 2020 09:53), Max: 98.3 (10 Apr 2020 09:53)  HR: 73 (10 Apr 2020 09:53) (63 - 104)  BP: 151/73 (10 Apr 2020 09:53) (148/73 - 164/73)  BP(mean): --  RR: 18 (10 Apr 2020 09:53) (16 - 20)  SpO2: 97% (10 Apr 2020 09:53) (92% - 97%)                          11.4   21.12 )-----------( 272      ( 10 Apr 2020 01:41 )             33.6     LIVER FUNCTIONS - ( 10 Apr 2020 01:41 )  Alb: 2.9 g/dL / Pro: 6.3 g/dL / ALK PHOS: 100 U/L / ALT: 61 U/L / AST: 39 U/L / GGT: x           RADIOLOGY & ADDITIONAL STUDIES:    MEDICATIONS:  ALBUTerol    90 MICROgram(s) HFA Inhaler 1 Puff(s) Inhalation every 4 hours PRN  amLODIPine   Tablet 10 milliGRAM(s) Oral daily  ascorbic acid 500 milliGRAM(s) Oral three times a day  aspirin  chewable 81 milliGRAM(s) Oral daily  atorvastatin 40 milliGRAM(s) Oral at bedtime  benzonatate 100 milliGRAM(s) Oral three times a day PRN  dextrose 40% Gel 15 Gram(s) Oral once PRN  dextrose 5%. 1000 milliLiter(s) IV Continuous <Continuous>  dextrose 50% Injectable 12.5 Gram(s) IV Push once  dextrose 50% Injectable 25 Gram(s) IV Push once  dextrose 50% Injectable 25 Gram(s) IV Push once  glucagon  Injectable 1 milliGRAM(s) IntraMuscular once PRN  glucagon  Injectable 1 milliGRAM(s) IntraMuscular once PRN  hydroxychloroquine   Oral   hydroxychloroquine 200 milliGRAM(s) Oral every 12 hours  insulin glargine Injectable (LANTUS) 27 Unit(s) SubCutaneous at bedtime  insulin lispro (HumaLOG) corrective regimen sliding scale   SubCutaneous three times a day before meals  insulin lispro (HumaLOG) corrective regimen sliding scale   SubCutaneous at bedtime  insulin lispro Injectable (HumaLOG) 9 Unit(s) SubCutaneous three times a day before meals  losartan 100 milliGRAM(s) Oral daily  methylPREDNISolone sodium succinate Injectable 90 milliGRAM(s) IV Push two times a day  rivaroxaban 20 milliGRAM(s) Oral with dinner  thiamine 100 milliGRAM(s) Oral daily

## 2020-04-10 NOTE — DIETITIAN INITIAL EVALUATION ADULT. - OTHER INFO
Afib (Xarelto), T2DM, HTN, HLD, Anemia, TIA, CVA who presented to ED due to SOB for 2 weeks and fever of 102F. He has also been having diarrhea for 2 weeks on/off. He was admitted from 03/28 to 03/30 for pneumonia and was tested positive for COVID during that admission.  Pt currently weaning down on venti mask and inflammatory markers improving.  Pt with good po intake at meals per RN flowsheets.

## 2020-04-10 NOTE — PROGRESS NOTE ADULT - SUBJECTIVE AND OBJECTIVE BOX
[facility]  INFECTIOUS DISEASES AND INTERNAL MEDICINE at [place]  =======================================================  Jerry Sommer MD  Diplomates American Board of Internal Medicine and Infectious Diseases  Tel: [phone]      Fax: [phone]  =======================================================    [facility]AR[facility] [id number]    Follow up: covid 19    Allergies:  No Known Allergies      Medications:  ALBUTerol    90 MICROgram(s) HFA Inhaler 1 Puff(s) Inhalation every 4 hours PRN  amLODIPine   Tablet 10 milliGRAM(s) Oral daily  ascorbic acid 500 milliGRAM(s) Oral three times a day  aspirin  chewable 81 milliGRAM(s) Oral daily  atorvastatin 40 milliGRAM(s) Oral at bedtime  benzonatate 100 milliGRAM(s) Oral three times a day PRN  dextrose 40% Gel 15 Gram(s) Oral once PRN  dextrose 5%. 1000 milliLiter(s) IV Continuous <Continuous>  dextrose 50% Injectable 12.5 Gram(s) IV Push once  dextrose 50% Injectable 25 Gram(s) IV Push once  dextrose 50% Injectable 25 Gram(s) IV Push once  glucagon  Injectable 1 milliGRAM(s) IntraMuscular once PRN  glucagon  Injectable 1 milliGRAM(s) IntraMuscular once PRN  hydroxychloroquine   Oral   hydroxychloroquine 200 milliGRAM(s) Oral every 12 hours  insulin glargine Injectable (LANTUS) 27 Unit(s) SubCutaneous at bedtime  insulin lispro (HumaLOG) corrective regimen sliding scale   SubCutaneous three times a day before meals  insulin lispro (HumaLOG) corrective regimen sliding scale   SubCutaneous at bedtime  insulin lispro Injectable (HumaLOG) 9 Unit(s) SubCutaneous three times a day before meals  losartan 100 milliGRAM(s) Oral daily  methylPREDNISolone sodium succinate Injectable 90 milliGRAM(s) IV Push two times a day  rivaroxaban 20 milliGRAM(s) Oral with dinner  thiamine 100 milliGRAM(s) Oral daily            REVIEW OF SYSTEMS:  CONSTITUTIONAL:  No Fever or chills  HEENT:   No diplopia or blurred vision.  No earache, sore throat or runny nose.  CARDIOVASCULAR:  No pressure, squeezing, strangling, tightness, heaviness or aching about the chest, neck, axilla or epigastrium.  RESPIRATORY:  No cough, shortness of breath  GASTROINTESTINAL:  No nausea, vomiting or diarrhea.  GENITOURINARY:  No dysuria, frequency or urgency. No Blood in urine  MUSCULOSKELETAL:  no joint aches, no muscle pain  SKIN:  No change in skin, hair or nails.  NEUROLOGIC:  No Headaches, seizures or weakness.  PSYCHIATRIC:  No disorder of thought or mood.  ENDOCRINE:  No heat or cold intolerance  HEMATOLOGICAL:  No easy bruising or bleeding.            Physical Exam:  ICU Vital Signs Last 24 Hrs  T(C): 36.8 (10 Apr 2020 09:53), Max: 36.8 (10 Apr 2020 08:08)  T(F): 98.3 (10 Apr 2020 09:53), Max: 98.3 (10 Apr 2020 09:53)  HR: 73 (10 Apr 2020 09:53) (63 - 78)  BP: 151/73 (10 Apr 2020 09:53) (148/73 - 163/81)  BP(mean): --  ABP: --  ABP(mean): --  RR: 18 (10 Apr 2020 16:04) (16 - 20)  SpO2: 93% (10 Apr 2020 16:04) (92% - 97%)      GEN: NAD, pleasant on VM  HEENT: normocephalic and atraumatic. EOMI. PERRL.  Anicteric   NECK: Supple.   LUNGS: Clear to auscultation.  HEART: Regular rate and rhythm without murmur.  ABDOMEN: Soft, nontender, and nondistended.  Positive bowel sounds.    : No CVA tenderness  EXTREMITIES: Without any edema.  MSK: no joint swelling  NEUROLOGIC: Cranial nerves II through XII are grossly intact. No focal deficits  PSYCHIATRIC: Appropriate affect .  SKIN: No Rash      Labs:    Procalcitonin, Serum: 0.06 (04-10)    C-Reactive Protein, Serum: 1.96 (04-10)  C-Reactive Protein, Serum: 3.04 (04-09)  C-Reactive Protein, Serum: 6.89 (04-08)  C-Reactive Protein, Serum: 9.68 (04-07)  C-Reactive Protein, Serum: 15.84 (04-06)    Ferritin, Serum: 1349 (04-10)  Ferritin, Serum: 1887 (04-09)  Ferritin, Serum: 2121 (04-08)  Ferritin, Serum: 2291 (04-07)  Ferritin, Serum: 2168 (04-06)      D-Dimer Assay, Quantitative: 7450 (04-10)  D-Dimer Assay, Quantitative: 95381 (04-08)  D-Dimer Assay, Quantitative: 06964 (04-07)  D-Dimer Assay, Quantitative: 47954 (04-06)                          11.4   21.12 )-----------( 272      ( 10 Apr 2020 01:41 )             33.6   04-10    141  |  104  |  34.0<H>  ----------------------------<  264<H>  3.9   |  21.0<L>  |  1.18    Ca    8.6      10 Apr 2020 01:41  Phos  2.6     04-10  Mg     1.5     04-10    TPro  6.3<L>  /  Alb  2.9<L>  /  TBili  0.2<L>  /  DBili  x   /  AST  39  /  ALT  61<H>  /  AlkPhos  100  04-10

## 2020-04-11 LAB
ALBUMIN SERPL ELPH-MCNC: 2.8 G/DL — LOW (ref 3.3–5.2)
ALP SERPL-CCNC: 82 U/L — SIGNIFICANT CHANGE UP (ref 40–120)
ALT FLD-CCNC: 54 U/L — HIGH
ANION GAP SERPL CALC-SCNC: 15 MMOL/L — SIGNIFICANT CHANGE UP (ref 5–17)
AST SERPL-CCNC: 24 U/L — SIGNIFICANT CHANGE UP
BASOPHILS # BLD AUTO: 0.04 K/UL — SIGNIFICANT CHANGE UP (ref 0–0.2)
BASOPHILS NFR BLD AUTO: 0.2 % — SIGNIFICANT CHANGE UP (ref 0–2)
BILIRUB SERPL-MCNC: 0.3 MG/DL — LOW (ref 0.4–2)
BUN SERPL-MCNC: 34 MG/DL — HIGH (ref 8–20)
CALCIUM SERPL-MCNC: 8.6 MG/DL — SIGNIFICANT CHANGE UP (ref 8.6–10.2)
CHLORIDE SERPL-SCNC: 103 MMOL/L — SIGNIFICANT CHANGE UP (ref 98–107)
CO2 SERPL-SCNC: 21 MMOL/L — LOW (ref 22–29)
CREAT SERPL-MCNC: 1.08 MG/DL — SIGNIFICANT CHANGE UP (ref 0.5–1.3)
CRP SERPL-MCNC: 1.04 MG/DL — HIGH (ref 0–0.4)
EOSINOPHIL # BLD AUTO: 0 K/UL — SIGNIFICANT CHANGE UP (ref 0–0.5)
EOSINOPHIL NFR BLD AUTO: 0 % — SIGNIFICANT CHANGE UP (ref 0–6)
FERRITIN SERPL-MCNC: 1285 NG/ML — HIGH (ref 30–400)
GLUCOSE BLDC GLUCOMTR-MCNC: 221 MG/DL — HIGH (ref 70–99)
GLUCOSE BLDC GLUCOMTR-MCNC: 282 MG/DL — HIGH (ref 70–99)
GLUCOSE BLDC GLUCOMTR-MCNC: 325 MG/DL — HIGH (ref 70–99)
GLUCOSE BLDC GLUCOMTR-MCNC: 340 MG/DL — HIGH (ref 70–99)
GLUCOSE SERPL-MCNC: 264 MG/DL — HIGH (ref 70–99)
HCT VFR BLD CALC: 35.9 % — LOW (ref 39–50)
HGB BLD-MCNC: 12.2 G/DL — LOW (ref 13–17)
IMM GRANULOCYTES NFR BLD AUTO: 3.3 % — HIGH (ref 0–1.5)
LDH SERPL L TO P-CCNC: 377 U/L — HIGH (ref 98–192)
LYMPHOCYTES # BLD AUTO: 1.25 K/UL — SIGNIFICANT CHANGE UP (ref 1–3.3)
LYMPHOCYTES # BLD AUTO: 5.4 % — LOW (ref 13–44)
MAGNESIUM SERPL-MCNC: 1.7 MG/DL — SIGNIFICANT CHANGE UP (ref 1.6–2.6)
MCHC RBC-ENTMCNC: 28.8 PG — SIGNIFICANT CHANGE UP (ref 27–34)
MCHC RBC-ENTMCNC: 34 GM/DL — SIGNIFICANT CHANGE UP (ref 32–36)
MCV RBC AUTO: 84.7 FL — SIGNIFICANT CHANGE UP (ref 80–100)
MONOCYTES # BLD AUTO: 0.64 K/UL — SIGNIFICANT CHANGE UP (ref 0–0.9)
MONOCYTES NFR BLD AUTO: 2.8 % — SIGNIFICANT CHANGE UP (ref 2–14)
NEUTROPHILS # BLD AUTO: 20.57 K/UL — HIGH (ref 1.8–7.4)
NEUTROPHILS NFR BLD AUTO: 88.3 % — HIGH (ref 43–77)
PHOSPHATE SERPL-MCNC: 2.9 MG/DL — SIGNIFICANT CHANGE UP (ref 2.4–4.7)
PLATELET # BLD AUTO: 310 K/UL — SIGNIFICANT CHANGE UP (ref 150–400)
POTASSIUM SERPL-MCNC: 4.5 MMOL/L — SIGNIFICANT CHANGE UP (ref 3.5–5.3)
POTASSIUM SERPL-SCNC: 4.5 MMOL/L — SIGNIFICANT CHANGE UP (ref 3.5–5.3)
PROT SERPL-MCNC: 6.1 G/DL — LOW (ref 6.6–8.7)
RBC # BLD: 4.24 M/UL — SIGNIFICANT CHANGE UP (ref 4.2–5.8)
RBC # FLD: 13.6 % — SIGNIFICANT CHANGE UP (ref 10.3–14.5)
SODIUM SERPL-SCNC: 139 MMOL/L — SIGNIFICANT CHANGE UP (ref 135–145)
WBC # BLD: 23.27 K/UL — HIGH (ref 3.8–10.5)
WBC # FLD AUTO: 23.27 K/UL — HIGH (ref 3.8–10.5)

## 2020-04-11 PROCEDURE — 99232 SBSQ HOSP IP/OBS MODERATE 35: CPT

## 2020-04-11 RX ORDER — INSULIN LISPRO 100/ML
12 VIAL (ML) SUBCUTANEOUS
Refills: 0 | Status: DISCONTINUED | OUTPATIENT
Start: 2020-04-11 | End: 2020-04-11

## 2020-04-11 RX ORDER — INSULIN LISPRO 100/ML
10 VIAL (ML) SUBCUTANEOUS
Refills: 0 | Status: DISCONTINUED | OUTPATIENT
Start: 2020-04-11 | End: 2020-04-12

## 2020-04-11 RX ORDER — HYDRALAZINE HCL 50 MG
25 TABLET ORAL EVERY 8 HOURS
Refills: 0 | Status: DISCONTINUED | OUTPATIENT
Start: 2020-04-11 | End: 2020-04-17

## 2020-04-11 RX ORDER — INSULIN GLARGINE 100 [IU]/ML
30 INJECTION, SOLUTION SUBCUTANEOUS AT BEDTIME
Refills: 0 | Status: DISCONTINUED | OUTPATIENT
Start: 2020-04-11 | End: 2020-04-12

## 2020-04-11 RX ORDER — INSULIN GLARGINE 100 [IU]/ML
35 INJECTION, SOLUTION SUBCUTANEOUS AT BEDTIME
Refills: 0 | Status: DISCONTINUED | OUTPATIENT
Start: 2020-04-11 | End: 2020-04-11

## 2020-04-11 RX ADMIN — Medication 25 MILLIGRAM(S): at 21:06

## 2020-04-11 RX ADMIN — Medication 6: at 17:12

## 2020-04-11 RX ADMIN — LOSARTAN POTASSIUM 100 MILLIGRAM(S): 100 TABLET, FILM COATED ORAL at 04:15

## 2020-04-11 RX ADMIN — Medication 12 UNIT(S): at 17:12

## 2020-04-11 RX ADMIN — Medication 600 MILLIGRAM(S): at 17:13

## 2020-04-11 RX ADMIN — RIVAROXABAN 20 MILLIGRAM(S): KIT at 17:13

## 2020-04-11 RX ADMIN — Medication 9: at 09:17

## 2020-04-11 RX ADMIN — Medication 500 MILLIGRAM(S): at 04:15

## 2020-04-11 RX ADMIN — Medication 25 MILLIGRAM(S): at 15:32

## 2020-04-11 RX ADMIN — Medication 12: at 12:43

## 2020-04-11 RX ADMIN — Medication 90 MILLIGRAM(S): at 04:15

## 2020-04-11 RX ADMIN — Medication 81 MILLIGRAM(S): at 08:27

## 2020-04-11 RX ADMIN — Medication 500 MILLIGRAM(S): at 21:05

## 2020-04-11 RX ADMIN — Medication 200 MILLIGRAM(S): at 04:15

## 2020-04-11 RX ADMIN — ATORVASTATIN CALCIUM 40 MILLIGRAM(S): 80 TABLET, FILM COATED ORAL at 21:06

## 2020-04-11 RX ADMIN — Medication 6: at 21:06

## 2020-04-11 RX ADMIN — Medication 9 UNIT(S): at 09:17

## 2020-04-11 RX ADMIN — AMLODIPINE BESYLATE 10 MILLIGRAM(S): 2.5 TABLET ORAL at 04:15

## 2020-04-11 RX ADMIN — Medication 100 MILLIGRAM(S): at 08:28

## 2020-04-11 RX ADMIN — INSULIN GLARGINE 30 UNIT(S): 100 INJECTION, SOLUTION SUBCUTANEOUS at 21:33

## 2020-04-11 RX ADMIN — Medication 500 MILLIGRAM(S): at 12:45

## 2020-04-11 NOTE — PROGRESS NOTE ADULT - ASSESSMENT
75 y/o M w/ PMHx Afib (Xarelto), T2DM, HTN, HLD, Anemia, TIA, CVA who presented to ED due to acute hypoxic respiratory distress likely secondary to COVID pneumonia versus superimposed bacterial pneumonia     Hypoxic Respiratory Failure, Secondary to Pneumonia, viral  was worsening requiring NRB, then 50% venti, now on 3L NC  Patient states he feels significantly better  will try to wean O2 down as tolerated  pt. weaned down to 3L O2 sats -- 93%  COVID19 positive  Inflammatory markers trending down  CXR: b/l pneumonia  CTA Chest negative for PE   c/w Plaquenil protocol  QTc= 444 ms  c/w Solumedrol 1mg/kg BID  c/w Vitamin C 500mg TID  c/w Thiamine 200mg QD  c/w albuterol prn  c/w tessalon perles prn for cough  will add Mucinex  Started on IV antibiotics by ID  Low threshold for ventilation if progressive worsening  ID following  Taper off O2 as tolerated- once tolerating room air would ideally monitor for 24h prior to discharge.    Atrial fibrillation   converted to NSR   rate controlled  Recently stopped Coreg, stated it was too strong for him  CHADS2 score: 5  HAS-BLED: 4  c/w ASA  c/w rivaroxaban for anticoagulation    HOSSEIN  2/2 to steroid induced DKA/dehydration  creatinine= 1.08  off IVF - PO fluids encouraged  monitor I/O   Hypomagnesemia-- replaced Mg now at 1.7  labs for  AM    Anion Gap Diabetic Ketoacidosis   Anion gap closed - 16  HCO3 improved 21 from 17  POCT   HbA1c: 11.9% (4/6/20)  continue ISS w/ premeal and lantus   c/w lantus to 20   c/w premeal to 7  frequency from q4h to ACHS  Encourage PO fluid intake  monitor electrolytes replete as necessary    Hypertension  Close blood pressure monitoring  c/w Losartan 100 QD    Hx of CVA  no new neurological symptoms  c/w statin    Prophylactic Measure  Adequately anticoagulated with Xarelto    Prognosis - guarded  Code Status: full code  Disposition - Pending clinical course    Emergency contact: Jessica (wife) [phone] 75 y/o M w/ PMHx Afib (Xarelto), T2DM, HTN, HLD, Anemia, TIA, CVA who presented to ED due to acute hypoxic respiratory distress likely secondary to COVID pneumonia versus superimposed bacterial pneumonia     Hypoxic Respiratory Failure, Secondary to Pneumonia, viral  was worsening requiring NRB, then 50% venti, now on 3L NC  Patient states he feels significantly better  will try to wean O2 down as tolerated  pt. weaned down to 3L O2 sats -- 93%  COVID19 positive  Inflammatory markers trending down  CXR: b/l pneumonia  CTA Chest negative for PE   c/w Plaquenil protocol  QTc= 444 ms  c/w Solumedrol 1mg/kg BID  c/w Vitamin C 500mg TID  c/w Thiamine 200mg QD  c/w albuterol prn  c/w tessalon perles prn for cough  will add Mucinex  Started on IV antibiotics by ID  Low threshold for ventilation if progressive worsening  ID following  Taper off O2 as tolerated- once tolerating room air would ideally monitor for 24h prior to discharge.    Atrial fibrillation   converted to NSR   rate controlled  Recently stopped Coreg, stated it was too strong for him  CHADS2 score: 5  HAS-BLED: 4  c/w ASA  c/w rivaroxaban for anticoagulation    HOSSEIN  2/2 to steroid induced DKA/dehydration  creatinine= 1.08  off IVF - PO fluids encouraged  monitor I/O   Hypomagnesemia-- replaced Mg now at 1.7  labs for  AM    Anion Gap Diabetic Ketoacidosis   Anion gap closed - 16  HCO3 improved 21 from 17  POCT   HbA1c: 11.9% (4/6/20)  continue ISS w/ premeal and lantus   c/w lantus to 20   c/w premeal to 7  frequency from q4h to ACHS  Encourage PO fluid intake  monitor electrolytes replete as necessary    Hypertension  Close blood pressure monitoring  c/w Losartan 100 QD    Hx of CVA  no new neurological symptoms  c/w statin    Prophylactic Measure  Adequately anticoagulated with Xarelto    Prognosis - guarded  Code Status: full code  Disposition - Pending clinical course    Emergency contact: Jessica (wife) [phone]  Discussed at length pt's condition, treatment, and plan. All questions answered. 73 y/o M w/ PMHx Afib (Xarelto), T2DM, HTN, HLD, Anemia, TIA, CVA who presented to ED due to acute hypoxic respiratory distress likely secondary to COVID pneumonia versus superimposed bacterial pneumonia     Hypoxic Respiratory Failure, Secondary to Pneumonia, viral  was worsening requiring NRB, then 50% venti, now on 3L NC  wean O2 down as tolerated  pt. weaned down to 3L O2 sats -- 93%  COVID19 positive  Inflammatory markers trending down  CXR: b/l pneumonia  CTA Chest negative for PE   Completed Plaquenil and Solumedrol  QTc= 444 ms  c/w Vitamin C 500mg three times a day, Thiamine 200mg QD  c/w albuterol prn  c/w tessalon perles prn for cough  Initially started on IV antibiotics by ID - discontinued        Taper off O2 as tolerated- once tolerating room air would ideally monitor for 24h prior to discharge.    Atrial fibrillation   converted to NSR   rate controlled  Recently stopped Coreg, stated it was too strong for him  CHADS2 score: 5  HAS-BLED: 4  c/w ASA  c/w rivaroxaban for anticoagulation    HOSSEIN  2/2 to steroid induced DKA/dehydration  creatinine= 1.08  off IVF - PO fluids encouraged  monitor I/O   Hypomagnesemia-- replaced Mg now at 1.7  labs for  AM    Anion Gap Diabetic Ketoacidosis   Anion gap closed - 16  HCO3 improved 21 from 17  POCT   HbA1c: 11.9% (4/6/20)  continue ISS w/ premeal and lantus   c/w lantus to 30  c/w premeal 12  frequency from q4h to ACHS  Encourage PO fluid intake  monitor electrolytes replete as necessary    Hypertension  Close blood pressure monitoring  c/w Losartan 100 QD    Hx of CVA  no new neurological symptoms  c/w statin    Prophylactic Measure  Adequately anticoagulated with Xarelto    Prognosis - guarded  Code Status: full code  Disposition - Pending clinical course    Emergency contact: Jessica (wife) [phone]  Discussed at length pt's condition, treatment, and plan. All questions answered.

## 2020-04-11 NOTE — PROGRESS NOTE ADULT - ATTENDING COMMENTS
Hypoxia finally improving - down to NC  Completed 5 days Plaquenil and Solumedrol  Inflammatory markers improving.   Still with worsening hyperglycemia -  Insulins increased; monitor closely and downtitrate as steroid discontinued.

## 2020-04-11 NOTE — PROGRESS NOTE ADULT - SUBJECTIVE AND OBJECTIVE BOX
Spoke with georgina and she verbalized understanding   Patient is a 74y old  Male who presents with a chief complaint of Dyspnea (10 Apr 2020 16:05)    HPI:  73 y/o M w/ PMHx Afib (Xarelto), T2DM, HTN, HLD, Anemia, TIA, CVA who presented to ED due to SOB for 2 weeks and fever of 102F. He has also been having diarrhea for 2 weeks on/off. He was admitted from 03/28 to 03/30 for pneumonia and was tested positive for COVID during that admission. He did not have any hypoxia during that time and did not require supplemental oxygen. He was treated with Azithro and Rocephin during that hospitalization.  Patient denies HA, dizziness, LOC, CP, cough, Abd pain, N/V, dysuria. (06 Apr 2020 16:56)    FAMILY HISTORY:  No pertinent family history in first degree relatives    INTERVAL HPI/OVERNIGHT EVENTS:  Pt. is seen and examined. Case d/w attending.  Pt. is very hard of hearing, Right hearing aid is in, but pt, states its not working well.  Pt. states he feels better, , no SOB, but has tough time to bring phlegm up  denies pain, chills/fever, denies dyspnea, CP, palpitations, abd.pain, N/V  O2sat 95%  Prone position encouraged        PAST MEDICAL & SURGICAL HISTORY:  [place] (hard of hearing)  BPH (benign prostatic hyperplasia)  Diabetes  Atrial fibrillation: paroxysmal on xarelto  CVA (cerebral vascular accident)  Hyperlipemia  History of hypertension  History of loop recorder  S/P appendectomy      Allergies    No Known Allergies    Intolerances        Vital Signs Last 24 Hrs  T(C): 36.6 (11 Apr 2020 08:28), Max: 36.6 (11 Apr 2020 04:14)  T(F): 97.8 (11 Apr 2020 08:28), Max: 97.8 (11 Apr 2020 04:14)  HR: 72 (11 Apr 2020 08:28) (62 - 72)  BP: 164/76 (11 Apr 2020 08:28) (154/76 - 164/76)  BP(mean): --  RR: 20 (11 Apr 2020 08:28) (18 - 20)  SpO2: 90% (11 Apr 2020 08:28) (90% - 93%)                                12.2   23.27 )-----------( 310      ( 11 Apr 2020 05:59 )             35.9     LIVER FUNCTIONS - ( 11 Apr 2020 05:59 )  Alb: 2.8 g/dL / Pro: 6.1 g/dL / ALK PHOS: 82 U/L / ALT: 54 U/L / AST: 24 U/L / GGT: x                 RADIOLOGY & ADDITIONAL STUDIES:    MEDICATIONS:  ALBUTerol    90 MICROgram(s) HFA Inhaler 1 Puff(s) Inhalation every 4 hours PRN  amLODIPine   Tablet 10 milliGRAM(s) Oral daily  ascorbic acid 500 milliGRAM(s) Oral three times a day  aspirin  chewable 81 milliGRAM(s) Oral daily  atorvastatin 40 milliGRAM(s) Oral at bedtime  benzonatate 100 milliGRAM(s) Oral three times a day PRN  dextrose 40% Gel 15 Gram(s) Oral once PRN  dextrose 5%. 1000 milliLiter(s) IV Continuous <Continuous>  dextrose 50% Injectable 12.5 Gram(s) IV Push once  dextrose 50% Injectable 25 Gram(s) IV Push once  dextrose 50% Injectable 25 Gram(s) IV Push once  glucagon  Injectable 1 milliGRAM(s) IntraMuscular once PRN  glucagon  Injectable 1 milliGRAM(s) IntraMuscular once PRN  hydrALAZINE 25 milliGRAM(s) Oral every 8 hours  insulin glargine Injectable (LANTUS) 30 Unit(s) SubCutaneous at bedtime  insulin lispro (HumaLOG) corrective regimen sliding scale   SubCutaneous three times a day before meals  insulin lispro (HumaLOG) corrective regimen sliding scale   SubCutaneous at bedtime  insulin lispro Injectable (HumaLOG) 12 Unit(s) SubCutaneous three times a day before meals  losartan 100 milliGRAM(s) Oral daily  rivaroxaban 20 milliGRAM(s) Oral with dinner  thiamine 100 milliGRAM(s) Oral daily Patient is a 74y old  Male who presents with a chief complaint of Dyspnea (10 Apr 2020 16:05)    HPI:  73 y/o M w/ PMHx Afib (Xarelto), T2DM, HTN, HLD, Anemia, TIA, CVA who presented to ED due to SOB for 2 weeks and fever of 102F. He has also been having diarrhea for 2 weeks on/off. He was admitted from 03/28 to 03/30 for pneumonia and was tested positive for COVID during that admission. He did not have any hypoxia during that time and did not require supplemental oxygen. He was treated with Azithro and Rocephin during that hospitalization.  Patient denies HA, dizziness, LOC, CP, cough, Abd pain, N/V, dysuria. (06 Apr 2020 16:56)    FAMILY HISTORY:  No pertinent family history in first degree relatives    INTERVAL HPI/OVERNIGHT EVENTS:  Pt. is seen and examined. Case d/w attending.  Pt. is very hard of hearing, Right hearing aid is in, but pt, states its not working well.  Pt. states he feels better, , no SOB, but has tough time to bring phlegm up  denies pain, chills/fever, denies dyspnea, CP, palpitations, abd.pain, N/V  O2sat 95%  Prone position encouraged  Updated Pt's emergency contact -- his wife Jessica  [phone].   Discussed at length pt's condition, treatment, and plan. All questions answered.      PAST MEDICAL & SURGICAL HISTORY:  [place] (hard of hearing)  BPH (benign prostatic hyperplasia)  Diabetes  Atrial fibrillation: paroxysmal on xarelto  CVA (cerebral vascular accident)  Hyperlipemia  History of hypertension  History of loop recorder  S/P appendectomy      Allergies    No Known Allergies    Intolerances        Vital Signs Last 24 Hrs  T(C): 36.6 (11 Apr 2020 08:28), Max: 36.6 (11 Apr 2020 04:14)  T(F): 97.8 (11 Apr 2020 08:28), Max: 97.8 (11 Apr 2020 04:14)  HR: 72 (11 Apr 2020 08:28) (62 - 72)  BP: 164/76 (11 Apr 2020 08:28) (154/76 - 164/76)  BP(mean): --  RR: 20 (11 Apr 2020 08:28) (18 - 20)  SpO2: 90% (11 Apr 2020 08:28) (90% - 93%)                                12.2   23.27 )-----------( 310      ( 11 Apr 2020 05:59 )             35.9     LIVER FUNCTIONS - ( 11 Apr 2020 05:59 )  Alb: 2.8 g/dL / Pro: 6.1 g/dL / ALK PHOS: 82 U/L / ALT: 54 U/L / AST: 24 U/L / GGT: x                 RADIOLOGY & ADDITIONAL STUDIES:    MEDICATIONS:  ALBUTerol    90 MICROgram(s) HFA Inhaler 1 Puff(s) Inhalation every 4 hours PRN  amLODIPine   Tablet 10 milliGRAM(s) Oral daily  ascorbic acid 500 milliGRAM(s) Oral three times a day  aspirin  chewable 81 milliGRAM(s) Oral daily  atorvastatin 40 milliGRAM(s) Oral at bedtime  benzonatate 100 milliGRAM(s) Oral three times a day PRN  dextrose 40% Gel 15 Gram(s) Oral once PRN  dextrose 5%. 1000 milliLiter(s) IV Continuous <Continuous>  dextrose 50% Injectable 12.5 Gram(s) IV Push once  dextrose 50% Injectable 25 Gram(s) IV Push once  dextrose 50% Injectable 25 Gram(s) IV Push once  glucagon  Injectable 1 milliGRAM(s) IntraMuscular once PRN  glucagon  Injectable 1 milliGRAM(s) IntraMuscular once PRN  hydrALAZINE 25 milliGRAM(s) Oral every 8 hours  insulin glargine Injectable (LANTUS) 30 Unit(s) SubCutaneous at bedtime  insulin lispro (HumaLOG) corrective regimen sliding scale   SubCutaneous three times a day before meals  insulin lispro (HumaLOG) corrective regimen sliding scale   SubCutaneous at bedtime  insulin lispro Injectable (HumaLOG) 12 Unit(s) SubCutaneous three times a day before meals  losartan 100 milliGRAM(s) Oral daily  rivaroxaban 20 milliGRAM(s) Oral with dinner  thiamine 100 milliGRAM(s) Oral daily Patient is a 74y old  Male who presents with a chief complaint of Dyspnea (10 Apr 2020 16:05)    HPI:  75 y/o M w/ PMHx Afib (Xarelto), T2DM, HTN, HLD, Anemia, TIA, CVA who presented to ED due to SOB for 2 weeks and fever of 102F. He has also been having diarrhea for 2 weeks on/off. He was admitted from 03/28 to 03/30 for pneumonia and was tested positive for COVID during that admission. He did not have any hypoxia during that time and did not require supplemental oxygen. He was treated with Azithro and Rocephin during that hospitalization.  Patient denies HA, dizziness, LOC, CP, cough, Abd pain, N/V, dysuria. (06 Apr 2020 16:56)    FAMILY HISTORY:  No pertinent family history in first degree relatives    INTERVAL HPI/OVERNIGHT EVENTS:  Pt. is seen and examined. Case d/w attending.  Pt. is very hard of hearing, Right hearing aid is in, but pt, states its not working well.  Pt. states he feels better, , no SOB, but has tough time to bring phlegm up  denies pain, chills/fever, denies dyspnea, CP, palpitations, abd.pain, N/V  O2sat 95%  Prone position encouraged  Updated Pt's emergency contact -- his wife Jessica  [phone].   Discussed at length pt's condition, treatment, and plan. All questions answered.      PAST MEDICAL & SURGICAL HISTORY:  [place] (hard of hearing)  BPH (benign prostatic hyperplasia)  Diabetes  Atrial fibrillation: paroxysmal on xarelto  CVA (cerebral vascular accident)  Hyperlipemia  History of hypertension  History of loop recorder  S/P appendectomy      Allergies    No Known Allergies    Intolerances        Vital Signs Last 24 Hrs  T(C): 36.6 (11 Apr 2020 08:28), Max: 36.6 (11 Apr 2020 04:14)  T(F): 97.8 (11 Apr 2020 08:28), Max: 97.8 (11 Apr 2020 04:14)  HR: 72 (11 Apr 2020 08:28) (62 - 72)  BP: 164/76 (11 Apr 2020 08:28) (154/76 - 164/76)RR: 20 (11 Apr 2020 08:28) (18 - 20)  SpO2: 90% (11 Apr 2020 08:28) (90% - 93%)                                12.2   23.27 )-----------( 310      ( 11 Apr 2020 05:59 )             35.9     LIVER FUNCTIONS - ( 11 Apr 2020 05:59 )  Alb: 2.8 g/dL / Pro: 6.1 g/dL / ALK PHOS: 82 U/L / ALT: 54 U/L / AST: 24 U/L / GGT: x                 RADIOLOGY & ADDITIONAL STUDIES:    MEDICATIONS:  ALBUTerol    90 MICROgram(s) HFA Inhaler 1 Puff(s) Inhalation every 4 hours PRN  amLODIPine   Tablet 10 milliGRAM(s) Oral daily  ascorbic acid 500 milliGRAM(s) Oral three times a day  aspirin  chewable 81 milliGRAM(s) Oral daily  atorvastatin 40 milliGRAM(s) Oral at bedtime  benzonatate 100 milliGRAM(s) Oral three times a day PRN  dextrose 40% Gel 15 Gram(s) Oral once PRN  dextrose 5%. 1000 milliLiter(s) IV Continuous <Continuous>  dextrose 50% Injectable 12.5 Gram(s) IV Push once  dextrose 50% Injectable 25 Gram(s) IV Push once  dextrose 50% Injectable 25 Gram(s) IV Push once  glucagon  Injectable 1 milliGRAM(s) IntraMuscular once PRN  glucagon  Injectable 1 milliGRAM(s) IntraMuscular once PRN  hydrALAZINE 25 milliGRAM(s) Oral every 8 hours  insulin glargine Injectable (LANTUS) 30 Unit(s) SubCutaneous at bedtime  insulin lispro (HumaLOG) corrective regimen sliding scale   SubCutaneous three times a day before meals  insulin lispro (HumaLOG) corrective regimen sliding scale   SubCutaneous at bedtime  insulin lispro Injectable (HumaLOG) 12 Unit(s) SubCutaneous three times a day before meals  losartan 100 milliGRAM(s) Oral daily  rivaroxaban 20 milliGRAM(s) Oral with dinner  thiamine 100 milliGRAM(s) Oral daily

## 2020-04-12 LAB
ALBUMIN SERPL ELPH-MCNC: 2.8 G/DL — LOW (ref 3.3–5.2)
ALP SERPL-CCNC: 69 U/L — SIGNIFICANT CHANGE UP (ref 40–120)
ALT FLD-CCNC: 45 U/L — HIGH
ANION GAP SERPL CALC-SCNC: 14 MMOL/L — SIGNIFICANT CHANGE UP (ref 5–17)
AST SERPL-CCNC: 17 U/L — SIGNIFICANT CHANGE UP
BASOPHILS # BLD AUTO: 0.02 K/UL — SIGNIFICANT CHANGE UP (ref 0–0.2)
BASOPHILS NFR BLD AUTO: 0.1 % — SIGNIFICANT CHANGE UP (ref 0–2)
BILIRUB SERPL-MCNC: 0.4 MG/DL — SIGNIFICANT CHANGE UP (ref 0.4–2)
BUN SERPL-MCNC: 33 MG/DL — HIGH (ref 8–20)
CALCIUM SERPL-MCNC: 8.4 MG/DL — LOW (ref 8.6–10.2)
CHLORIDE SERPL-SCNC: 105 MMOL/L — SIGNIFICANT CHANGE UP (ref 98–107)
CO2 SERPL-SCNC: 24 MMOL/L — SIGNIFICANT CHANGE UP (ref 22–29)
CREAT SERPL-MCNC: 0.99 MG/DL — SIGNIFICANT CHANGE UP (ref 0.5–1.3)
CRP SERPL-MCNC: 0.65 MG/DL — HIGH (ref 0–0.4)
CULTURE RESULTS: SIGNIFICANT CHANGE UP
EOSINOPHIL # BLD AUTO: 0 K/UL — SIGNIFICANT CHANGE UP (ref 0–0.5)
EOSINOPHIL NFR BLD AUTO: 0 % — SIGNIFICANT CHANGE UP (ref 0–6)
FERRITIN SERPL-MCNC: 1050 NG/ML — HIGH (ref 30–400)
GLUCOSE BLDC GLUCOMTR-MCNC: 103 MG/DL — HIGH (ref 70–99)
GLUCOSE BLDC GLUCOMTR-MCNC: 134 MG/DL — HIGH (ref 70–99)
GLUCOSE BLDC GLUCOMTR-MCNC: 134 MG/DL — HIGH (ref 70–99)
GLUCOSE SERPL-MCNC: 143 MG/DL — HIGH (ref 70–99)
HCT VFR BLD CALC: 36.8 % — LOW (ref 39–50)
HGB BLD-MCNC: 12.6 G/DL — LOW (ref 13–17)
IMM GRANULOCYTES NFR BLD AUTO: 3.8 % — HIGH (ref 0–1.5)
LDH SERPL L TO P-CCNC: 284 U/L — HIGH (ref 98–192)
LYMPHOCYTES # BLD AUTO: 12.1 % — LOW (ref 13–44)
LYMPHOCYTES # BLD AUTO: 2.31 K/UL — SIGNIFICANT CHANGE UP (ref 1–3.3)
MAGNESIUM SERPL-MCNC: 1.5 MG/DL — LOW (ref 1.8–2.6)
MCHC RBC-ENTMCNC: 29.2 PG — SIGNIFICANT CHANGE UP (ref 27–34)
MCHC RBC-ENTMCNC: 34.2 GM/DL — SIGNIFICANT CHANGE UP (ref 32–36)
MCV RBC AUTO: 85.4 FL — SIGNIFICANT CHANGE UP (ref 80–100)
MONOCYTES # BLD AUTO: 0.97 K/UL — HIGH (ref 0–0.9)
MONOCYTES NFR BLD AUTO: 5.1 % — SIGNIFICANT CHANGE UP (ref 2–14)
NEUTROPHILS # BLD AUTO: 15.04 K/UL — HIGH (ref 1.8–7.4)
NEUTROPHILS NFR BLD AUTO: 78.9 % — HIGH (ref 43–77)
PHOSPHATE SERPL-MCNC: 2.8 MG/DL — SIGNIFICANT CHANGE UP (ref 2.4–4.7)
PLATELET # BLD AUTO: 320 K/UL — SIGNIFICANT CHANGE UP (ref 150–400)
POTASSIUM SERPL-MCNC: 3.7 MMOL/L — SIGNIFICANT CHANGE UP (ref 3.5–5.3)
POTASSIUM SERPL-SCNC: 3.7 MMOL/L — SIGNIFICANT CHANGE UP (ref 3.5–5.3)
PROT SERPL-MCNC: 5.4 G/DL — LOW (ref 6.6–8.7)
RBC # BLD: 4.31 M/UL — SIGNIFICANT CHANGE UP (ref 4.2–5.8)
RBC # FLD: 13.9 % — SIGNIFICANT CHANGE UP (ref 10.3–14.5)
SODIUM SERPL-SCNC: 143 MMOL/L — SIGNIFICANT CHANGE UP (ref 135–145)
SPECIMEN SOURCE: SIGNIFICANT CHANGE UP
WBC # BLD: 19.06 K/UL — HIGH (ref 3.8–10.5)
WBC # FLD AUTO: 19.06 K/UL — HIGH (ref 3.8–10.5)

## 2020-04-12 PROCEDURE — 99232 SBSQ HOSP IP/OBS MODERATE 35: CPT

## 2020-04-12 RX ORDER — INSULIN GLARGINE 100 [IU]/ML
20 INJECTION, SOLUTION SUBCUTANEOUS AT BEDTIME
Refills: 0 | Status: DISCONTINUED | OUTPATIENT
Start: 2020-04-12 | End: 2020-04-17

## 2020-04-12 RX ORDER — MAGNESIUM SULFATE 500 MG/ML
2 VIAL (ML) INJECTION ONCE
Refills: 0 | Status: COMPLETED | OUTPATIENT
Start: 2020-04-12 | End: 2020-04-12

## 2020-04-12 RX ORDER — INSULIN LISPRO 100/ML
7 VIAL (ML) SUBCUTANEOUS
Refills: 0 | Status: DISCONTINUED | OUTPATIENT
Start: 2020-04-12 | End: 2020-04-17

## 2020-04-12 RX ADMIN — Medication 7 UNIT(S): at 17:24

## 2020-04-12 RX ADMIN — Medication 100 MILLIGRAM(S): at 12:16

## 2020-04-12 RX ADMIN — Medication 600 MILLIGRAM(S): at 05:14

## 2020-04-12 RX ADMIN — Medication 25 MILLIGRAM(S): at 05:14

## 2020-04-12 RX ADMIN — LOSARTAN POTASSIUM 100 MILLIGRAM(S): 100 TABLET, FILM COATED ORAL at 05:14

## 2020-04-12 RX ADMIN — INSULIN GLARGINE 20 UNIT(S): 100 INJECTION, SOLUTION SUBCUTANEOUS at 22:53

## 2020-04-12 RX ADMIN — Medication 600 MILLIGRAM(S): at 17:25

## 2020-04-12 RX ADMIN — RIVAROXABAN 20 MILLIGRAM(S): KIT at 17:25

## 2020-04-12 RX ADMIN — Medication 81 MILLIGRAM(S): at 12:17

## 2020-04-12 RX ADMIN — AMLODIPINE BESYLATE 10 MILLIGRAM(S): 2.5 TABLET ORAL at 05:14

## 2020-04-12 RX ADMIN — Medication 25 MILLIGRAM(S): at 22:09

## 2020-04-12 RX ADMIN — Medication 50 GRAM(S): at 12:17

## 2020-04-12 RX ADMIN — Medication 500 MILLIGRAM(S): at 22:09

## 2020-04-12 RX ADMIN — Medication 7 UNIT(S): at 12:15

## 2020-04-12 RX ADMIN — Medication 500 MILLIGRAM(S): at 05:14

## 2020-04-12 RX ADMIN — Medication 3: at 12:14

## 2020-04-12 RX ADMIN — Medication 500 MILLIGRAM(S): at 12:17

## 2020-04-12 RX ADMIN — ATORVASTATIN CALCIUM 40 MILLIGRAM(S): 80 TABLET, FILM COATED ORAL at 22:09

## 2020-04-12 RX ADMIN — Medication 25 MILLIGRAM(S): at 12:17

## 2020-04-12 NOTE — PROGRESS NOTE ADULT - SUBJECTIVE AND OBJECTIVE BOX
Patient is a 74y old  Male who presents with a chief complaint of Dyspnea (11 Apr 2020 13:15)      HPI:  75 y/o M w/ PMHx Afib (Xarelto), T2DM, HTN, HLD, Anemia, TIA, CVA who presented to ED due to SOB for 2 weeks and fever of 102F. He has also been having diarrhea for 2 weeks on/off. He was admitted from 03/28 to 03/30 for pneumonia and was tested positive for COVID during that admission. He did not have any hypoxia during that time and did not require supplemental oxygen. He was treated with Azithro and Rocephin during that hospitalization.  Patient denies HA, dizziness, LOC, CP, cough, Abd pain, N/V, dysuria. (06 Apr 2020 16:56)    FAMILY HISTORY:  No pertinent family history in first degree relatives      INTERVAL HPI/OVERNIGHT EVENTS:  Pt. is seen and examined. Case d/w attending.  Pt. is very hard of hearing, Right hearing aid is in, but pt, states its not working well.  Pt. states he feels better, , no SOB, denies pain, chills/fever, denies dyspnea, CP, palpitations, abd.pain, N/V  O2sat 94% on NC 3L - will try to wean down  Pt. can't do proning - encouraged. Tries to lay on his Left side.  Inflammatory markers improving.   PT EVALUATION      PAST MEDICAL & SURGICAL HISTORY:  [place] (hard of hearing)  BPH (benign prostatic hyperplasia)  Diabetes  Atrial fibrillation: paroxysmal on xarelto  CVA (cerebral vascular accident)  Hyperlipemia  History of hypertension  History of loop recorder  S/P appendectomy      Allergies    No Known Allergies    Intolerances    Vital Signs Last 24 Hrs  T(C): 36.5 (12 Apr 2020 12:06), Max: 36.7 (12 Apr 2020 05:13)  T(F): 97.7 (12 Apr 2020 12:06), Max: 98 (12 Apr 2020 05:13)  HR: 63 (12 Apr 2020 12:06) (53 - 77)  BP: 139/73 (12 Apr 2020 12:06) (134/77 - 146/76)  BP(mean): --  RR: 20 (12 Apr 2020 12:06) (20 - 24)  SpO2: 91% (12 Apr 2020 12:06) (91% - 96%)                            12.6   19.06 )-----------( 320      ( 12 Apr 2020 05:11 )             36.8     LIVER FUNCTIONS - ( 12 Apr 2020 05:11 )  Alb: 2.8 g/dL / Pro: 5.4 g/dL / ALK PHOS: 69 U/L / ALT: 45 U/L / AST: 17 U/L / GGT: x           RADIOLOGY & ADDITIONAL STUDIES:    MEDICATIONS:  ALBUTerol    90 MICROgram(s) HFA Inhaler 1 Puff(s) Inhalation every 4 hours PRN  amLODIPine   Tablet 10 milliGRAM(s) Oral daily  ascorbic acid 500 milliGRAM(s) Oral three times a day  aspirin  chewable 81 milliGRAM(s) Oral daily  atorvastatin 40 milliGRAM(s) Oral at bedtime  benzonatate 100 milliGRAM(s) Oral three times a day PRN  dextrose 40% Gel 15 Gram(s) Oral once PRN  dextrose 5%. 1000 milliLiter(s) IV Continuous <Continuous>  dextrose 50% Injectable 12.5 Gram(s) IV Push once  dextrose 50% Injectable 25 Gram(s) IV Push once  dextrose 50% Injectable 25 Gram(s) IV Push once  glucagon  Injectable 1 milliGRAM(s) IntraMuscular once PRN  glucagon  Injectable 1 milliGRAM(s) IntraMuscular once PRN  guaiFENesin  milliGRAM(s) Oral every 12 hours  hydrALAZINE 25 milliGRAM(s) Oral every 8 hours  insulin glargine Injectable (LANTUS) 20 Unit(s) SubCutaneous at bedtime  insulin lispro (HumaLOG) corrective regimen sliding scale   SubCutaneous three times a day before meals  insulin lispro (HumaLOG) corrective regimen sliding scale   SubCutaneous at bedtime  insulin lispro Injectable (HumaLOG) 7 Unit(s) SubCutaneous three times a day with meals  losartan 100 milliGRAM(s) Oral daily  rivaroxaban 20 milliGRAM(s) Oral with dinner  thiamine 100 milliGRAM(s) Oral daily Patient is a 74y old  Male who presents with a chief complaint of Dyspnea (11 Apr 2020 13:15)      HPI:  75 y/o M w/ PMHx Afib (Xarelto), T2DM, HTN, HLD, Anemia, TIA, CVA who presented to ED due to SOB for 2 weeks and fever of 102F. He has also been having diarrhea for 2 weeks on/off. He was admitted from 03/28 to 03/30 for pneumonia and was tested positive for COVID during that admission. He did not have any hypoxia during that time and did not require supplemental oxygen. He was treated with Azithro and Rocephin during that hospitalization.  Patient denies HA, dizziness, LOC, CP, cough, Abd pain, N/V, dysuria. (06 Apr 2020 16:56)    FAMILY HISTORY:  No pertinent family history in first degree relatives      INTERVAL HPI/OVERNIGHT EVENTS:  Pt. is seen and examined. Case d/w attending.  Pt. is very hard of hearing, Right hearing aid is in, but pt, states its not working well.  Pt. states he feels better, , no SOB, denies pain, chills/fever, denies dyspnea, CP, palpitations, abd.pain, N/V  Hypomagnesemia- replete  O2sat 94% on NC 3L - will try to wean down  Pt. can't do proning - encouraged. Tries to lay on his Left side.  Inflammatory markers improving.   PT EVALUATION      PAST MEDICAL & SURGICAL HISTORY:  [place] (hard of hearing)  BPH (benign prostatic hyperplasia)  Diabetes  Atrial fibrillation: paroxysmal on xarelto  CVA (cerebral vascular accident)  Hyperlipemia  History of hypertension  History of loop recorder  S/P appendectomy      Allergies    No Known Allergies    Intolerances    Vital Signs Last 24 Hrs  T(C): 36.5 (12 Apr 2020 12:06), Max: 36.7 (12 Apr 2020 05:13)  T(F): 97.7 (12 Apr 2020 12:06), Max: 98 (12 Apr 2020 05:13)  HR: 63 (12 Apr 2020 12:06) (53 - 77)  BP: 139/73 (12 Apr 2020 12:06) (134/77 - 146/76)  BP(mean): --  RR: 20 (12 Apr 2020 12:06) (20 - 24)  SpO2: 91% (12 Apr 2020 12:06) (91% - 96%)                            12.6   19.06 )-----------( 320      ( 12 Apr 2020 05:11 )             36.8     LIVER FUNCTIONS - ( 12 Apr 2020 05:11 )  Alb: 2.8 g/dL / Pro: 5.4 g/dL / ALK PHOS: 69 U/L / ALT: 45 U/L / AST: 17 U/L / GGT: x           RADIOLOGY & ADDITIONAL STUDIES:    MEDICATIONS:  ALBUTerol    90 MICROgram(s) HFA Inhaler 1 Puff(s) Inhalation every 4 hours PRN  amLODIPine   Tablet 10 milliGRAM(s) Oral daily  ascorbic acid 500 milliGRAM(s) Oral three times a day  aspirin  chewable 81 milliGRAM(s) Oral daily  atorvastatin 40 milliGRAM(s) Oral at bedtime  benzonatate 100 milliGRAM(s) Oral three times a day PRN  dextrose 40% Gel 15 Gram(s) Oral once PRN  dextrose 5%. 1000 milliLiter(s) IV Continuous <Continuous>  dextrose 50% Injectable 12.5 Gram(s) IV Push once  dextrose 50% Injectable 25 Gram(s) IV Push once  dextrose 50% Injectable 25 Gram(s) IV Push once  glucagon  Injectable 1 milliGRAM(s) IntraMuscular once PRN  glucagon  Injectable 1 milliGRAM(s) IntraMuscular once PRN  guaiFENesin  milliGRAM(s) Oral every 12 hours  hydrALAZINE 25 milliGRAM(s) Oral every 8 hours  insulin glargine Injectable (LANTUS) 20 Unit(s) SubCutaneous at bedtime  insulin lispro (HumaLOG) corrective regimen sliding scale   SubCutaneous three times a day before meals  insulin lispro (HumaLOG) corrective regimen sliding scale   SubCutaneous at bedtime  insulin lispro Injectable (HumaLOG) 7 Unit(s) SubCutaneous three times a day with meals  losartan 100 milliGRAM(s) Oral daily  rivaroxaban 20 milliGRAM(s) Oral with dinner  thiamine 100 milliGRAM(s) Oral daily

## 2020-04-12 NOTE — PROGRESS NOTE ADULT - ATTENDING COMMENTS
Acute hypoxic respiratory failure continues to improve - maintaining on nasal cannula  COVID 19 Pneumonia - improving inflammatory markers, hypoxia  T2DM with Hyperglycemia secondary to steroids - decrease Insulins to prevent hypoglycemia since no longer on steroids  AF rate controlled, on Xarelto  HTN - controlled    Discussed with patient and facetimed family and discussed with wife and son.  Anticipate home soon if continues to improve - may need home O2

## 2020-04-12 NOTE — PROGRESS NOTE ADULT - ASSESSMENT
73 y/o M w/ PMHx Afib (Xarelto), T2DM, HTN, HLD, Anemia, TIA, CVA who presented to ED due to acute hypoxic respiratory distress likely secondary to COVID pneumonia versus superimposed bacterial pneumonia     Hypoxic Respiratory Failure, Secondary to Pneumonia, viral  was worsening requiring NRB, then 50% venti, now on 3L NC  wean O2 down as tolerated  pt. down to 3L O2 sats -- 94%  COVID19 positive  Inflammatory markers trending down  CXR: b/l pneumonia  CTA Chest negative for PE   Completed Plaquenil and Solumedrol  c/w Vitamin C 500mg three times a day, Thiamine 200mg QD  c/w albuterol prn  c/w tessalon perles prn for cough  Initially started on IV antibiotics by ID - discontinued   Taper off O2 as tolerated- once tolerating room air would ideally monitor for 24h prior to discharge.    Atrial fibrillation   converted to NSR   rate controlled  Recently stopped Coreg, stated it was too strong for him  CHADS2 score: 5  HAS-BLED: 4  c/w ASA  c/w rivaroxaban for anticoagulation    HOSSEIN  2/2 to steroid induced DKA/dehydration  creatinine= 0.99  off IVF - PO fluids encouraged  monitor I/O   Hypomagnesemia-- Mg now at 1.5  labs for  AM    Anion Gap Diabetic Ketoacidosis   Anion gap closed - 14  HCO3 improved 24 from 17  POCT   HbA1c: 11.9% (4/6/20)  continue ISS w/ premeal and lantus   off Solumedrol  c/w lantus to 30  c/w premeal 12  frequency from q4h to ACHS  Encourage PO fluid intake  monitor electrolytes replete as necessary    Hypertension  Close blood pressure monitoring  c/w Losartan 100 QD    Hx of CVA  no new neurological symptoms  c/w statin    Prophylactic Measure  Adequately anticoagulated with Xarelto    Prognosis - guarded  Code Status: full code  Disposition - Pending clinical course and improvement  PT EVALUATION    Emergency contact: Jessica (wife) [phone] 75 y/o M w/ PMHx Afib (Xarelto), T2DM, HTN, HLD, Anemia, TIA, CVA who presented to ED due to acute hypoxic respiratory distress likely secondary to COVID pneumonia versus superimposed bacterial pneumonia     Hypoxic Respiratory Failure, Secondary to Pneumonia, viral  was worsening requiring NRB, then 50% venti, now on 3L NC  wean O2 down as tolerated  pt. down to 3L O2 sats -- 94%  COVID19 positive  Inflammatory markers trending down  CXR: b/l pneumonia  CTA Chest negative for PE   Completed Plaquenil and Solumedrol  c/w Vitamin C 500mg three times a day, Thiamine 200mg QD  c/w albuterol prn  c/w tessalon perles prn for cough  Initially started on IV antibiotics by ID - discontinued   Taper off O2 as tolerated- once tolerating room air would ideally monitor for 24h prior to discharge.    Atrial fibrillation   converted to NSR   rate controlled  Recently stopped Coreg, stated it was too strong for him  CHADS2 score: 5  HAS-BLED: 4  c/w ASA  c/w rivaroxaban for anticoagulation    HOSSEIN  2/2 to steroid induced DKA/dehydration  creatinine= 0.99  off IVF - PO fluids encouraged  monitor I/O   Hypomagnesemia-- Mg now at 1.5- replete  labs for  AM    Anion Gap Diabetic Ketoacidosis   Anion gap closed - 14  HCO3 improved 24 from 17  POCT   HbA1c: 11.9% (4/6/20)  continue ISS w/ premeal and lantus   off Solumedrol  c/w lantus to 30  c/w premeal 12  frequency from q4h to ACHS  Encourage PO fluid intake  monitor electrolytes replete as necessary    Hypertension  Close blood pressure monitoring  c/w Losartan 100 QD    Hx of CVA  no new neurological symptoms  c/w statin    Prophylactic Measure  Adequately anticoagulated with Xarelto    Prognosis - guarded  Code Status: full code  Disposition - Pending clinical course and improvement  PT EVALUATION    Emergency contact: Jessica (wife) [phone]

## 2020-04-13 LAB
ALBUMIN SERPL ELPH-MCNC: 2.9 G/DL — LOW (ref 3.3–5.2)
ALP SERPL-CCNC: 76 U/L — SIGNIFICANT CHANGE UP (ref 40–120)
ALT FLD-CCNC: 46 U/L — HIGH
ANION GAP SERPL CALC-SCNC: 14 MMOL/L — SIGNIFICANT CHANGE UP (ref 5–17)
AST SERPL-CCNC: 26 U/L — SIGNIFICANT CHANGE UP
BASOPHILS # BLD AUTO: 0.03 K/UL — SIGNIFICANT CHANGE UP (ref 0–0.2)
BASOPHILS NFR BLD AUTO: 0.2 % — SIGNIFICANT CHANGE UP (ref 0–2)
BILIRUB SERPL-MCNC: 0.4 MG/DL — SIGNIFICANT CHANGE UP (ref 0.4–2)
BUN SERPL-MCNC: 33 MG/DL — HIGH (ref 8–20)
CALCIUM SERPL-MCNC: 8.4 MG/DL — LOW (ref 8.6–10.2)
CHLORIDE SERPL-SCNC: 103 MMOL/L — SIGNIFICANT CHANGE UP (ref 98–107)
CO2 SERPL-SCNC: 23 MMOL/L — SIGNIFICANT CHANGE UP (ref 22–29)
CREAT SERPL-MCNC: 1.13 MG/DL — SIGNIFICANT CHANGE UP (ref 0.5–1.3)
CRP SERPL-MCNC: 0.52 MG/DL — HIGH (ref 0–0.4)
EOSINOPHIL # BLD AUTO: 0.01 K/UL — SIGNIFICANT CHANGE UP (ref 0–0.5)
EOSINOPHIL NFR BLD AUTO: 0.1 % — SIGNIFICANT CHANGE UP (ref 0–6)
FERRITIN SERPL-MCNC: 1078 NG/ML — HIGH (ref 30–400)
GLUCOSE BLDC GLUCOMTR-MCNC: 108 MG/DL — HIGH (ref 70–99)
GLUCOSE BLDC GLUCOMTR-MCNC: 117 MG/DL — HIGH (ref 70–99)
GLUCOSE BLDC GLUCOMTR-MCNC: 130 MG/DL — HIGH (ref 70–99)
GLUCOSE BLDC GLUCOMTR-MCNC: 167 MG/DL — HIGH (ref 70–99)
GLUCOSE SERPL-MCNC: 181 MG/DL — HIGH (ref 70–99)
HCT VFR BLD CALC: 39.4 % — SIGNIFICANT CHANGE UP (ref 39–50)
HGB BLD-MCNC: 13.2 G/DL — SIGNIFICANT CHANGE UP (ref 13–17)
IMM GRANULOCYTES NFR BLD AUTO: 2.6 % — HIGH (ref 0–1.5)
LDH SERPL L TO P-CCNC: 324 U/L — HIGH (ref 98–192)
LYMPHOCYTES # BLD AUTO: 1.88 K/UL — SIGNIFICANT CHANGE UP (ref 1–3.3)
LYMPHOCYTES # BLD AUTO: 13.3 % — SIGNIFICANT CHANGE UP (ref 13–44)
MAGNESIUM SERPL-MCNC: 1.7 MG/DL — LOW (ref 1.8–2.6)
MCHC RBC-ENTMCNC: 28.6 PG — SIGNIFICANT CHANGE UP (ref 27–34)
MCHC RBC-ENTMCNC: 33.5 GM/DL — SIGNIFICANT CHANGE UP (ref 32–36)
MCV RBC AUTO: 85.5 FL — SIGNIFICANT CHANGE UP (ref 80–100)
MONOCYTES # BLD AUTO: 0.86 K/UL — SIGNIFICANT CHANGE UP (ref 0–0.9)
MONOCYTES NFR BLD AUTO: 6.1 % — SIGNIFICANT CHANGE UP (ref 2–14)
NEUTROPHILS # BLD AUTO: 11.01 K/UL — HIGH (ref 1.8–7.4)
NEUTROPHILS NFR BLD AUTO: 77.7 % — HIGH (ref 43–77)
PHOSPHATE SERPL-MCNC: 3.2 MG/DL — SIGNIFICANT CHANGE UP (ref 2.4–4.7)
PLATELET # BLD AUTO: 317 K/UL — SIGNIFICANT CHANGE UP (ref 150–400)
POTASSIUM SERPL-MCNC: 3.8 MMOL/L — SIGNIFICANT CHANGE UP (ref 3.5–5.3)
POTASSIUM SERPL-SCNC: 3.8 MMOL/L — SIGNIFICANT CHANGE UP (ref 3.5–5.3)
PROT SERPL-MCNC: 5.7 G/DL — LOW (ref 6.6–8.7)
RBC # BLD: 4.61 M/UL — SIGNIFICANT CHANGE UP (ref 4.2–5.8)
RBC # FLD: 14.1 % — SIGNIFICANT CHANGE UP (ref 10.3–14.5)
SODIUM SERPL-SCNC: 140 MMOL/L — SIGNIFICANT CHANGE UP (ref 135–145)
WBC # BLD: 14.16 K/UL — HIGH (ref 3.8–10.5)
WBC # FLD AUTO: 14.16 K/UL — HIGH (ref 3.8–10.5)

## 2020-04-13 PROCEDURE — 99233 SBSQ HOSP IP/OBS HIGH 50: CPT

## 2020-04-13 RX ORDER — MAGNESIUM SULFATE 500 MG/ML
2 VIAL (ML) INJECTION ONCE
Refills: 0 | Status: COMPLETED | OUTPATIENT
Start: 2020-04-13 | End: 2020-04-13

## 2020-04-13 RX ORDER — METOPROLOL TARTRATE 50 MG
25 TABLET ORAL
Refills: 0 | Status: DISCONTINUED | OUTPATIENT
Start: 2020-04-13 | End: 2020-04-17

## 2020-04-13 RX ORDER — POTASSIUM CHLORIDE 20 MEQ
40 PACKET (EA) ORAL ONCE
Refills: 0 | Status: COMPLETED | OUTPATIENT
Start: 2020-04-13 | End: 2020-04-15

## 2020-04-13 RX ADMIN — Medication 7 UNIT(S): at 08:37

## 2020-04-13 RX ADMIN — RIVAROXABAN 20 MILLIGRAM(S): KIT at 16:16

## 2020-04-13 RX ADMIN — Medication 7 UNIT(S): at 16:14

## 2020-04-13 RX ADMIN — LOSARTAN POTASSIUM 100 MILLIGRAM(S): 100 TABLET, FILM COATED ORAL at 05:50

## 2020-04-13 RX ADMIN — Medication 100 MILLIGRAM(S): at 11:56

## 2020-04-13 RX ADMIN — Medication 25 MILLIGRAM(S): at 12:00

## 2020-04-13 RX ADMIN — Medication 600 MILLIGRAM(S): at 05:50

## 2020-04-13 RX ADMIN — Medication 500 MILLIGRAM(S): at 11:56

## 2020-04-13 RX ADMIN — Medication 50 GRAM(S): at 18:00

## 2020-04-13 RX ADMIN — ATORVASTATIN CALCIUM 40 MILLIGRAM(S): 80 TABLET, FILM COATED ORAL at 22:19

## 2020-04-13 RX ADMIN — Medication 500 MILLIGRAM(S): at 22:19

## 2020-04-13 RX ADMIN — Medication 500 MILLIGRAM(S): at 05:50

## 2020-04-13 RX ADMIN — Medication 3: at 16:13

## 2020-04-13 RX ADMIN — Medication 25 MILLIGRAM(S): at 22:28

## 2020-04-13 RX ADMIN — AMLODIPINE BESYLATE 10 MILLIGRAM(S): 2.5 TABLET ORAL at 05:50

## 2020-04-13 RX ADMIN — Medication 100 MILLIGRAM(S): at 22:21

## 2020-04-13 RX ADMIN — Medication 7 UNIT(S): at 13:06

## 2020-04-13 RX ADMIN — INSULIN GLARGINE 20 UNIT(S): 100 INJECTION, SOLUTION SUBCUTANEOUS at 22:20

## 2020-04-13 RX ADMIN — Medication 25 MILLIGRAM(S): at 05:50

## 2020-04-13 RX ADMIN — Medication 81 MILLIGRAM(S): at 11:56

## 2020-04-13 NOTE — PROGRESS NOTE ADULT - ATTENDING COMMENTS
patient seen and eval. agree with above.   patient very [place] , doesnot have hearing aids. communicated with patient by writing on paper.   patient feels better.   no fever / chills.   pt eval pending   offered patient to be transferred to [facility] , patient refused   patient agrees to go to [facility] if needed

## 2020-04-13 NOTE — PROGRESS NOTE ADULT - SUBJECTIVE AND OBJECTIVE BOX
HPI:  75 y/o M w/ PMHx Afib (Xarelto), T2DM, HTN, HLD, Anemia, TIA, CVA who presented to ED due to SOB for 2 weeks and fever of 102F. He has also been having diarrhea for 2 weeks on/off. He was admitted from 03/28 to 03/30 for pneumonia and was tested positive for COVID during that admission. He did not have any hypoxia during that time and did not require supplemental oxygen. He was treated with Azithro and Rocephin during that hospitalization.  Patient denies HA, dizziness, LOC, CP, cough, Abd pain, N/V, dysuria. (06 Apr 2020 16:56)    ROS: All review of systems negative unless indicated otherwise below.                          LAB RESULTS                   COMPLETE BLOOD COUNT( 12 Apr 2020 05:11 )                            12.6 g/dL<L>  19.06 K/uL<H> )---------------( 320 K/uL                        36.8 %<L>      Automated Differential     Auto Basophil # - 0.02 K/uL  Auto Basophil % - 0.1 %  Auto Eosinophil # - 0.00 K/uL  Auto Eosinophil % - 0.0 %  Auto Immature Granulocyte # - X      Auto Immature Granulocyte % - 3.8 %<H>  Auto Lymphocyte # - 2.31 K/uL  Auto Lymphocyte % - 12.1 %<L>  Auto Monocyte # - 0.97 K/uL<H>  Auto Monocyte % - 5.1 %  Auto Neutrophil # - 15.04 K/uL<H>  Auto Neutrophil % - 78.9 %<H>                                  CHEMISTRY                 Basic Metabolic Panel (04-12-20 @ 05:11)    143  |  105  |  33.0<H>  ----------------------------<  143<H>  3.7   |  24.0  |  0.99    Ca    8.4<L>      12 Apr 2020 05:11  Phos  2.8     04-12  Mg     1.5     04-12                    Liver Functions (04-12-20 @ 05:11))  TPro  5.4  /  Alb  2.8  /  TBili  0.4  /  DBili  x   /  AST  17  /  ALT  45  /  AlkPhos  69     PT/INR/PTT ( 28 Mar 2020 06:41 )                        :                       :      24.2         :       47.2                  .        .                   .              .           .       2.09        .                                                             MICROBIOLOGY/CULTURES:    Culture - Blood (collected 04-07-20 @ 20:10)  Source: .Blood  Final Report (04-12-20 @ 21:00):    No growth at 5 days.    Culture - Blood (collected 03-28-20 @ 03:54)  Source: .Blood  Final Report (04-02-20 @ 05:00):    No growth at 5 days.                          RADIOLOGY RESULTS: Personally visualized   < from: Xray Chest 1 View-PORTABLE IMMEDIATE (03.28.20 @ 02:21) >  IMPRESSION:   Subtle asymmetric increased density overlying the RIGHT hilum which may represent an overlapping focal airspace consolidation. Continued surveillance recommended.    < end of copied text >                          CARDIOLOGY REVIEW: Personally visualized and reviewed  Telemetry Last 24h: SR 60s                              INTAKE AND OUTPUT - 48 HOUR TREND     04-11-20 @ 07:01  -  04-12-20 @ 07:00  --------------------------------------------------------  IN:  Total IN: 0 mL    OUT:    Voided: 1300 mL  Total OUT: 1300 mL    Total NET: -1300 mL      04-12-20 @ 07:01  -  04-13-20 @ 07:00  --------------------------------------------------------  IN:  Total IN: 0 mL    OUT:    Voided: 1425 mL  Total OUT: 1425 mL    Total NET: -1425 mL    HOME MEDICATIONS:  amLODIPine 10 mg oral tablet: 1 tab(s) orally once a day (06 Apr 2020 16:23)  aspirin 81 mg oral tablet, chewable: 1 tab(s) orally once a day (06 Apr 2020 16:23)  atorvastatin 40 mg oral tablet: 1 tab(s) orally once a day (06 Apr 2020 16:23)  losartan 100 mg oral tablet: 1 tab(s) orally once a day (06 Apr 2020 16:23)  metFORMIN 500 mg oral tablet: 1 tab(s) orally 2 times a day (06 Apr 2020 16:23)  pioglitazone 45 mg oral tablet: orally once a day (06 Apr 2020 16:23)  Xarelto 20 mg oral tablet: 1 tab(s) orally once a day (in the evening) (06 Apr 2020 16:23)                             Current Admission Active Medications    ALBUTerol    90 MICROgram(s) HFA Inhaler 1 Puff(s) Inhalation every 4 hours PRN Shortness of Breath and/or Wheezing  amLODIPine   Tablet 10 milliGRAM(s) Oral daily  ascorbic acid 500 milliGRAM(s) Oral three times a day  aspirin  chewable 81 milliGRAM(s) Oral daily  atorvastatin 40 milliGRAM(s) Oral at bedtime  benzonatate 100 milliGRAM(s) Oral three times a day PRN Cough  dextrose 40% Gel 15 Gram(s) Oral once PRN Blood Glucose LESS THAN 70 milliGRAM(s)/deciliter  dextrose 5%. 1000 milliLiter(s) (50 mL/Hr) IV Continuous <Continuous>  dextrose 50% Injectable 12.5 Gram(s) IV Push once  dextrose 50% Injectable 25 Gram(s) IV Push once  dextrose 50% Injectable 25 Gram(s) IV Push once  glucagon  Injectable 1 milliGRAM(s) IntraMuscular once PRN Glucose LESS THAN 70 milligrams/deciliter  glucagon  Injectable 1 milliGRAM(s) IntraMuscular once PRN Glucose LESS THAN 70 milligrams/deciliter  hydrALAZINE 25 milliGRAM(s) Oral every 8 hours  insulin glargine Injectable (LANTUS) 20 Unit(s) SubCutaneous at bedtime  insulin lispro (HumaLOG) corrective regimen sliding scale   SubCutaneous three times a day before meals  insulin lispro (HumaLOG) corrective regimen sliding scale   SubCutaneous at bedtime  insulin lispro Injectable (HumaLOG) 7 Unit(s) SubCutaneous three times a day with meals  losartan 100 milliGRAM(s) Oral daily  rivaroxaban 20 milliGRAM(s) Oral with dinner  thiamine 100 milliGRAM(s) Oral daily                        PHYSICAL EXAM:    Vital Signs Last 24 Hrs  T(C): 36.4 (13 Apr 2020 08:54), Max: 36.6 (12 Apr 2020 23:15)  T(F): 97.6 (13 Apr 2020 08:54), Max: 97.8 (12 Apr 2020 23:15)  HR: 59 (13 Apr 2020 08:54) (59 - 65)  BP: 135/73 (13 Apr 2020 08:54) (132/70 - 143/76)  BP(mean): --  RR: 18 (13 Apr 2020 05:30) (18 - 20)  SpO2: 90% (13 Apr 2020 08:54) (90% - 94%)    GENERAL: NAD  NECK: Supple, No JVD  NERVOUS SYSTEM:  Alert & Oriented X3, non focal neuro exam.   CHEST/LUNG: No wheezes, bibasilar crackles, diminished   HEART: Regular rate and rhythm; s1 and s2 auscultated, No murmurs, rubs, or gallops  ABDOMEN: Soft, Nontender, Nondistended; Bowel sounds present and normoactive.   EXTREMITIES:  2+ Peripheral Pulses, No clubbing, cyanosis, or edema cc: covid pna     interval hx: patient seen and eval.  comfortable. [place]   HPI:  75 y/o M w/ PMHx Afib (Xarelto), T2DM, HTN, HLD, Anemia, TIA, CVA who presented to ED due to SOB for 2 weeks and fever of 102F. He has also been having diarrhea for 2 weeks on/off. He was admitted from 03/28 to 03/30 for pneumonia and was tested positive for COVID during that admission. He did not have any hypoxia during that time and did not require supplemental oxygen. He was treated with Azithro and Rocephin during that hospitalization.  Patient denies HA, dizziness, LOC, CP, cough, Abd pain, N/V, dysuria. (06 Apr 2020 16:56)    ROS: All review of systems negative unless indicated otherwise below.                          LAB RESULTS                   COMPLETE BLOOD COUNT( 12 Apr 2020 05:11 )                            12.6 g/dL<L>  19.06 K/uL<H> )---------------( 320 K/uL                        36.8 %<L>      Automated Differential     Auto Basophil # - 0.02 K/uL  Auto Basophil % - 0.1 %  Auto Eosinophil # - 0.00 K/uL  Auto Eosinophil % - 0.0 %  Auto Immature Granulocyte # - X      Auto Immature Granulocyte % - 3.8 %<H>  Auto Lymphocyte # - 2.31 K/uL  Auto Lymphocyte % - 12.1 %<L>  Auto Monocyte # - 0.97 K/uL<H>  Auto Monocyte % - 5.1 %  Auto Neutrophil # - 15.04 K/uL<H>  Auto Neutrophil % - 78.9 %<H>                                  CHEMISTRY                 Basic Metabolic Panel (04-12-20 @ 05:11)    143  |  105  |  33.0<H>  ----------------------------<  143<H>  3.7   |  24.0  |  0.99    Ca    8.4<L>      12 Apr 2020 05:11  Phos  2.8     04-12  Mg     1.5     04-12                    Liver Functions (04-12-20 @ 05:11))  TPro  5.4  /  Alb  2.8  /  TBili  0.4  /  DBili  x   /  AST  17  /  ALT  45  /  AlkPhos  69     PT/INR/PTT ( 28 Mar 2020 06:41 )                        :                       :      24.2         :       47.2                  .        .                   .              .           .       2.09        .                                                             MICROBIOLOGY/CULTURES:    Culture - Blood (collected 04-07-20 @ 20:10)  Source: .Blood  Final Report (04-12-20 @ 21:00):    No growth at 5 days.    Culture - Blood (collected 03-28-20 @ 03:54)  Source: .Blood  Final Report (04-02-20 @ 05:00):    No growth at 5 days.                          RADIOLOGY RESULTS: Personally visualized   < from: Xray Chest 1 View-PORTABLE IMMEDIATE (03.28.20 @ 02:21) >  IMPRESSION:   Subtle asymmetric increased density overlying the RIGHT hilum which may represent an overlapping focal airspace consolidation. Continued surveillance recommended.    < end of copied text >                          CARDIOLOGY REVIEW: Personally visualized and reviewed  Telemetry Last 24h: SR 60s                              INTAKE AND OUTPUT - 48 HOUR TREND     04-11-20 @ 07:01  -  04-12-20 @ 07:00  --------------------------------------------------------  IN:  Total IN: 0 mL    OUT:    Voided: 1300 mL  Total OUT: 1300 mL    Total NET: -1300 mL      04-12-20 @ 07:01  -  04-13-20 @ 07:00  --------------------------------------------------------  IN:  Total IN: 0 mL    OUT:    Voided: 1425 mL  Total OUT: 1425 mL    Total NET: -1425 mL    HOME MEDICATIONS:  amLODIPine 10 mg oral tablet: 1 tab(s) orally once a day (06 Apr 2020 16:23)  aspirin 81 mg oral tablet, chewable: 1 tab(s) orally once a day (06 Apr 2020 16:23)  atorvastatin 40 mg oral tablet: 1 tab(s) orally once a day (06 Apr 2020 16:23)  losartan 100 mg oral tablet: 1 tab(s) orally once a day (06 Apr 2020 16:23)  metFORMIN 500 mg oral tablet: 1 tab(s) orally 2 times a day (06 Apr 2020 16:23)  pioglitazone 45 mg oral tablet: orally once a day (06 Apr 2020 16:23)  Xarelto 20 mg oral tablet: 1 tab(s) orally once a day (in the evening) (06 Apr 2020 16:23)                             Current Admission Active Medications    ALBUTerol    90 MICROgram(s) HFA Inhaler 1 Puff(s) Inhalation every 4 hours PRN Shortness of Breath and/or Wheezing  amLODIPine   Tablet 10 milliGRAM(s) Oral daily  ascorbic acid 500 milliGRAM(s) Oral three times a day  aspirin  chewable 81 milliGRAM(s) Oral daily  atorvastatin 40 milliGRAM(s) Oral at bedtime  benzonatate 100 milliGRAM(s) Oral three times a day PRN Cough  dextrose 40% Gel 15 Gram(s) Oral once PRN Blood Glucose LESS THAN 70 milliGRAM(s)/deciliter  dextrose 5%. 1000 milliLiter(s) (50 mL/Hr) IV Continuous <Continuous>  dextrose 50% Injectable 12.5 Gram(s) IV Push once  dextrose 50% Injectable 25 Gram(s) IV Push once  dextrose 50% Injectable 25 Gram(s) IV Push once  glucagon  Injectable 1 milliGRAM(s) IntraMuscular once PRN Glucose LESS THAN 70 milligrams/deciliter  glucagon  Injectable 1 milliGRAM(s) IntraMuscular once PRN Glucose LESS THAN 70 milligrams/deciliter  hydrALAZINE 25 milliGRAM(s) Oral every 8 hours  insulin glargine Injectable (LANTUS) 20 Unit(s) SubCutaneous at bedtime  insulin lispro (HumaLOG) corrective regimen sliding scale   SubCutaneous three times a day before meals  insulin lispro (HumaLOG) corrective regimen sliding scale   SubCutaneous at bedtime  insulin lispro Injectable (HumaLOG) 7 Unit(s) SubCutaneous three times a day with meals  losartan 100 milliGRAM(s) Oral daily  rivaroxaban 20 milliGRAM(s) Oral with dinner  thiamine 100 milliGRAM(s) Oral daily                        PHYSICAL EXAM:    Vital Signs Last 24 Hrs  T(C): 36.4 (13 Apr 2020 08:54), Max: 36.6 (12 Apr 2020 23:15)  T(F): 97.6 (13 Apr 2020 08:54), Max: 97.8 (12 Apr 2020 23:15)  HR: 59 (13 Apr 2020 08:54) (59 - 65)  BP: 135/73 (13 Apr 2020 08:54) (132/70 - 143/76)  BP(mean): --  RR: 18 (13 Apr 2020 05:30) (18 - 20)  SpO2: 90% (13 Apr 2020 08:54) (90% - 94%)    GENERAL: NAD  NECK: Supple, No JVD  NERVOUS SYSTEM:  Alert & Oriented X3, non focal neuro exam.   CHEST/LUNG: No wheezes, bibasilar crackles, diminished   HEART: Regular rate and rhythm; s1 and s2 auscultated, No murmurs, rubs, or gallops  ABDOMEN: Soft, Nontender, Nondistended; Bowel sounds present and normoactive.   EXTREMITIES:  2+ Peripheral Pulses, No clubbing, cyanosis, or edema

## 2020-04-13 NOTE — PROGRESS NOTE ADULT - ASSESSMENT
73 y/o M w/ PMHx Afib (Xarelto), T2DM, HTN, HLD, Anemia, TIA, CVA who presented to ED due to acute hypoxic respiratory distress likely secondary to COVID pneumonia versus superimposed bacterial pneumonia     Hypoxic Respiratory Failure, Secondary to Pneumonia, viral  was worsening requiring NRB, then 50% venti, now on 2L NC  wean O2 down as tolerated  pt. down to 2L 95%   COVID19 positive  Inflammatory markers trending down  CXR: b/l pneumonia  CTA Chest negative for PE   Completed Plaquenil and Solumedrol  c/w Vitamin C 500mg three times a day, Thiamine 200mg QD  c/w albuterol prn  c/w tessalon perles prn for cough  Initially started on IV antibiotics by ID - discontinued  Taper off O2 as tolerated- once tolerating room air would ideally monitor for 24h prior to discharge.    Atrial fibrillation   converted to NSR   rate controlled  Recently stopped Coreg, stated it was too strong for him  CHADS2 score: 5  HAS-BLED: 4  c/w ASA  c/w rivaroxaban for anticoagulation    HOSSEIN  2/2 to steroid induced DKA/dehydration  creatinine= 0.99  off IVF - PO fluids encouraged  monitor I/O       Anion Gap Diabetic Ketoacidosis   Resolved  HbA1c: 11.9% (4/6/20)  continue ISS w/ premeal and lantus   off Solumedrol    Hypertension  Close blood pressure monitoring  c/w Losartan, hydralazine,     Hx of CVA  no new neurological symptoms  c/w statin    Prophylactic Measure  Adequately anticoagulated with Xarelto    Prognosis - guarded  Code Status: full code  Disposition - Pending clinical course and improvement  PT EVALUATION 73 y/o M w/ PMHx Afib (Xarelto), T2DM, HTN, HLD, Anemia, TIA, CVA who presented to ED due to acute hypoxic respiratory distress likely secondary to COVID pneumonia versus superimposed bacterial pneumonia     acute Hypoxic Respiratory Failure, Secondary to Pneumonia, viral/ covid pna   was worsening requiring NRB, then 50% venti, now on 2L NC  wean O2 down as tolerated  pt. down to 2L 95%   COVID19 positive  Inflammatory markers trending down  CXR: b/l pneumonia  CTA Chest negative for PE   Completed Plaquenil and Solumedrol  c/w Vitamin C 500mg three times a day, Thiamine 200mg QD  c/w albuterol prn  c/w tessalon perles prn for cough  Initially started on IV antibiotics by ID - discontinued  Taper off O2 as tolerated- once tolerating room air would ideally monitor for 24h prior to discharge.    Atrial fibrillation   converted to NSR   rate controlled  Recently stopped Coreg, stated it was too strong for him  CHADS2 score: 5  HAS-BLED: 4  c/w ASA  c/w rivaroxaban for anticoagulation    HOSSEIN  2/2 to steroid induced DKA/dehydration  creatinine= 0.99  off IVF - PO fluids encouraged  monitor I/O       Anion Gap Diabetic Ketoacidosis   Resolved  HbA1c: 11.9% (4/6/20)  continue ISS w/ premeal and lantus   off Solumedrol    Hypertension  Close blood pressure monitoring  c/w Losartan, hydralazine,     Hx of CVA  no new neurological symptoms  c/w statin    Prophylactic Measure  Adequately anticoagulated with Xarelto    Code Status: full code  Disposition - Pending clinical course and improvement  PT EVALUATION

## 2020-04-14 LAB
ALBUMIN SERPL ELPH-MCNC: 3 G/DL — LOW (ref 3.3–5.2)
ALP SERPL-CCNC: 85 U/L — SIGNIFICANT CHANGE UP (ref 40–120)
ALT FLD-CCNC: 44 U/L — HIGH
ANION GAP SERPL CALC-SCNC: 13 MMOL/L — SIGNIFICANT CHANGE UP (ref 5–17)
AST SERPL-CCNC: 24 U/L — SIGNIFICANT CHANGE UP
BILIRUB SERPL-MCNC: 0.5 MG/DL — SIGNIFICANT CHANGE UP (ref 0.4–2)
BUN SERPL-MCNC: 27 MG/DL — HIGH (ref 8–20)
CALCIUM SERPL-MCNC: 8.5 MG/DL — LOW (ref 8.6–10.2)
CHLORIDE SERPL-SCNC: 104 MMOL/L — SIGNIFICANT CHANGE UP (ref 98–107)
CO2 SERPL-SCNC: 24 MMOL/L — SIGNIFICANT CHANGE UP (ref 22–29)
CREAT SERPL-MCNC: 1.11 MG/DL — SIGNIFICANT CHANGE UP (ref 0.5–1.3)
CRP SERPL-MCNC: 0.46 MG/DL — HIGH (ref 0–0.4)
FERRITIN SERPL-MCNC: 2555 NG/ML — HIGH (ref 30–400)
GLUCOSE BLDC GLUCOMTR-MCNC: 153 MG/DL — HIGH (ref 70–99)
GLUCOSE BLDC GLUCOMTR-MCNC: 181 MG/DL — HIGH (ref 70–99)
GLUCOSE BLDC GLUCOMTR-MCNC: 189 MG/DL — HIGH (ref 70–99)
GLUCOSE BLDC GLUCOMTR-MCNC: 203 MG/DL — HIGH (ref 70–99)
GLUCOSE BLDC GLUCOMTR-MCNC: 231 MG/DL — HIGH (ref 70–99)
GLUCOSE SERPL-MCNC: 156 MG/DL — HIGH (ref 70–99)
HCT VFR BLD CALC: 41.7 % — SIGNIFICANT CHANGE UP (ref 39–50)
HGB BLD-MCNC: 13.8 G/DL — SIGNIFICANT CHANGE UP (ref 13–17)
MAGNESIUM SERPL-MCNC: 1.9 MG/DL — SIGNIFICANT CHANGE UP (ref 1.6–2.6)
MCHC RBC-ENTMCNC: 29 PG — SIGNIFICANT CHANGE UP (ref 27–34)
MCHC RBC-ENTMCNC: 33.1 GM/DL — SIGNIFICANT CHANGE UP (ref 32–36)
MCV RBC AUTO: 87.6 FL — SIGNIFICANT CHANGE UP (ref 80–100)
PHOSPHATE SERPL-MCNC: 3.3 MG/DL — SIGNIFICANT CHANGE UP (ref 2.4–4.7)
PLATELET # BLD AUTO: 343 K/UL — SIGNIFICANT CHANGE UP (ref 150–400)
POTASSIUM SERPL-MCNC: 4.4 MMOL/L — SIGNIFICANT CHANGE UP (ref 3.5–5.3)
POTASSIUM SERPL-SCNC: 4.4 MMOL/L — SIGNIFICANT CHANGE UP (ref 3.5–5.3)
PROT SERPL-MCNC: 6.3 G/DL — LOW (ref 6.6–8.7)
RBC # BLD: 4.76 M/UL — SIGNIFICANT CHANGE UP (ref 4.2–5.8)
RBC # FLD: 14.1 % — SIGNIFICANT CHANGE UP (ref 10.3–14.5)
SODIUM SERPL-SCNC: 141 MMOL/L — SIGNIFICANT CHANGE UP (ref 135–145)
WBC # BLD: 18.93 K/UL — HIGH (ref 3.8–10.5)
WBC # FLD AUTO: 18.93 K/UL — HIGH (ref 3.8–10.5)

## 2020-04-14 PROCEDURE — 99232 SBSQ HOSP IP/OBS MODERATE 35: CPT

## 2020-04-14 RX ADMIN — Medication 100 MILLIGRAM(S): at 13:11

## 2020-04-14 RX ADMIN — Medication 25 MILLIGRAM(S): at 05:56

## 2020-04-14 RX ADMIN — ATORVASTATIN CALCIUM 40 MILLIGRAM(S): 80 TABLET, FILM COATED ORAL at 21:45

## 2020-04-14 RX ADMIN — Medication 500 MILLIGRAM(S): at 21:45

## 2020-04-14 RX ADMIN — Medication 81 MILLIGRAM(S): at 13:11

## 2020-04-14 RX ADMIN — Medication 25 MILLIGRAM(S): at 21:45

## 2020-04-14 RX ADMIN — Medication 7 UNIT(S): at 13:13

## 2020-04-14 RX ADMIN — Medication 500 MILLIGRAM(S): at 13:11

## 2020-04-14 RX ADMIN — Medication 25 MILLIGRAM(S): at 13:11

## 2020-04-14 RX ADMIN — LOSARTAN POTASSIUM 100 MILLIGRAM(S): 100 TABLET, FILM COATED ORAL at 05:56

## 2020-04-14 RX ADMIN — AMLODIPINE BESYLATE 10 MILLIGRAM(S): 2.5 TABLET ORAL at 05:56

## 2020-04-14 RX ADMIN — Medication 25 MILLIGRAM(S): at 16:54

## 2020-04-14 RX ADMIN — INSULIN GLARGINE 20 UNIT(S): 100 INJECTION, SOLUTION SUBCUTANEOUS at 21:46

## 2020-04-14 RX ADMIN — Medication 3: at 13:11

## 2020-04-14 RX ADMIN — Medication 7 UNIT(S): at 17:07

## 2020-04-14 RX ADMIN — Medication 500 MILLIGRAM(S): at 05:52

## 2020-04-14 NOTE — PHYSICAL THERAPY INITIAL EVALUATION ADULT - ACTIVE RANGE OF MOTION EXAMINATION, REHAB EVAL
no Active ROM deficits were identified Skin Substitute Paste Text: The defect edges were debeveled with a #15 scalpel blade.  Given the location of the defect, shape of the defect and the proximity to free margins a skin substitute micronized graft was deemed most appropriate.  The entire vial contents were admixed with 0.5ccs of sterile saline, formed into a paste and then evenly spread over the entire wound bed.

## 2020-04-14 NOTE — PROGRESS NOTE ADULT - ATTENDING COMMENTS
patient seen and eval. agree with above.   leukocytosis / possibly reactive due to steroids   no fevers   monitor overnight   possible dc home in am if medically stable

## 2020-04-14 NOTE — PROGRESS NOTE ADULT - RS GEN PE MLT RESP DETAILS PC
diminished breath sounds, L/rhonchi/diminished breath sounds, R
diminished breath sounds, L/diminished breath sounds, R
diminished breath sounds, R/diminished breath sounds, L
diminished breath sounds, R/diminished breath sounds, L/rhonchi

## 2020-04-14 NOTE — PROGRESS NOTE ADULT - CONSTITUTIONAL DETAILS
well-nourished/obese
no distress/obese/well-developed
no distress/well-nourished
obese/well-developed/no distress

## 2020-04-14 NOTE — PROGRESS NOTE ADULT - GASTROINTESTINAL DETAILS
nontender/soft/no distention/bowel sounds normal
soft/nontender/no distention
obese/soft/nontender/no distention
soft/bowel sounds normal/nontender/no distention

## 2020-04-14 NOTE — PHYSICAL THERAPY INITIAL EVALUATION ADULT - GENERAL OBSERVATIONS, REHAB EVAL
Pt received lying in bed on 4 tower, (+) IV lock, (+) cardiac monitor, (+) supplemental O2 @2.0L/min. NAD. Agreeable to PT evaluation.

## 2020-04-14 NOTE — PROGRESS NOTE ADULT - MS EXT PE MLT D E PC
no clubbing/no cyanosis/no pedal edema
no cyanosis/no pedal edema/no clubbing
no clubbing/no pedal edema/no cyanosis
no cyanosis/no clubbing/no pedal edema

## 2020-04-14 NOTE — PROGRESS NOTE ADULT - SUBJECTIVE AND OBJECTIVE BOX
Patient is a 74y old  Male who presents with a chief complaint of Dyspnea (13 Apr 2020 12:02)      HPI:  75 y/o M w/ PMHx Afib (Xarelto), T2DM, HTN, HLD, Anemia, TIA, CVA who presented to ED due to SOB for 2 weeks and fever of 102F. He has also been having diarrhea for 2 weeks on/off. He was admitted from 03/28 to 03/30 for pneumonia and was tested positive for COVID during that admission. He did not have any hypoxia during that time and did not require supplemental oxygen. He was treated with Azithro and Rocephin during that hospitalization.  Patient denies HA, dizziness, LOC, CP, cough, Abd pain, N/V, dysuria. (06 Apr 2020 16:56)    FAMILY HISTORY:  No pertinent family history in first degree relatives      INTERVAL HPI/OVERNIGHT EVENTS:  back on 4L O2 sats -- 95%- desat after PT session      PAST MEDICAL & SURGICAL HISTORY:  [place] (hard of hearing)  BPH (benign prostatic hyperplasia)  Diabetes  Atrial fibrillation: paroxysmal on xarelto  CVA (cerebral vascular accident)  Hyperlipemia  History of hypertension  History of loop recorder  S/P appendectomy      Allergies    No Known Allergies    Intolerances    Vital Signs Last 24 Hrs  T(C): 36.6 (13 Apr 2020 22:29), Max: 36.8 (13 Apr 2020 15:56)  T(F): 97.9 (13 Apr 2020 22:29), Max: 98.2 (13 Apr 2020 15:56)  HR: 56 (14 Apr 2020 05:49) (56 - 71)  BP: 127/76 (14 Apr 2020 05:49) (123/74 - 131/77)  BP(mean): --  RR: 18 (14 Apr 2020 05:49) (18 - 18)  SpO2: 94% (14 Apr 2020 05:49) (94% - 95%)                          13.8   18.93 )-----------( 343      ( 14 Apr 2020 12:18 )             41.7     LIVER FUNCTIONS - ( 14 Apr 2020 12:17 )  Alb: 3.0 g/dL / Pro: 6.3 g/dL / ALK PHOS: 85 U/L / ALT: 44 U/L / AST: 24 U/L / GGT: x           RADIOLOGY & ADDITIONAL STUDIES:    MEDICATIONS:  ALBUTerol    90 MICROgram(s) HFA Inhaler 1 Puff(s) Inhalation every 4 hours PRN  amLODIPine   Tablet 10 milliGRAM(s) Oral daily  ascorbic acid 500 milliGRAM(s) Oral three times a day  aspirin  chewable 81 milliGRAM(s) Oral daily  atorvastatin 40 milliGRAM(s) Oral at bedtime  benzonatate 100 milliGRAM(s) Oral three times a day PRN  dextrose 40% Gel 15 Gram(s) Oral once PRN  dextrose 5%. 1000 milliLiter(s) IV Continuous <Continuous>  dextrose 50% Injectable 12.5 Gram(s) IV Push once  dextrose 50% Injectable 25 Gram(s) IV Push once  dextrose 50% Injectable 25 Gram(s) IV Push once  glucagon  Injectable 1 milliGRAM(s) IntraMuscular once PRN  glucagon  Injectable 1 milliGRAM(s) IntraMuscular once PRN  hydrALAZINE 25 milliGRAM(s) Oral every 8 hours  insulin glargine Injectable (LANTUS) 20 Unit(s) SubCutaneous at bedtime  insulin lispro (HumaLOG) corrective regimen sliding scale   SubCutaneous three times a day before meals  insulin lispro (HumaLOG) corrective regimen sliding scale   SubCutaneous at bedtime  insulin lispro Injectable (HumaLOG) 7 Unit(s) SubCutaneous three times a day with meals  losartan 100 milliGRAM(s) Oral daily  metoprolol tartrate 25 milliGRAM(s) Oral two times a day  potassium chloride    Tablet ER 40 milliEquivalent(s) Oral once  rivaroxaban 20 milliGRAM(s) Oral with dinner  thiamine 100 milliGRAM(s) Oral daily

## 2020-04-14 NOTE — PHYSICAL THERAPY INITIAL EVALUATION ADULT - ADDITIONAL COMMENTS
Pt reports living with his spouse and son in a private house. No steps to enter. Flight to bedroom, but able to reside on main floor, if needed. Independent at baseline, no device.

## 2020-04-14 NOTE — PROGRESS NOTE ADULT - ASSESSMENT
73 y/o M w/ PMHx Afib (Xarelto), T2DM, HTN, HLD, Anemia, TIA, CVA who presented to ED due to acute hypoxic respiratory distress likely secondary to COVID pneumonia versus superimposed bacterial pneumonia     Hypoxic Respiratory Failure, Secondary to Pneumonia, viral  was worsening requiring NRB, then 50% venti, now on 3L NC  wean O2 down as tolerated  pt. now back on 4L O2 sats -- 95%- desat after PT session  COVID19 positive  Inflammatory markers trending down  CXR: b/l pneumonia  CTA Chest negative for PE   Completed Plaquenil and Solumedrol  c/w Vitamin C 500mg three times a day, Thiamine 200mg QD  c/w albuterol prn  c/w tessalon perles prn for cough  Initially started on IV antibiotics by ID - discontinued   Taper off O2 as tolerated- once tolerating room air would ideally monitor for 24h prior to discharge.    Atrial fibrillation   converted to NSR   on Xarelto  rate controlled  Recently stopped Coreg, stated it was too strong for him  CHADS2 score: 5  HAS-BLED: 4  c/w ASA  c/w rivaroxaban for anticoagulation    HOSSEIN  2/2 to steroid induced DKA/dehydration  creatinine= 1.11  off IVF - PO fluids encouraged  monitor I/O   Hypomagnesemia-- Mg now at 1.9  labs for  AM    Anion Gap Diabetic Ketoacidosis   Anion gap closed - 13  HCO3 improved 24 from 17  POCT   HbA1c: 11.9% (4/6/20)  continue ISS w/ premeal and lantus   off Solumedrol  c/w lantus to 30  c/w premeal 12  frequency from q4h to ACHS  Encourage PO fluid intake  monitor electrolytes replete as necessary    Hypertension  Close blood pressure monitoring  c/w Losartan 100 QD    Hx of CVA  no new neurological symptoms  c/w statin    Prophylactic Measure  Adequately anticoagulated with Xarelto    Prognosis - guarded  Code Status: full code  Disposition - Pending clinical course and improvement  PT EVALUATION appreciated- home w/ home PT    Emergency contact: Jessica (wife) [phone] 75 y/o M w/ PMHx Afib (Xarelto), T2DM, HTN, HLD, Anemia, TIA, CVA who presented to ED due to acute hypoxic respiratory distress likely secondary to COVID pneumonia versus superimposed bacterial pneumonia     acute Hypoxic Respiratory Failure, Secondary to covid Pneumonia, viral  was worsening requiring NRB, then 50% venti, now on 3L NC  wean O2 down as tolerated  pt. now back on 4L O2 sats -- 95%- desat after PT session  COVID19 positive  Inflammatory markers trending down  CXR: b/l pneumonia  CTA Chest negative for PE   Completed Plaquenil and Solumedrol  c/w Vitamin C 500mg three times a day, Thiamine 200mg QD  c/w albuterol prn  c/w tessalon perles prn for cough  Initially started on IV antibiotics by ID - discontinued   Taper off O2 as tolerated- once tolerating room air would ideally monitor for 24h prior to discharge.    Atrial fibrillation   converted to NSR   on Xarelto  rate controlled  Recently stopped Coreg, stated it was too strong for him  CHADS2 score: 5  HAS-BLED: 4  c/w ASA  c/w rivaroxaban for anticoagulation    HOSSEIN  2/2 to steroid induced DKA/dehydration  creatinine= 1.11  off IVF - PO fluids encouraged  monitor I/O   Hypomagnesemia-- Mg now at 1.9  labs for  AM    Anion Gap Diabetic Ketoacidosis / resolved   Anion gap closed - 13  HCO3 improved 24 from 17  POCT   HbA1c: 11.9% (4/6/20)  continue ISS w/ premeal and lantus   off Solumedrol  c/w lantus to 30  c/w premeal 12  frequency from q4h to ACHS  Encourage PO fluid intake  monitor electrolytes replete as necessary    Hypertension  Close blood pressure monitoring  c/w Losartan 100 QD    Hx of CVA  no new neurological symptoms  c/w statin    Prophylactic Measure  Adequately anticoagulated with Xarelto    Prognosis - guarded  Code Status: full code  Disposition - Pending clinical course and improvement  PT EVALUATION appreciated- home w/ home PT    Emergency contact: Jessica (wife) [phone] 75 y/o M w/ PMHx Afib (Xarelto), T2DM, HTN, HLD, Anemia, TIA, CVA who presented to ED due to acute hypoxic respiratory distress likely secondary to COVID pneumonia versus superimposed bacterial pneumonia     acute Hypoxic Respiratory Failure, Secondary to covid Pneumonia, viral  was worsening requiring NRB, then 50% venti, now on 3L NC  wean O2 down as tolerated  pt. now back on 4L O2 sats -- 95%- desat after PT session  COVID19 positive  Inflammatory markers trending down  CXR: b/l pneumonia  CTA Chest negative for PE   Completed Plaquenil and Solumedrol  c/w Vitamin C 500mg three times a day, Thiamine 200mg QD  c/w albuterol prn  c/w tessalon perles prn for cough  Initially started on IV antibiotics by ID - discontinued   Taper off O2 as tolerated- once tolerating room air would ideally monitor for 24h prior to discharge.    Atrial fibrillation   converted to NSR   on Xarelto  rate controlled  Recently stopped Coreg, stated it was too strong for him  CHADS2 score: 5  HAS-BLED: 4  c/w ASA  c/w rivaroxaban for anticoagulation    HOSSEIN  2/2 to steroid induced DKA/dehydration  creatinine= 1.11  off IVF - PO fluids encouraged  monitor I/O   Hypomagnesemia-- Mg now at 1.9  labs for  AM    Anion Gap Diabetic Ketoacidosis / resolved   Anion gap closed - 13  HCO3 improved 24 from 17  POCT   HbA1c: 11.9% (4/6/20)  continue ISS w/ premeal and lantus   off Solumedrol  c/w lantus to 30  c/w premeal 12  frequency from q4h to ACHS  Encourage PO fluid intake  monitor electrolytes replete as necessary    Hypertension  Close blood pressure monitoring  c/w Losartan 100 QD    Hx of CVA  no new neurological symptoms  c/w statin    Prophylactic Measure  Adequately anticoagulated with Xarelto    Prognosis - guarded  Code Status: full code  Disposition - Pending clinical course and improvement  PT EVALUATION appreciated- home w/ home PT    Emergency contact: Jessica (wife) [phone] D/W wife today with daily updates r.e plan,

## 2020-04-15 ENCOUNTER — TRANSCRIPTION ENCOUNTER (OUTPATIENT)
Age: 75
End: 2020-04-15

## 2020-04-15 LAB
ALBUMIN SERPL ELPH-MCNC: 2.7 G/DL — LOW (ref 3.3–5.2)
ALP SERPL-CCNC: 77 U/L — SIGNIFICANT CHANGE UP (ref 40–120)
ALT FLD-CCNC: 37 U/L — SIGNIFICANT CHANGE UP
ANION GAP SERPL CALC-SCNC: 14 MMOL/L — SIGNIFICANT CHANGE UP (ref 5–17)
AST SERPL-CCNC: 19 U/L — SIGNIFICANT CHANGE UP
BILIRUB SERPL-MCNC: 0.4 MG/DL — SIGNIFICANT CHANGE UP (ref 0.4–2)
BUN SERPL-MCNC: 28 MG/DL — HIGH (ref 8–20)
CALCIUM SERPL-MCNC: 8.6 MG/DL — SIGNIFICANT CHANGE UP (ref 8.6–10.2)
CHLORIDE SERPL-SCNC: 107 MMOL/L — SIGNIFICANT CHANGE UP (ref 98–107)
CO2 SERPL-SCNC: 22 MMOL/L — SIGNIFICANT CHANGE UP (ref 22–29)
CREAT SERPL-MCNC: 1.02 MG/DL — SIGNIFICANT CHANGE UP (ref 0.5–1.3)
GLUCOSE BLDC GLUCOMTR-MCNC: 106 MG/DL — HIGH (ref 70–99)
GLUCOSE BLDC GLUCOMTR-MCNC: 108 MG/DL — HIGH (ref 70–99)
GLUCOSE BLDC GLUCOMTR-MCNC: 110 MG/DL — HIGH (ref 70–99)
GLUCOSE BLDC GLUCOMTR-MCNC: 169 MG/DL — HIGH (ref 70–99)
GLUCOSE BLDC GLUCOMTR-MCNC: 170 MG/DL — HIGH (ref 70–99)
GLUCOSE SERPL-MCNC: 121 MG/DL — HIGH (ref 70–99)
HCT VFR BLD CALC: 38.6 % — LOW (ref 39–50)
HGB BLD-MCNC: 12.5 G/DL — LOW (ref 13–17)
MAGNESIUM SERPL-MCNC: 1.7 MG/DL — LOW (ref 1.8–2.6)
MCHC RBC-ENTMCNC: 28.5 PG — SIGNIFICANT CHANGE UP (ref 27–34)
MCHC RBC-ENTMCNC: 32.4 GM/DL — SIGNIFICANT CHANGE UP (ref 32–36)
MCV RBC AUTO: 87.9 FL — SIGNIFICANT CHANGE UP (ref 80–100)
PHOSPHATE SERPL-MCNC: 3 MG/DL — SIGNIFICANT CHANGE UP (ref 2.4–4.7)
PLATELET # BLD AUTO: 299 K/UL — SIGNIFICANT CHANGE UP (ref 150–400)
POTASSIUM SERPL-MCNC: 4.1 MMOL/L — SIGNIFICANT CHANGE UP (ref 3.5–5.3)
POTASSIUM SERPL-SCNC: 4.1 MMOL/L — SIGNIFICANT CHANGE UP (ref 3.5–5.3)
PROT SERPL-MCNC: 5.7 G/DL — LOW (ref 6.6–8.7)
RBC # BLD: 4.39 M/UL — SIGNIFICANT CHANGE UP (ref 4.2–5.8)
RBC # FLD: 14.5 % — SIGNIFICANT CHANGE UP (ref 10.3–14.5)
SODIUM SERPL-SCNC: 143 MMOL/L — SIGNIFICANT CHANGE UP (ref 135–145)
WBC # BLD: 12.77 K/UL — HIGH (ref 3.8–10.5)
WBC # FLD AUTO: 12.77 K/UL — HIGH (ref 3.8–10.5)

## 2020-04-15 PROCEDURE — 99232 SBSQ HOSP IP/OBS MODERATE 35: CPT

## 2020-04-15 RX ORDER — METOPROLOL TARTRATE 50 MG
1 TABLET ORAL
Qty: 60 | Refills: 0
Start: 2020-04-15 | End: 2020-05-14

## 2020-04-15 RX ORDER — HYDRALAZINE HCL 50 MG
1 TABLET ORAL
Qty: 90 | Refills: 0
Start: 2020-04-15 | End: 2020-05-14

## 2020-04-15 RX ORDER — THIAMINE MONONITRATE (VIT B1) 100 MG
1 TABLET ORAL
Qty: 0 | Refills: 0 | DISCHARGE
Start: 2020-04-15

## 2020-04-15 RX ORDER — MAGNESIUM OXIDE 400 MG ORAL TABLET 241.3 MG
400 TABLET ORAL ONCE
Refills: 0 | Status: COMPLETED | OUTPATIENT
Start: 2020-04-15 | End: 2020-04-15

## 2020-04-15 RX ORDER — ASCORBIC ACID 60 MG
1 TABLET,CHEWABLE ORAL
Qty: 0 | Refills: 0 | DISCHARGE
Start: 2020-04-15

## 2020-04-15 RX ADMIN — ATORVASTATIN CALCIUM 40 MILLIGRAM(S): 80 TABLET, FILM COATED ORAL at 22:09

## 2020-04-15 RX ADMIN — Medication 500 MILLIGRAM(S): at 07:25

## 2020-04-15 RX ADMIN — Medication 40 MILLIEQUIVALENT(S): at 22:09

## 2020-04-15 RX ADMIN — Medication 500 MILLIGRAM(S): at 14:42

## 2020-04-15 RX ADMIN — Medication 25 MILLIGRAM(S): at 07:26

## 2020-04-15 RX ADMIN — LOSARTAN POTASSIUM 100 MILLIGRAM(S): 100 TABLET, FILM COATED ORAL at 07:26

## 2020-04-15 RX ADMIN — Medication 7 UNIT(S): at 17:37

## 2020-04-15 RX ADMIN — INSULIN GLARGINE 20 UNIT(S): 100 INJECTION, SOLUTION SUBCUTANEOUS at 22:09

## 2020-04-15 RX ADMIN — Medication 100 MILLIGRAM(S): at 14:42

## 2020-04-15 RX ADMIN — Medication 81 MILLIGRAM(S): at 14:42

## 2020-04-15 RX ADMIN — Medication 3: at 17:37

## 2020-04-15 RX ADMIN — RIVAROXABAN 20 MILLIGRAM(S): KIT at 17:37

## 2020-04-15 RX ADMIN — Medication 25 MILLIGRAM(S): at 22:09

## 2020-04-15 RX ADMIN — Medication 7 UNIT(S): at 09:09

## 2020-04-15 RX ADMIN — MAGNESIUM OXIDE 400 MG ORAL TABLET 400 MILLIGRAM(S): 241.3 TABLET ORAL at 14:42

## 2020-04-15 RX ADMIN — Medication 500 MILLIGRAM(S): at 22:09

## 2020-04-15 RX ADMIN — Medication 25 MILLIGRAM(S): at 14:42

## 2020-04-15 RX ADMIN — Medication 25 MILLIGRAM(S): at 17:37

## 2020-04-15 RX ADMIN — RIVAROXABAN 20 MILLIGRAM(S): KIT at 20:25

## 2020-04-15 RX ADMIN — Medication 7 UNIT(S): at 12:47

## 2020-04-15 RX ADMIN — AMLODIPINE BESYLATE 10 MILLIGRAM(S): 2.5 TABLET ORAL at 07:25

## 2020-04-15 NOTE — DISCHARGE NOTE PROVIDER - CARE PROVIDERS DIRECT ADDRESSES
[email or web address]

## 2020-04-15 NOTE — DISCHARGE NOTE PROVIDER - NSDCFUSCHEDAPPT_GEN_ALL_CORE_FT
BOBY EDWARDS ; 06/02/2020 ; [facility] [address] BOBY EDWARDS ; 06/02/2020 ; [facility] [address] BOBY EDWARDS ; 06/02/2020 ; [facility] [address] BOBY EDWARDS ; 06/02/2020 ; [facility] [address]

## 2020-04-15 NOTE — PROGRESS NOTE ADULT - ASSESSMENT
73 y/o M w/ PMHx Afib (Xarelto), T2DM, HTN, HLD, Anemia, TIA, CVA who presented to ED due to acute hypoxic respiratory distress secondary to COVID pneumonia. CTA with b/l pneumonia. Titrated off supplemental oxygen, now O2 sat 92-95% on room air. Inflammatory markers trending down. Completed Plaquenil and Solumedrol. With Atrial fibrillation, currently rate controlled. c/w rivaroxaban for anticoagulation. HOSSEIN improved, Cr 1.02 today 4/15. Anion gap closed. To continue home medications for blood sugar. Seen by PT, recs home with home care.  O2 sat is 92% on room air on ambulation. O2 saturation at rest 92-95% on room air. However, noted to be dyspneic when speaking in long sentences so will observe overnight and continue to observe O2 on ambulation     acute Hypoxic Respiratory Failure, Secondary to covid Pneumonia, B/L viral pna on CXR  wean O2 down as tolerated, now on RA  Inflammatory markers trending down  CTA Chest negative for PE . Completed Plaquenil and Solumedrol  c/w Vitamin C 500mg three times a day, Thiamine 200mg QD  c/w albuterol prn, tessalon perles prn for cough  Initially started on IV antibiotics by ID - discontinued    Atrial fibrillation   converted to NSR   c/w Xarelto  currently rate controlled  Recently stopped Coreg, stated it was too strong for him  CHADS2 score: 5  HAS-BLED: 4  c/w ASA    HOSSEIN  2/2 to steroid induced DKA/dehydration  creatinine= 1.02  Improved    Hypomagnesemia-- repleted    Anion Gap Diabetic Ketoacidosis / resolved   Anion gap closed - 13, HCO3 improved 24 from 17  POCT   HbA1c: 7.6  continue ISS w/ premeal and lantus   c/w lantus to 30  c/w premeal 12  Encourage PO fluid intake  monitor electrolytes replete as necessary    Hypertension  Close blood pressure monitoring  c/w Losartan 100 QD, hydralazine, lopressor, Norvasc. new Rx sent to [facility] pharmacy     Hx of CVA  no new neurological symptoms  c/w statin    Prophylactic Measure  Adequately anticoagulated with Xarelto    Prognosis - guarded  Code Status: full code  Disposition - continue to observe O2 sat overnight and on ambulation. Home w/ home PT likely 4/16    Emergency contact: Jessica (Wife) [phone] D/W wife today with daily update and plan

## 2020-04-15 NOTE — DISCHARGE NOTE PROVIDER - CARE PROVIDER_API CALL
Salvador Herbert)  Cardiology; Internal Medicine  [address] [facility], [address]  [place] [address]  Phone: [phone]  Fax: [phone]  Follow Up Time: 2 weeks    Lisa Najera ()  Family Medicine  [address] [place] [address], [address]  [place] [address]  Phone: [phone]  Fax: [phone]  Follow Up Time: 1 week

## 2020-04-15 NOTE — DISCHARGE NOTE PROVIDER - HOSPITAL COURSE
75 y/o M w/ PMHx Afib (Xarelto), T2DM, HTN, HLD, Anemia, TIA, CVA who presented to ED due to acute hypoxic respiratory distress secondary to COVID pneumonia. CTA with b/l pneumonia. Titrated off supplemental oxygen, now O2 sat 92-95% on room air. Inflammatory markers trending down. Completed Plaquenil and Solumedrol. With Atrial fibrillation, currently rate controlled. c/w rivaroxaban for anticoagulation. HOSSEIN improved, Cr 1.02 today 4/15. Anion gap closed. To continue home medications for blood sugar. Seen by PT, recs home with home care. Pt seen and examined this AM in bedside chair, in no acute distress. Ambulated with PT and on room air, O2 sat is 92%. O2 saturation at rest 92-95%. Plan of care d/w Pt and Pts wife.         Vital Signs Last 24 Hrs    T(C): 36.6 (14 Apr 2020 21:46), Max: 36.6 (14 Apr 2020 21:46)    T(F): 97.9 (14 Apr 2020 21:46), Max: 97.9 (14 Apr 2020 21:46)    HR: 67 (15 Apr 2020 05:15) (62 - 67)    BP: 145/88 (15 Apr 2020 05:15) (137/78 - 145/88)    BP(mean): --    RR: 18 (14 Apr 2020 21:46) (18 - 18)    SpO2: 97% (14 Apr 2020 21:46) (97% - 97%)        PHYSICAL EXAM:    GENERAL: NAD, sitting in bedside chair comfortably    HEAD:  Atraumatic     EYES: EOMI, PERRL, conjunctiva and sclera clear    ENT: [place], Moist mucous membranes    NECK: Supple, No JVD    CHEST/LUNG: Clear to auscultation bilaterally; No rales, rhonchi, wheezing, or rubs. Unlabored respirations    HEART: Regular rate and rhythm; No murmurs, rubs, or gallops    ABDOMEN: Bowel sounds present; Soft, Nontender, Nondistended     EXTREMITIES:  2+ Peripheral Pulses, brisk capillary refill. No clubbing, cyanosis, or edema    NERVOUS SYSTEM:  Alert & Oriented X3, speech clear. Answering questions appropriately. No deficits     MSK: FROM x 4 extremities     SKIN: No rashes or lesions        Disposition: Stable for discharge. Outpatient followup discussed. Continue medications as prescribed.     Discharge planning time 35 minutes. 73 y/o M w/ PMHx Afib (Xarelto), T2DM, HTN, HLD, Anemia, TIA, CVA who presented to ED due to acute hypoxic respiratory distress secondary to COVID pneumonia. CTA with b/l pneumonia. Titrated off supplemental oxygen, now O2 sat 92-95% on room air. Inflammatory markers trending down. Completed Plaquenil and Solumedrol. With Atrial fibrillation, currently rate controlled. c/w rivaroxaban for anticoagulation. HOSSEIN improved, Cr 1.02 today 4/15. Anion gap closed. To continue home medications for blood sugar. Seen by PT, recs home with home care. Pt seen and examined this AM in bedside chair, in no acute distress. Ambulated with PT and on room air, O2 sat is 92% on room air . O2 saturation at rest 92-95%. Plan of care d/w Pt and Pts wife.         Vital Signs Last 24 Hrs    T(C): 36.6 (15 Apr 2020 14:29), Max: 36.6 (14 Apr 2020 21:46)    T(F): 97.8 (15 Apr 2020 14:29), Max: 97.9 (14 Apr 2020 21:46)    HR: 83 (15 Apr 2020 14:29) (62 - 83)    BP: 122/60 (15 Apr 2020 14:29) (122/60 - 145/88)    BP(mean): --    RR: 22 (15 Apr 2020 14:29) (18 - 22)    SpO2: 95% (15 Apr 2020 14:29) (95% - 97%)        PHYSICAL EXAM:    GENERAL: NAD, sitting in bedside chair comfortably    HEAD:  Atraumatic     EYES: EOMI, PERRL, conjunctiva and sclera clear    ENT: [place], Moist mucous membranes    NECK: Supple, No JVD    CHEST/LUNG: Clear to auscultation bilaterally; No rales, rhonchi, wheezing, or rubs. Unlabored respirations    HEART: Regular rate and rhythm; No murmurs, rubs, or gallops    ABDOMEN: Bowel sounds present; Soft, Nontender, Nondistended     EXTREMITIES:  2+ Peripheral Pulses, brisk capillary refill. No clubbing, cyanosis, or edema    NERVOUS SYSTEM:  Alert & Oriented X3, speech clear. Answering questions appropriately. No deficits     MSK: FROM x 4 extremities     SKIN: No rashes or lesions        Disposition: Stable for discharge. Outpatient followup discussed. Continue medications as prescribed.     Discharge planning time 35 minutes. 75 y/o M w/ Hx Afib (Xarelto), T2DM, HTN, HLD, Anemia, TIA, CVA who presented to ED due to acute respiratory failure with hypoxia  secondary to COVID pneumonia. CTA with b/l pneumonia. Titrated off supplemental oxygen, now O2 sat 92-95% on room air at rest and on ambulation. Inflammatory markers trended down. Completed Plaquenil and Solumedrol. With Atrial fibrillation, currently rate controlled. c/w rivaroxaban for anticoagulation and metoprolol for rate control. HOSSEIN improved, Cr 1.02 back to baseline and Anion gap closed. To continue home medications for blood sugar control given HBA1C: 7. Seen by PT, recs home with home care.  Ambulated with PT and on room air, O2 sat is 92% on room air . O2 saturation at rest 92-95%. Plan of care d/w Pt and Pts wife. Pt medically stable to be discharged home with home care, pt to have home PT at home. Pt to f/u with pmd and cardio within one week.             Vital Signs Last 24 Hrs    T(C): 36.9 (17 Apr 2020 05:09), Max: 36.9 (17 Apr 2020 05:09)    T(F): 98.4 (17 Apr 2020 05:09), Max: 98.4 (17 Apr 2020 05:09)    HR: 67 (17 Apr 2020 05:09) (63 - 89)    BP: 149/76 (17 Apr 2020 05:09) (126/66 - 152/73)    BP(mean): 80 (16 Apr 2020 12:17) (80 - 80)    RR: 20 (17 Apr 2020 05:09) (20 - 21)    SpO2: 92% (17 Apr 2020 05:09) (92% - 97%)        PHYSICAL EXAM:    GENERAL: NAD, sitting in bedside chair comfortably    CHEST/LUNG: Clear to auscultation bilaterally; No rales, rhonchi, wheezing, or rubs. Unlabored respirations    HEART: Regular rate and rhythm; No murmurs, rubs, or gallops    ABDOMEN: Bowel sounds present; Soft, Nontender, Nondistended     EXTREMITIES:  2+ Peripheral Pulses, brisk capillary refill. No clubbing, cyanosis, or edema    SKIN: No rashes or lesions        Disposition: Stable for discharge. Outpatient followup discussed. Continue medications as prescribed.     Discharge planning time 45 minutes.

## 2020-04-15 NOTE — PROGRESS NOTE ADULT - ATTENDING COMMENTS
patient seen and eval. agree with above.   patient sats >90 on room air on sitting and ambulating with pt earlier   wife concerned that patient is still weak and tachypneic on ambulation and not comfortable with taking patient home today   will re eval o2 sats on ambulation   possible dc home in am if medically stable

## 2020-04-15 NOTE — DISCHARGE NOTE PROVIDER - PROVIDER TOKENS
PROVIDER:[TOKEN:[[id number]],FOLLOWUP:[2 weeks]],PROVIDER:[TOKEN:[[id number]],FOLLOWUP:[1 week]]

## 2020-04-15 NOTE — DISCHARGE NOTE PROVIDER - NSDCCPCAREPLAN_GEN_ALL_CORE_FT
PRINCIPAL DISCHARGE DIAGNOSIS  Diagnosis: Acute respiratory failure with hypoxia  Assessment and Plan of Treatment: resolved, on room air. due to viral pna/ COVID-19      SECONDARY DISCHARGE DIAGNOSES  Diagnosis: Afib  Assessment and Plan of Treatment: rate controlled and current regiment. in NSR. continue medications as prescribed. F/u with PMD or Cardiology    Diagnosis: HTN (hypertension)  Assessment and Plan of Treatment: new medication added and sent to [facility] pharmacy. Continiue to trend BPs and f/u with PMD for refills    Diagnosis: Diabetes mellitus  Assessment and Plan of Treatment: continue home meds. f/u outpt. Hgb A1C 7.6    Diagnosis: Pneumonia due to 2019 novel coronavirus  Assessment and Plan of Treatment: treated, improved PRINCIPAL DISCHARGE DIAGNOSIS  Diagnosis: Acute respiratory failure with hypoxia  Assessment and Plan of Treatment: due to covid 19, continue with proning at home, lay on your abdomen for at least 2 hours a day. Have someone perform chest  physical therapy on your back. Please continue with deep breathing exercises. Continue with supplementation as recommended.      SECONDARY DISCHARGE DIAGNOSES  Diagnosis: Afib  Assessment and Plan of Treatment: Currently rate controlled on current regimen. Continue with medications as prescribed. Please follow up with your cardiologist in 1-2 weeks. Please call prior to making an appointment for further management.    Diagnosis: HTN (hypertension)  Assessment and Plan of Treatment: New medication added and sent to [facility] [place] pharmacy. Continiue to trend Blood pressure with your primary care physician.    Diagnosis: Diabetes mellitus  Assessment and Plan of Treatment: Hgb A1C 7.6 Continue with your home medications.

## 2020-04-15 NOTE — PROGRESS NOTE ADULT - SUBJECTIVE AND OBJECTIVE BOX
CC: +COVID-19, viral pna   INTERVAL HPI/OVERNIGHT EVENTS: Pt seen and examined at bedside this AM by Hospitalist and PA. Says that he feels well and much improved. Denies cough, fever, chills, chest pain, difficulty breathing, abdominal pain, n/v or any other complaints. Seen by PT this AM, recs home with home care. Pt seen and examined this AM in bedside chair, in no acute distress. O2 sat is 92% on room air on ambulation. O2 saturation at rest 92-95% on room air. However, noted to be dyspneic when speaking in long sentences.    REVIEW OF SYSTEMS:  CONSTITUTIONAL: No fever, weight loss, or fatigue  EYES: No eye pain, visual disturbances, or discharge  ENT:  No difficulty hearing, tinnitus, vertigo; No sinus or throat pain  NECK: No pain or stiffness  RESPIRATORY: see HPI  CARDIOVASCULAR: No chest pain, palpitations, dizziness, or leg swelling  GASTROINTESTINAL: No abdominal or epigastric pain. No nausea, vomiting, or hematemesis; No diarrhea or constipation.  GENITOURINARY: No dysuria, frequency, hematuria, or incontinence  NEUROLOGICAL: No headaches, memory loss, loss of strength, numbness, or tremors  SKIN: No itching, burning, rashes, or lesions   MUSCULOSKELETAL: No joint pain or swelling; No muscle, back, or extremity pain    Allergies  No Known Allergies    HEALTH ISSUES - PROBLEM Dx:  PAST MEDICAL & SURGICAL HISTORY:  [place] (hard of hearing)  BPH (benign prostatic hyperplasia)  Diabetes  Atrial fibrillation: paroxysmal on xarelto  CVA (cerebral vascular accident)  Hyperlipemia  History of hypertension  History of loop recorder  S/P appendectomy    VITAL SIGNS:  T(C): 36.6 (04-15-20 @ 14:29), Max: 36.6 (04-14-20 @ 21:46)  HR: 83 (04-15-20 @ 14:29) (62 - 83)  BP: 122/60 (04-15-20 @ 14:29) (122/60 - 145/88)  RR: 22 (04-15-20 @ 14:29) (18 - 22)  SpO2: 95% (04-15-20 @ 14:29) (95% - 97%)  Wt(kg): --Vital Signs Last 24 Hrs  T(C): 36.6 (15 Apr 2020 14:29), Max: 36.6 (14 Apr 2020 21:46)  T(F): 97.8 (15 Apr 2020 14:29), Max: 97.9 (14 Apr 2020 21:46)  HR: 83 (15 Apr 2020 14:29) (62 - 83)  BP: 122/60 (15 Apr 2020 14:29) (122/60 - 145/88)  BP(mean): --  RR: 22 (15 Apr 2020 14:29) (18 - 22)  SpO2: 95% (15 Apr 2020 14:29) (95% - 97%)    PHYSICAL EXAM:  GENERAL: Pt sitting in bedside chair comfortably in NAD  HEAD:  Atraumatic   EYES: EOMI, PERRL, conjunctiva and sclera clear  ENT: Moist mucous membranes  NECK: Supple, No JVD  CHEST/LUNG: Clear to auscultation bilaterally; No rales, rhonchi, wheezing, or rubs. Unlabored respirations, mild dyspnea when speaking long sentences   HEART: Regular rate and rhythm; No murmurs, rubs, or gallops  ABDOMEN: Bowel sounds present; Soft, Nontender, Nondistended. No guarding or rigidity   EXTREMITIES:  2+ Peripheral Pulses, brisk capillary refill. No clubbing, cyanosis, or edema  NERVOUS SYSTEM:  Alert & Oriented X3, speech clear, FROM x 4 extremities. No deficits   SKIN: No rashes or lesions    LABS:                         12.5   12.77 )-----------( 299      ( 15 Apr 2020 07:21 )             38.6     04-15    143  |  107  |  28.0<H>  ----------------------------<  121<H>  4.1   |  22.0  |  1.02    Ca    8.6      15 Apr 2020 07:21  Phos  3.0     04-15  Mg     1.7     04-15    TPro  5.7<L>  /  Alb  2.7<L>  /  TBili  0.4  /  DBili  x   /  AST  19  /  ALT  37  /  AlkPhos  77  04-15    Procalcitonin, Serum: 0.06 (04-10)    C-Reactive Protein, Serum: 0.46 (04-14)  C-Reactive Protein, Serum: 0.52 (04-13)  C-Reactive Protein, Serum: 0.65 (04-12)  C-Reactive Protein, Serum: 1.04 (04-11)  C-Reactive Protein, Serum: 1.96 (04-10)  C-Reactive Protein, Serum: 3.04 (04-09)  C-Reactive Protein, Serum: 6.89 (04-08)  C-Reactive Protein, Serum: 9.68 (04-07)  C-Reactive Protein, Serum: 15.84 (04-06)    Ferritin, Serum: 2555 (04-14)  Ferritin, Serum: 1078 (04-13)  Ferritin, Serum: 1050 (04-12)  Ferritin, Serum: 1285 (04-11)  Ferritin, Serum: 1349 (04-10)  Ferritin, Serum: 1887 (04-09)  Ferritin, Serum: 2121 (04-08)  Ferritin, Serum: 2291 (04-07)  Ferritin, Serum: 2168 (04-06)    D-Dimer Assay, Quantitative: 7450 (04-10)  D-Dimer Assay, Quantitative: 54347 (04-08)  D-Dimer Assay, Quantitative: 85767 (04-07)  D-Dimer Assay, Quantitative: 88045 (04-06)    CAPILLARY BLOOD GLUCOSE  POCT Blood Glucose.: 110 mg/dL (15 Apr 2020 12:38)  POCT Blood Glucose.: 106 mg/dL (15 Apr 2020 08:05)  POCT Blood Glucose.: 231 mg/dL (14 Apr 2020 21:07)  POCT Blood Glucose.: 203 mg/dL (14 Apr 2020 17:18)  POCT Blood Glucose.: 189 mg/dL (14 Apr 2020 16:28)

## 2020-04-15 NOTE — DISCHARGE NOTE PROVIDER - NSDCMRMEDTOKEN_GEN_ALL_CORE_FT
amLODIPine 10 mg oral tablet: 1 tab(s) orally once a day  ascorbic acid 500 mg oral tablet: 1 tab(s) orally 3 times a day  aspirin 81 mg oral tablet, chewable: 1 tab(s) orally once a day  atorvastatin 40 mg oral tablet: 1 tab(s) orally once a day  hydrALAZINE 25 mg oral tablet: 1 tab(s) orally every 8 hours  losartan 100 mg oral tablet: 1 tab(s) orally once a day  metFORMIN 500 mg oral tablet: 1 tab(s) orally 2 times a day  metoprolol tartrate 25 mg oral tablet: 1 tab(s) orally 2 times a day  pioglitazone 45 mg oral tablet: orally once a day  thiamine 100 mg oral tablet: 1 tab(s) orally once a day  Xarelto 20 mg oral tablet: 1 tab(s) orally once a day (in the evening)

## 2020-04-16 LAB
ALBUMIN SERPL ELPH-MCNC: 3 G/DL — LOW (ref 3.3–5.2)
ALP SERPL-CCNC: 77 U/L — SIGNIFICANT CHANGE UP (ref 40–120)
ALT FLD-CCNC: 37 U/L — SIGNIFICANT CHANGE UP
ANION GAP SERPL CALC-SCNC: 14 MMOL/L — SIGNIFICANT CHANGE UP (ref 5–17)
AST SERPL-CCNC: 24 U/L — SIGNIFICANT CHANGE UP
BILIRUB SERPL-MCNC: 0.4 MG/DL — SIGNIFICANT CHANGE UP (ref 0.4–2)
BUN SERPL-MCNC: 25 MG/DL — HIGH (ref 8–20)
CALCIUM SERPL-MCNC: 8.9 MG/DL — SIGNIFICANT CHANGE UP (ref 8.6–10.2)
CHLORIDE SERPL-SCNC: 106 MMOL/L — SIGNIFICANT CHANGE UP (ref 98–107)
CO2 SERPL-SCNC: 22 MMOL/L — SIGNIFICANT CHANGE UP (ref 22–29)
CREAT SERPL-MCNC: 1.16 MG/DL — SIGNIFICANT CHANGE UP (ref 0.5–1.3)
GLUCOSE BLDC GLUCOMTR-MCNC: 103 MG/DL — HIGH (ref 70–99)
GLUCOSE BLDC GLUCOMTR-MCNC: 116 MG/DL — HIGH (ref 70–99)
GLUCOSE BLDC GLUCOMTR-MCNC: 135 MG/DL — HIGH (ref 70–99)
GLUCOSE BLDC GLUCOMTR-MCNC: 70 MG/DL — SIGNIFICANT CHANGE UP (ref 70–99)
GLUCOSE SERPL-MCNC: 114 MG/DL — HIGH (ref 70–99)
HCT VFR BLD CALC: 37.7 % — LOW (ref 39–50)
HGB BLD-MCNC: 12.5 G/DL — LOW (ref 13–17)
MAGNESIUM SERPL-MCNC: 1.7 MG/DL — SIGNIFICANT CHANGE UP (ref 1.6–2.6)
MCHC RBC-ENTMCNC: 28.7 PG — SIGNIFICANT CHANGE UP (ref 27–34)
MCHC RBC-ENTMCNC: 33.2 GM/DL — SIGNIFICANT CHANGE UP (ref 32–36)
MCV RBC AUTO: 86.5 FL — SIGNIFICANT CHANGE UP (ref 80–100)
PHOSPHATE SERPL-MCNC: 2.2 MG/DL — LOW (ref 2.4–4.7)
PLATELET # BLD AUTO: 273 K/UL — SIGNIFICANT CHANGE UP (ref 150–400)
POTASSIUM SERPL-MCNC: 4.6 MMOL/L — SIGNIFICANT CHANGE UP (ref 3.5–5.3)
POTASSIUM SERPL-SCNC: 4.6 MMOL/L — SIGNIFICANT CHANGE UP (ref 3.5–5.3)
PROT SERPL-MCNC: 5.8 G/DL — LOW (ref 6.6–8.7)
RBC # BLD: 4.36 M/UL — SIGNIFICANT CHANGE UP (ref 4.2–5.8)
RBC # FLD: 14.5 % — SIGNIFICANT CHANGE UP (ref 10.3–14.5)
SODIUM SERPL-SCNC: 142 MMOL/L — SIGNIFICANT CHANGE UP (ref 135–145)
WBC # BLD: 10.47 K/UL — SIGNIFICANT CHANGE UP (ref 3.8–10.5)
WBC # FLD AUTO: 10.47 K/UL — SIGNIFICANT CHANGE UP (ref 3.8–10.5)

## 2020-04-16 PROCEDURE — 99232 SBSQ HOSP IP/OBS MODERATE 35: CPT

## 2020-04-16 RX ORDER — SODIUM CHLORIDE 9 MG/ML
1000 INJECTION INTRAMUSCULAR; INTRAVENOUS; SUBCUTANEOUS
Refills: 0 | Status: DISCONTINUED | OUTPATIENT
Start: 2020-04-16 | End: 2020-04-17

## 2020-04-16 RX ADMIN — Medication 7 UNIT(S): at 13:11

## 2020-04-16 RX ADMIN — Medication 500 MILLIGRAM(S): at 20:14

## 2020-04-16 RX ADMIN — Medication 25 MILLIGRAM(S): at 20:14

## 2020-04-16 RX ADMIN — INSULIN GLARGINE 20 UNIT(S): 100 INJECTION, SOLUTION SUBCUTANEOUS at 21:50

## 2020-04-16 RX ADMIN — SODIUM CHLORIDE 90 MILLILITER(S): 9 INJECTION INTRAMUSCULAR; INTRAVENOUS; SUBCUTANEOUS at 20:14

## 2020-04-16 RX ADMIN — RIVAROXABAN 20 MILLIGRAM(S): KIT at 17:57

## 2020-04-16 RX ADMIN — Medication 25 MILLIGRAM(S): at 12:05

## 2020-04-16 RX ADMIN — ATORVASTATIN CALCIUM 40 MILLIGRAM(S): 80 TABLET, FILM COATED ORAL at 20:14

## 2020-04-16 RX ADMIN — Medication 500 MILLIGRAM(S): at 12:05

## 2020-04-16 RX ADMIN — Medication 7 UNIT(S): at 08:22

## 2020-04-16 RX ADMIN — Medication 100 MILLIGRAM(S): at 12:05

## 2020-04-16 RX ADMIN — Medication 81 MILLIGRAM(S): at 12:05

## 2020-04-16 RX ADMIN — Medication 25 MILLIGRAM(S): at 17:57

## 2020-04-16 NOTE — CHART NOTE - NSCHARTNOTEFT_GEN_A_CORE
Called by RN to request discontinuation of  due to need for other patients.  Patient has not had any desats in the last 24hrs and is on room air. Patient is being prepared for discharge.   Will change pulse ox from continuous to q2 hours.   Continue to monitor patient.  Notify PA of any changes in patient status.

## 2020-04-16 NOTE — PROGRESS NOTE ADULT - ATTENDING COMMENTS
patient seen and eval. agree with above.   patient has orthostatic hypotension   also de sats on ambulation n room air   ok with gentle ivf   will need home o2   possible dc in am if medically stable

## 2020-04-16 NOTE — PROGRESS NOTE ADULT - SUBJECTIVE AND OBJECTIVE BOX
HPI:  73 y/o M w/ PMHx Afib (Xarelto), T2DM, HTN, HLD, Anemia, TIA, CVA who presented to ED due to SOB for 2 weeks and fever of 102F. He has also been having diarrhea for 2 weeks on/off. He was admitted from 03/28 to 03/30 for pneumonia and was tested positive for COVID during that admission. He did not have any hypoxia during that time and did not require supplemental oxygen. He was treated with Azithro and Rocephin during that hospitalization.  Patient denies HA, dizziness, LOC, CP, cough, Abd pain, N/V, dysuria. (06 Apr 2020 16:56)      ROS: All review of systems negative unless indicated otherwise below.                          LAB RESULTS                   COMPLETE BLOOD COUNT( 16 Apr 2020 06:24 )                            12.5 g/dL<L>  10.47 K/uL )---------------( 273 K/uL                        37.7 %<L>      Automated Differential     Auto Basophil # - X      Auto Basophil % - X      Auto Eosinophil # - X      Auto Eosinophil % - X      Auto Immature Granulocyte # - X      Auto Immature Granulocyte % - X      Auto Lymphocyte # - X      Auto Lymphocyte % - X      Auto Monocyte # - X      Auto Monocyte % - X      Auto Neutrophil # - X      Auto Neutrophil % - X                                      CHEMISTRY                 Basic Metabolic Panel (04-16-20 @ 06:24)    142  |  106  |  25.0<H>  ----------------------------<  114<H>  4.6   |  22.0  |  1.16    Ca    8.9      16 Apr 2020 06:24  Phos  2.2     04-16  Mg     1.7     04-16                    Liver Functions (04-16-20 @ 06:24))  TPro  5.8  /  Alb  3.0  /  TBili  0.4  /  DBili  x   /  AST  24  /  ALT  37  /  AlkPhos  77     PT/INR/PTT ( 28 Mar 2020 06:41 )                        :                       :      24.2         :       47.2                  .        .                   .              .           .       2.09        .                                                             MICROBIOLOGY/CULTURES:    Culture - Blood (collected 04-07-20 @ 20:10)  Source: .Blood  Final Report (04-12-20 @ 21:00):    No growth at 5 days.    Culture - Blood (collected 03-28-20 @ 03:54)  Source: .Blood  Final Report (04-02-20 @ 05:00):    No growth at 5 days.                          CARDIOLOGY REVIEW: Personally visualized and reviewed  Telemetry Last 24h: SR/ST , 1 episode of PAT up to 140 x 2.5 seconds.                              INTAKE AND OUTPUT - 48 HOUR TREND     04-14-20 @ 07:01  -  04-15-20 @ 07:00  --------------------------------------------------------  IN:  Total IN: 0 mL    OUT:    Voided: 775 mL  Total OUT: 775 mL    Total NET: -775 mL      04-15-20 @ 07:01  -  04-16-20 @ 07:00  --------------------------------------------------------  IN:  Total IN: 0 mL    OUT:    Voided: 600 mL  Total OUT: 600 mL    Total NET: -600 mL    HOME MEDICATIONS:  amLODIPine 10 mg oral tablet: 1 tab(s) orally once a day (06 Apr 2020 16:23)  ascorbic acid 500 mg oral tablet: 1 tab(s) orally 3 times a day (15 Apr 2020 11:59)  aspirin 81 mg oral tablet, chewable: 1 tab(s) orally once a day (06 Apr 2020 16:23)  atorvastatin 40 mg oral tablet: 1 tab(s) orally once a day (06 Apr 2020 16:23)  losartan 100 mg oral tablet: 1 tab(s) orally once a day (06 Apr 2020 16:23)  metFORMIN 500 mg oral tablet: 1 tab(s) orally 2 times a day (06 Apr 2020 16:23)  pioglitazone 45 mg oral tablet: orally once a day (06 Apr 2020 16:23)  thiamine 100 mg oral tablet: 1 tab(s) orally once a day (15 Apr 2020 11:59)  Xarelto 20 mg oral tablet: 1 tab(s) orally once a day (in the evening) (06 Apr 2020 16:23)                             Current Admission Active Medications    ALBUTerol    90 MICROgram(s) HFA Inhaler 1 Puff(s) Inhalation every 4 hours PRN Shortness of Breath and/or Wheezing  amLODIPine   Tablet 10 milliGRAM(s) Oral daily  ascorbic acid 500 milliGRAM(s) Oral three times a day  aspirin  chewable 81 milliGRAM(s) Oral daily  atorvastatin 40 milliGRAM(s) Oral at bedtime  benzonatate 100 milliGRAM(s) Oral three times a day PRN Cough  dextrose 40% Gel 15 Gram(s) Oral once PRN Blood Glucose LESS THAN 70 milliGRAM(s)/deciliter  dextrose 5%. 1000 milliLiter(s) (50 mL/Hr) IV Continuous <Continuous>  dextrose 50% Injectable 12.5 Gram(s) IV Push once  dextrose 50% Injectable 25 Gram(s) IV Push once  dextrose 50% Injectable 25 Gram(s) IV Push once  glucagon  Injectable 1 milliGRAM(s) IntraMuscular once PRN Glucose LESS THAN 70 milligrams/deciliter  glucagon  Injectable 1 milliGRAM(s) IntraMuscular once PRN Glucose LESS THAN 70 milligrams/deciliter  hydrALAZINE 25 milliGRAM(s) Oral every 8 hours  insulin glargine Injectable (LANTUS) 20 Unit(s) SubCutaneous at bedtime  insulin lispro (HumaLOG) corrective regimen sliding scale   SubCutaneous three times a day before meals  insulin lispro (HumaLOG) corrective regimen sliding scale   SubCutaneous at bedtime  insulin lispro Injectable (HumaLOG) 7 Unit(s) SubCutaneous three times a day with meals  losartan 100 milliGRAM(s) Oral daily  metoprolol tartrate 25 milliGRAM(s) Oral two times a day  rivaroxaban 20 milliGRAM(s) Oral with dinner  thiamine 100 milliGRAM(s) Oral daily                        PHYSICAL EXAM:    Vital Signs Last 24 Hrs  T(C): 36.5 (16 Apr 2020 06:08), Max: 36.7 (15 Apr 2020 19:30)  T(F): 97.7 (16 Apr 2020 06:08), Max: 98 (15 Apr 2020 19:30)  HR: 70 (16 Apr 2020 06:08) (70 - 83)  BP: 143/77 (16 Apr 2020 06:08) (122/60 - 143/77)  BP(mean): --  RR: 16 (16 Apr 2020 06:08) (16 - 22)  SpO2: 95% (16 Apr 2020 06:08) (94% - 95%)    GENERAL: NAD  NECK: Supple, No JVD  NERVOUS SYSTEM:  Alert & Oriented X3, non focal neuro exam.   CHEST/LUNG: diminished lungs bibasilar crackles resolving, no wheezes  HEART: Regular rate and rhythm; s1 and s2 auscultated, No murmurs, rubs, or gallops  ABDOMEN: Soft, Nontender, Nondistended; Bowel sounds present and normoactive.   EXTREMITIES:  2+ Peripheral Pulses, No clubbing, cyanosis, or edema

## 2020-04-17 ENCOUNTER — TRANSCRIPTION ENCOUNTER (OUTPATIENT)
Age: 75
End: 2020-04-17

## 2020-04-17 VITALS
RESPIRATION RATE: 20 BRPM | DIASTOLIC BLOOD PRESSURE: 69 MMHG | HEART RATE: 64 BPM | OXYGEN SATURATION: 92 % | SYSTOLIC BLOOD PRESSURE: 149 MMHG | TEMPERATURE: 99 F

## 2020-04-17 LAB — GLUCOSE BLDC GLUCOMTR-MCNC: 93 MG/DL — SIGNIFICANT CHANGE UP (ref 70–99)

## 2020-04-17 PROCEDURE — 99239 HOSP IP/OBS DSCHRG MGMT >30: CPT | Mod: CS

## 2020-04-17 RX ADMIN — AMLODIPINE BESYLATE 10 MILLIGRAM(S): 2.5 TABLET ORAL at 05:09

## 2020-04-17 RX ADMIN — Medication 500 MILLIGRAM(S): at 05:09

## 2020-04-17 RX ADMIN — Medication 25 MILLIGRAM(S): at 05:09

## 2020-04-17 RX ADMIN — LOSARTAN POTASSIUM 100 MILLIGRAM(S): 100 TABLET, FILM COATED ORAL at 05:10

## 2020-04-17 RX ADMIN — Medication 25 MILLIGRAM(S): at 05:10

## 2020-04-17 NOTE — PROGRESS NOTE ADULT - SUBJECTIVE AND OBJECTIVE BOX
CC: +COVID-19, viral pna     INTERVAL HPI/OVERNIGHT EVENTS: No overnight events    Pt seen and examined at bedside  Sitting up in bed  Wants to go home  No O2 on discharge, 92% on room air on ambulation  To be DCed today  ROS neg  VSS     Vital Signs Last 24 Hrs  T(C): 37 (17 Apr 2020 09:54), Max: 37 (17 Apr 2020 09:41)  T(F): 98.6 (17 Apr 2020 09:54), Max: 98.6 (17 Apr 2020 09:41)  HR: 64 (17 Apr 2020 09:54) (63 - 89)  BP: 149/69 (17 Apr 2020 09:54) (126/66 - 152/73)  BP(mean): 80 (16 Apr 2020 12:17) (80 - 80)  RR: 20 (17 Apr 2020 09:54) (20 - 21)  SpO2: 92% (17 Apr 2020 09:54) (92% - 97%)    PHYSICAL EXAM:  GENERAL: Pt sitting in bedside chair comfortably in NAD  HEAD:  Atraumatic   EYES: EOMI, PERRL, conjunctiva and sclera clear  ENT: Moist mucous membranes  NECK: Supple, No JVD  CHEST/LUNG: Clear to auscultation bilaterally; No rales, rhonchi, wheezing, or rubs. Unlabored respirations, mild dyspnea when speaking long sentences   HEART: Regular rate and rhythm; No murmurs, rubs, or gallops  ABDOMEN: Bowel sounds present; Soft, Nontender, Nondistended. No guarding or rigidity   EXTREMITIES:  2+ Peripheral Pulses, brisk capillary refill. No clubbing, cyanosis, or edema  NERVOUS SYSTEM:  Alert & Oriented X3, speech clear, FROM x 4 extremities. No deficits       Labs/imaging: reviewed

## 2020-04-17 NOTE — DISCHARGE NOTE NURSING/CASE MANAGEMENT/SOCIAL WORK - PATIENT PORTAL LINK FT
You can access the FollowMyHealth Patient Portal offered by [facility] by registering at the following website: [email or web address]. By joining [employer]’s FollowMyHealth portal, you will also be able to view your health information using other applications (apps) compatible with our system.

## 2020-04-17 NOTE — PROGRESS NOTE ADULT - REASON FOR ADMISSION
Dyspnea

## 2020-04-30 PROCEDURE — 82728 ASSAY OF FERRITIN: CPT

## 2020-04-30 PROCEDURE — 82962 GLUCOSE BLOOD TEST: CPT

## 2020-04-30 PROCEDURE — 85652 RBC SED RATE AUTOMATED: CPT

## 2020-04-30 PROCEDURE — 80048 BASIC METABOLIC PNL TOTAL CA: CPT

## 2020-04-30 PROCEDURE — 97530 THERAPEUTIC ACTIVITIES: CPT

## 2020-04-30 PROCEDURE — 97110 THERAPEUTIC EXERCISES: CPT

## 2020-04-30 PROCEDURE — 97116 GAIT TRAINING THERAPY: CPT

## 2020-04-30 PROCEDURE — 83735 ASSAY OF MAGNESIUM: CPT

## 2020-04-30 PROCEDURE — 84100 ASSAY OF PHOSPHORUS: CPT

## 2020-04-30 PROCEDURE — 94640 AIRWAY INHALATION TREATMENT: CPT

## 2020-04-30 PROCEDURE — 83615 LACTATE (LD) (LDH) ENZYME: CPT

## 2020-04-30 PROCEDURE — 99285 EMERGENCY DEPT VISIT HI MDM: CPT

## 2020-04-30 PROCEDURE — 87040 BLOOD CULTURE FOR BACTERIA: CPT

## 2020-04-30 PROCEDURE — 36415 COLL VENOUS BLD VENIPUNCTURE: CPT

## 2020-04-30 PROCEDURE — 85027 COMPLETE CBC AUTOMATED: CPT

## 2020-04-30 PROCEDURE — 93005 ELECTROCARDIOGRAM TRACING: CPT

## 2020-04-30 PROCEDURE — 82009 KETONE BODYS QUAL: CPT

## 2020-04-30 PROCEDURE — 86140 C-REACTIVE PROTEIN: CPT

## 2020-04-30 PROCEDURE — 83605 ASSAY OF LACTIC ACID: CPT

## 2020-04-30 PROCEDURE — 71275 CT ANGIOGRAPHY CHEST: CPT

## 2020-04-30 PROCEDURE — 85379 FIBRIN DEGRADATION QUANT: CPT

## 2020-04-30 PROCEDURE — 71045 X-RAY EXAM CHEST 1 VIEW: CPT

## 2020-04-30 PROCEDURE — 84145 PROCALCITONIN (PCT): CPT

## 2020-04-30 PROCEDURE — 80053 COMPREHEN METABOLIC PANEL: CPT

## 2020-05-28 DIAGNOSIS — Z86.73 PERSONAL HISTORY OF TRANSIENT ISCHEMIC ATTACK (TIA), AND CEREBRAL INFARCTION W/OUT RESIDUAL DEFICITS: ICD-10-CM

## 2020-05-28 DIAGNOSIS — Z86.39 PERSONAL HISTORY OF OTHER ENDOCRINE, NUTRITIONAL AND METABOLIC DISEASE: ICD-10-CM

## 2020-05-28 DIAGNOSIS — Z87.09 PERSONAL HISTORY OF OTHER DISEASES OF THE RESPIRATORY SYSTEM: ICD-10-CM

## 2020-05-28 DIAGNOSIS — R06.02 SHORTNESS OF BREATH: ICD-10-CM

## 2020-05-28 DIAGNOSIS — Z86.2 PERSONAL HISTORY OF DISEASES OF THE BLOOD AND BLOOD-FORMING ORGANS AND CERTAIN DISORDERS INVOLVING THE IMMUNE MECHANISM: ICD-10-CM

## 2020-05-28 DIAGNOSIS — Z86.79 PERSONAL HISTORY OF OTHER DISEASES OF THE CIRCULATORY SYSTEM: ICD-10-CM

## 2020-05-28 RX ORDER — AMLODIPINE BESYLATE 10 MG/1
10 TABLET ORAL DAILY
Refills: 0 | Status: ACTIVE | COMMUNITY

## 2020-05-28 RX ORDER — LOSARTAN POTASSIUM AND HYDROCHLOROTHIAZIDE 25; 100 MG/1; MG/1
100-25 TABLET ORAL
Qty: 90 | Refills: 3 | Status: DISCONTINUED | COMMUNITY
Start: 2018-07-20 | End: 2020-05-28

## 2020-05-28 RX ORDER — LOSARTAN POTASSIUM 100 MG/1
100 TABLET, FILM COATED ORAL DAILY
Qty: 90 | Refills: 0 | Status: ACTIVE | COMMUNITY

## 2020-05-28 RX ORDER — AMLODIPINE BESYLATE AND BENAZEPRIL HYDROCHLORIDE 5; 10 MG/1; MG/1
5-10 CAPSULE ORAL
Qty: 90 | Refills: 1 | Status: DISCONTINUED | COMMUNITY
Start: 2020-03-06 | End: 2020-05-28

## 2020-06-02 ENCOUNTER — APPOINTMENT (OUTPATIENT)
Dept: CARDIOLOGY | Facility: CLINIC | Age: 75
End: 2020-06-02
Payer: MEDICARE

## 2020-06-02 VITALS — SYSTOLIC BLOOD PRESSURE: 142 MMHG | DIASTOLIC BLOOD PRESSURE: 64 MMHG

## 2020-06-02 VITALS
TEMPERATURE: 98.6 F | BODY MASS INDEX: 28.92 KG/M2 | OXYGEN SATURATION: 99 % | WEIGHT: 202 LBS | HEIGHT: 70 IN | HEART RATE: 60 BPM

## 2020-06-02 PROCEDURE — 99215 OFFICE O/P EST HI 40 MIN: CPT

## 2020-06-02 PROCEDURE — 93000 ELECTROCARDIOGRAM COMPLETE: CPT

## 2020-06-02 RX ORDER — METFORMIN HYDROCHLORIDE 500 MG/1
500 TABLET, COATED ORAL TWICE DAILY
Refills: 0 | Status: ACTIVE | COMMUNITY

## 2020-06-02 RX ORDER — METOPROLOL TARTRATE 25 MG/1
25 TABLET, FILM COATED ORAL TWICE DAILY
Refills: 0 | Status: DISCONTINUED | COMMUNITY
End: 2020-06-02

## 2020-06-02 NOTE — HISTORY OF PRESENT ILLNESS
[FreeTextEntry1] : TIA/ stroke, s/p RONNY and Implantable loop recorder.\par \par HPI for today: : recent covid infection. may 2020.  he was in hospital for 2 weeks.  he had Blood clot in the right lower leg. he was on anticoagulation for atrial fibrillation . \par his PMD has ordered US venosu Dupelx right leg. \par he has been feelign ok now. no cough. NO fever. No headaches. He is AAntibody positive. \par \par old note:  feels good. no chest pain. no palpitaitons. no headaches. \par no syncope. \par no other peisode of tia.  ate chinise food last night. \par Chronic conditions are stable and no acute change.\par Hypertension: unControl on meds. compliant with meds. Reconciliation done.non complaint with diet. \par hyperlipidemia: at goal on meds. Compliant with meds. reconciliation done.\par   risk of coronary artery disease:  Non Compliant with diet and lifestyle management.\par  \par  \par \par old note: Complains of right shoulder pain. also the left arm opain. no chest pain one dxertion. no dizziness. no dyspnea. no palpitations. \par no headaches\par states its worse when he lifts above the head.\par at night hits hand gets numb.\par \par \par old note: feels good. No chest pain. no headache. no dizziness.\par Does not exercise.\par \par old note: patient feels better. was seen by PMD . was started on beta blocker  due to one episode of high heart rate. found to be anemic. PMD presumes GI blood loss anemia on anticoagulation . \par No dark color stools. Brown color stools. No GERD symptoms. No gastritis. No history of peptic ulcer. last colonoscopy was done 2 years ago. one polyp removed. Prior history of polyps that were removed. \par \par old note: No chest pain.   No dyspnea. No Palpitations.  last time loop check showed Afib. was started on Anticoagulation. \par \par Old note: patient had paroxysmal afib on loop recorder. 2 mins. Started on eliquis. High co pay. 190$. will switch to xarelto. Stopped plavix. Drinks too much coffee. 3-4 cups a day. NO palpitations. No diizzienss. Complain f left arm numbness. No chest pain. 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 \par Denies any chest pain. No dyspnea.  \par RONNY showed Atherosclerotic plaque in the aorta. \par No dizziness.\par \par

## 2020-06-02 NOTE — DISCUSSION/SUMMARY
[Patient] : the patient [Benefits] : benefits [Risks] : risks [Alternatives] : alternatives [___ Month(s)] : [unfilled] month(s) [With Me] : with me [FreeTextEntry1] : This is a 73 year old male with HTN, DM, aortic atherosclerosis with pontine infarct with pontine infarct.\par 1) shoulder pain. right shoulder pain. musculoskeletal. \par \par 1. P afib: CHADSVASC 4, SR on EKG today. ct. Xarelto\par 2. Left pontine infarct: Cryptogenic. afib and on anticoagulation. ct asa 81 mg daily. ct lipitor, \par 3. HTN: elevated today, 158/68,  amlodipine - 10 mg.  losartan 100 mg, metoprolol. \par 4. right leg DVT: was on anticoagulation . Will repeat US venous leg.

## 2020-06-02 NOTE — PHYSICAL EXAM
[General Appearance - Well Developed] : well developed [Well Groomed] : well groomed [Normal Appearance] : normal appearance [General Appearance - Well Nourished] : well nourished [No Deformities] : no deformities [General Appearance - In No Acute Distress] : no acute distress [Normal Conjunctiva] : the conjunctiva exhibited no abnormalities [Eyelids - No Xanthelasma] : the eyelids demonstrated no xanthelasmas [Normal Oral Mucosa] : normal oral mucosa [No Oral Pallor] : no oral pallor [No Oral Cyanosis] : no oral cyanosis [Normal Jugular Venous A Waves Present] : normal jugular venous A waves present [Normal Jugular Venous V Waves Present] : normal jugular venous V waves present [No Jugular Venous Leos A Waves] : no jugular venous leos A waves [Respiration, Rhythm And Depth] : normal respiratory rhythm and effort [Exaggerated Use Of Accessory Muscles For Inspiration] : no accessory muscle use [Auscultation Breath Sounds / Voice Sounds] : lungs were clear to auscultation bilaterally [Heart Rate And Rhythm] : heart rate and rhythm were normal [Heart Sounds] : normal S1 and S2 [Murmurs] : no murmurs present [Abdomen Tenderness] : non-tender [Abdomen Soft] : soft [Abdomen Mass (___ Cm)] : no abdominal mass palpated [Abnormal Walk] : normal gait [FreeTextEntry1] : canot walk on treadmill. afraid to fall  [Nail Clubbing] : no clubbing of the fingernails [Cyanosis, Localized] : no localized cyanosis [Petechial Hemorrhages (___cm)] : no petechial hemorrhages [Skin Color & Pigmentation] : normal skin color and pigmentation [] : no rash [No Venous Stasis] : no venous stasis [Skin Lesions] : no skin lesions [No Skin Ulcers] : no skin ulcer [No Xanthoma] : no  xanthoma was observed [Oriented To Time, Place, And Person] : oriented to person, place, and time [Affect] : the affect was normal [Mood] : the mood was normal [No Anxiety] : not feeling anxious

## 2020-06-02 NOTE — END OF VISIT
[>50% of Time Spent on Counseling and Coordination of Care for  ___] : Greater than 50% of the encounter time was spent on counseling and coordination of care for [unfilled] known

## 2020-06-02 NOTE — CARDIOLOGY SUMMARY
[___] : [unfilled] [LVEF ___%] : LVEF [unfilled]% [None] : normal LV function [Normal] : normal LA size [Mild] : mild mitral regurgitation [de-identified] : RONNY: Marked atherosclerosis of aorta. Otherwise, no other cardiac source of emboli. NO significant valvular abnormality.

## 2020-11-10 ENCOUNTER — APPOINTMENT (OUTPATIENT)
Dept: CARDIOLOGY | Facility: CLINIC | Age: 75
End: 2020-11-10
Payer: MEDICARE

## 2020-11-10 ENCOUNTER — NON-APPOINTMENT (OUTPATIENT)
Age: 75
End: 2020-11-10

## 2020-11-10 VITALS
HEART RATE: 58 BPM | WEIGHT: 215 LBS | HEIGHT: 70 IN | DIASTOLIC BLOOD PRESSURE: 60 MMHG | SYSTOLIC BLOOD PRESSURE: 138 MMHG | BODY MASS INDEX: 30.78 KG/M2 | OXYGEN SATURATION: 97 % | TEMPERATURE: 97.2 F

## 2020-11-10 VITALS — DIASTOLIC BLOOD PRESSURE: 70 MMHG | SYSTOLIC BLOOD PRESSURE: 148 MMHG

## 2020-11-10 DIAGNOSIS — Z87.438 PERSONAL HISTORY OF OTHER DISEASES OF MALE GENITAL ORGANS: ICD-10-CM

## 2020-11-10 PROCEDURE — 99215 OFFICE O/P EST HI 40 MIN: CPT

## 2020-11-10 PROCEDURE — 93000 ELECTROCARDIOGRAM COMPLETE: CPT

## 2020-11-10 RX ORDER — CHROMIUM 200 MCG
TABLET ORAL
Refills: 0 | Status: DISCONTINUED | COMMUNITY
End: 2020-11-10

## 2020-11-10 RX ORDER — MULTIVIT-MIN/FOLIC/VIT K/LYCOP 400-300MCG
500 TABLET ORAL 3 TIMES DAILY
Refills: 0 | Status: DISCONTINUED | COMMUNITY
End: 2020-11-10

## 2020-11-10 RX ORDER — METOPROLOL SUCCINATE 25 MG/1
25 TABLET, EXTENDED RELEASE ORAL
Qty: 90 | Refills: 0 | Status: DISCONTINUED | COMMUNITY
End: 2020-11-10

## 2020-11-10 RX ORDER — ERGOCALCIFEROL 1.25 MG/1
1.25 MG CAPSULE, LIQUID FILLED ORAL
Refills: 0 | Status: ACTIVE | COMMUNITY
Start: 2020-08-28

## 2020-11-10 RX ORDER — HYDRALAZINE HYDROCHLORIDE 25 MG/1
25 TABLET ORAL EVERY 8 HOURS
Refills: 0 | Status: DISCONTINUED | COMMUNITY
End: 2020-11-10

## 2020-11-10 RX ORDER — MAGNESIUM OXIDE 241.3 MG/1000MG
400 TABLET ORAL
Refills: 0 | Status: ACTIVE | COMMUNITY
Start: 2020-09-24

## 2020-11-10 NOTE — HISTORY OF PRESENT ILLNESS
[FreeTextEntry1] : TIA/ stroke, s/p RONNY and Implantable loop recorder.\par \par HPI for today: : feels good.  no chest pain . no dyspnea on exertion . no headhaces. no dizziness. no palpitaitons. compliatn with meds. no problems with meds.\par taking anticoagulation . no bleeding issues. \par Chronic conditions are stable and no acute change.\par Hypertension: Control on meds. compliant with meds. Reconciliation done.\par hyperlipidemia: at goal on meds. Compliant with meds. reconciliation done.\par Diabetes: does Diet and exercis\par risk of coronary artery disease:  Compliant with diet and lifestyle management.\par Coronary artery disease/ Stable ischemic heart disease. Complian with meds.  Stable . no acute change. \par Afib: rate controlled or sinus. \par  \par \par old note:  recent covid infection. may 2020.  he was in hospital for 2 weeks.  he had Blood clot in the right lower leg. he was on anticoagulation for atrial fibrillation . \par his PMD has ordered US venosu Dupelx right leg. \par he has been feelign ok now. no cough. NO fever. No headaches. He is AAntibody positive. \par \par old note:  feels good. no chest pain. no palpitaitons. no headaches. \par no syncope. \par no other peisode of tia.  ate chinise food last night. \par Chronic conditions are stable and no acute change.\par Hypertension: unControl on meds. compliant with meds. Reconciliation done.non complaint with diet. \par hyperlipidemia: at goal on meds. Compliant with meds. reconciliation done.\par   risk of coronary artery disease:  Non Compliant with diet and lifestyle management.\par  \par  \par \par old note: Complains of right shoulder pain. also the left arm opain. no chest pain one dxertion. no dizziness. no dyspnea. no palpitations. \par no headaches\par states its worse when he lifts above the head.\par at night hits hand gets numb.\par \par \par old note: feels good. No chest pain. no headache. no dizziness.\par Does not exercise.\par \par old note: patient feels better. was seen by PMD . was started on beta blocker  due to one episode of high heart rate. found to be anemic. PMD presumes GI blood loss anemia on anticoagulation . \par No dark color stools. Brown color stools. No GERD symptoms. No gastritis. No history of peptic ulcer. last colonoscopy was done 2 years ago. one polyp removed. Prior history of polyps that were removed. \par \par old note: No chest pain.   No dyspnea. No Palpitations.  last time loop check showed Afib. was started on Anticoagulation. \par \par Old note: patient had paroxysmal afib on loop recorder. 2 mins. Started on eliquis. High co pay. 190$. will switch to xarelto. Stopped plavix. Drinks too much coffee. 3-4 cups a day. NO palpitations. No diizzienss. Complain f left arm numbness. No chest pain. 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 \par Denies any chest pain. No dyspnea.  \par RONNY showed Atherosclerotic plaque in the aorta. \par No dizziness.\par \par

## 2020-11-10 NOTE — DISCUSSION/SUMMARY
[Patient] : the patient [Risks] : risks [Benefits] : benefits [Alternatives] : alternatives [___ Month(s)] : [unfilled] month(s) [With Me] : with me [FreeTextEntry1] : This is a 73 year old male with HTN, DM, aortic atherosclerosis with pontine infarct with pontine infarct.\par 1) shoulder pain. right shoulder pain. musculoskeletal. \par \par 1. P afib: CHADSVASC 4, SR on EKG today. ct. Xarelto\par 2. Left pontine infarct: Cryptogenic. afib and on anticoagulation. ct asa 81 mg daily. ct lipitor, \par 3. HTN: ,  amlodipine - 10 mg.  losartan 100 mg, change metoprolol to coreg 12.5 Q12 \par 4. history of deep vein thrombosis : /on long term anticoagulation for atrial fibrillation

## 2020-11-10 NOTE — CARDIOLOGY SUMMARY
[___] : [unfilled] [LVEF ___%] : LVEF [unfilled]% [None] : normal LV function [Normal] : normal LA size [Mild] : mild mitral regurgitation [de-identified] : RONNY: Marked atherosclerosis of aorta. Otherwise, no other cardiac source of emboli. NO significant valvular abnormality.

## 2020-12-04 ENCOUNTER — RX CHANGE (OUTPATIENT)
Age: 75
End: 2020-12-04

## 2021-02-17 NOTE — PATIENT PROFILE ADULT - NSPROPOAURINARYCATHETER_GEN_A_NUR
From: Naty Mccormick  To: Maribel Hogan  Sent: 2/17/2021 1:15 PM CST  Subject: Upcoming Appointment    It won't let me do the video visitation 90 min before it    no

## 2021-03-15 ENCOUNTER — RX RENEWAL (OUTPATIENT)
Age: 76
End: 2021-03-15

## 2021-04-20 NOTE — PROGRESS NOTE ADULT - ASSESSMENT
What Type Of Note Output Would You Prefer (Optional)?: Standard Output How Severe Are Your Spot(S)?: moderate 73 y/o M w/ PMHx Afib (Xarelto), T2DM, HTN, HLD, Anemia, TIA, CVA who presented to ED due to acute hypoxic respiratory distress secondary to COVID pneumonia.     1. Aute Hypoxic Respiratory Failure, Secondary to covid Pneumonia, B/L viral PNA onCXR  Now resolved  Currently on RA, no o2 needed on discharge. 91-92% on RA on ambulation   CTA Chest negative for PE  Completed Plaquenil and Solumedrol  Inflammatory markers trending down  Initially started on IV antibiotics by ID - discontinued as afebrile no leukocytosis     2. Atrial fibrillation   Converted to NSR   C/w Xarelto and lopressor   Currently rate controlled  CHADS2 score: 5  HAS-BLED: 4    3. HOSSEIN  2/2 to steroid induced DKA/dehydration  Resolved     4. Hypomagnesemia  Resolved     5. Anion Gap Diabetic Ketoacidosis   Resolved   Anion gap closed - 13, HCO3 improved 24 from 17  POCT   HbA1c: 7.6  Continue ISS w/ premeal and lantus     6. Hypertension  C/w Losartan, hydralazine, lopressor, norvasc. New Rx sent to [facility] pharmacy     7. Hx of CVA  No new neurological symptoms  C/w ASA and statin    Prophylactic Measure  Adequately anticoagulated with Xarelto    DISPO: DC home today. No O2 required on DC. CCM to set up homecare for DC Have Your Spot(S) Been Treated In The Past?: has not been treated Hpi Title: Evaluation of Skin Lesions

## 2021-05-18 ENCOUNTER — APPOINTMENT (OUTPATIENT)
Dept: CARDIOLOGY | Facility: CLINIC | Age: 76
End: 2021-05-18
Payer: MEDICARE

## 2021-05-18 ENCOUNTER — NON-APPOINTMENT (OUTPATIENT)
Age: 76
End: 2021-05-18

## 2021-05-18 VITALS
TEMPERATURE: 98 F | WEIGHT: 217 LBS | HEIGHT: 70 IN | HEART RATE: 55 BPM | DIASTOLIC BLOOD PRESSURE: 66 MMHG | SYSTOLIC BLOOD PRESSURE: 148 MMHG | BODY MASS INDEX: 31.07 KG/M2 | OXYGEN SATURATION: 98 %

## 2021-05-18 DIAGNOSIS — R20.0 ANESTHESIA OF SKIN: ICD-10-CM

## 2021-05-18 DIAGNOSIS — I63.50 CEREBRAL INFARCTION DUE TO UNSPECIFIED OCCLUSION OR STENOSIS OF UNSPECIFIED CEREBRAL ARTERY: ICD-10-CM

## 2021-05-18 PROCEDURE — 99215 OFFICE O/P EST HI 40 MIN: CPT

## 2021-05-18 PROCEDURE — 93000 ELECTROCARDIOGRAM COMPLETE: CPT

## 2021-05-18 NOTE — CARDIOLOGY SUMMARY
[de-identified] : 5/18/2021 :   Sinus  Bradycardia \par   LVH  ST/T abnormality may be normal. \par \par BORDERLINE [___] : [unfilled] [LVEF ___%] : LVEF [unfilled]% [None] : normal LV function [Normal] : normal LA size [Mild] : mild mitral regurgitation [de-identified] : RONNY: Marked atherosclerosis of aorta. Otherwise, no other cardiac source of emboli. NO significant valvular abnormality.

## 2021-05-18 NOTE — DISCUSSION/SUMMARY
[Patient] : the patient [Risks] : risks [Benefits] : benefits [Alternatives] : alternatives [With Me] : with me [___ Month(s)] : in [unfilled] month(s) [FreeTextEntry1] : This is a 73 year old male with HTN, DM, aortic atherosclerosis with pontine infarct with pontine infarct.\par 1) shoulder pain. right shoulder pain. musculoskeletal. \par \par 1. P afib: CHADSVASC 4, SR on EKG today. ct. Xarelto\par 2. Left pontine infarct: Cryptogenic. afib and on anticoagulation. ct asa 81 mg daily. ct lipitor,  lipid profile next year.\par 3. HTN: ,  amlodipine - 10 mg.  losartan 100 mg,  coreg 12.5 Q12  repeat echo,. \par 4. history of deep vein thrombosis : /on long term anticoagulation for atrial fibrillation

## 2021-05-18 NOTE — REASON FOR VISIT
[FreeTextEntry1] : TIA/ stroke, s/p RONNY and Implantable loop recorder, paroxsymal afib  [Follow-Up - From Hospitalization] : follow-up of a recent hospitalization for [FreeTextEntry2] : TIA/ stroke, s/p RONNY and Implantable loop recorder, paroxsymal afib

## 2021-05-18 NOTE — HISTORY OF PRESENT ILLNESS
[FreeTextEntry1] : TIA/ stroke, s/p RONNY and Implantable loop recorder.\par \par HPI for today: : he had a p[rocedure done for his prostate in jan.  \par he has been feeling ok.  he has been compliatn with meds.  \par no haeadaches. no dizziness. no sycnope.\par  he ate pizza .  was very salty . he ate that last night.\par otherwise he is complaint. \par \par old note: : feels good.  no chest pain . no dyspnea on exertion . no headhaces. no dizziness. no palpitaitons. compliatn with meds. no problems with meds.\par taking anticoagulation . no bleeding issues. \par Chronic conditions are stable and no acute change.\par Hypertension: Control on meds. compliant with meds. Reconciliation done.\par hyperlipidemia: at goal on meds. Compliant with meds. reconciliation done.\par Diabetes: does Diet and exercis\par risk of coronary artery disease:  Compliant with diet and lifestyle management.\par Coronary artery disease/ Stable ischemic heart disease. Complian with meds.  Stable . no acute change. \par Afib: rate controlled or sinus. \par  \par \par old note:  recent covid infection. may 2020.  he was in hospital for 2 weeks.  he had Blood clot in the right lower leg. he was on anticoagulation for atrial fibrillation . \par his PMD has ordered US venosu Dupelx right leg. \par he has been feelign ok now. no cough. NO fever. No headaches. He is AAntibody positive. \par \par old note:  feels good. no chest pain. no palpitaitons. no headaches. \par no syncope. \par no other peisode of tia.  ate chinise food last night. \par Chronic conditions are stable and no acute change.\par Hypertension: unControl on meds. compliant with meds. Reconciliation done.non complaint with diet. \par hyperlipidemia: at goal on meds. Compliant with meds. reconciliation done.\par   risk of coronary artery disease:  Non Compliant with diet and lifestyle management.\par  \par  \par \par old note: Complains of right shoulder pain. also the left arm opain. no chest pain one dxertion. no dizziness. no dyspnea. no palpitations. \par no headaches\par states its worse when he lifts above the head.\par at night hits hand gets numb.\par \par \par old note: feels good. No chest pain. no headache. no dizziness.\par Does not exercise.\par \par old note: patient feels better. was seen by PMD . was started on beta blocker  due to one episode of high heart rate. found to be anemic. PMD presumes GI blood loss anemia on anticoagulation . \par No dark color stools. Brown color stools. No GERD symptoms. No gastritis. No history of peptic ulcer. last colonoscopy was done 2 years ago. one polyp removed. Prior history of polyps that were removed. \par \par old note: No chest pain.   No dyspnea. No Palpitations.  last time loop check showed Afib. was started on Anticoagulation. \par \par Old note: patient had paroxysmal afib on loop recorder. 2 mins. Started on eliquis. High co pay. 190$. will switch to xarelto. Stopped plavix. Drinks too much coffee. 3-4 cups a day. NO palpitations. No diizzienss. Complain f left arm numbness. No chest pain. 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 \par Denies any chest pain. No dyspnea.  \par RONNY showed Atherosclerotic plaque in the aorta. \par No dizziness.\par \par

## 2021-05-18 NOTE — PHYSICAL EXAM
[General Appearance - Well Developed] : well developed [Normal Appearance] : normal appearance [Well Groomed] : well groomed [General Appearance - Well Nourished] : well nourished [No Deformities] : no deformities [General Appearance - In No Acute Distress] : no acute distress [Normal Conjunctiva] : the conjunctiva exhibited no abnormalities [Eyelids - No Xanthelasma] : the eyelids demonstrated no xanthelasmas [Normal Oral Mucosa] : normal oral mucosa [No Oral Pallor] : no oral pallor [No Oral Cyanosis] : no oral cyanosis [Normal Jugular Venous A Waves Present] : normal jugular venous A waves present [Normal Jugular Venous V Waves Present] : normal jugular venous V waves present [No Jugular Venous Leos A Waves] : no jugular venous leos A waves [Respiration, Rhythm And Depth] : normal respiratory rhythm and effort [Exaggerated Use Of Accessory Muscles For Inspiration] : no accessory muscle use [Auscultation Breath Sounds / Voice Sounds] : lungs were clear to auscultation bilaterally [Heart Rate And Rhythm] : heart rate and rhythm were normal [Heart Sounds] : normal S1 and S2 [Murmurs] : no murmurs present [Abdomen Soft] : soft [Abdomen Tenderness] : non-tender [Abdomen Mass (___ Cm)] : no abdominal mass palpated [Abnormal Walk] : normal gait [FreeTextEntry1] : canot walk on treadmill. afraid to fall  [Nail Clubbing] : no clubbing of the fingernails [Cyanosis, Localized] : no localized cyanosis [Petechial Hemorrhages (___cm)] : no petechial hemorrhages [] : no rash [Skin Color & Pigmentation] : normal skin color and pigmentation [No Venous Stasis] : no venous stasis [Skin Lesions] : no skin lesions [No Skin Ulcers] : no skin ulcer [No Xanthoma] : no  xanthoma was observed [Oriented To Time, Place, And Person] : oriented to person, place, and time [Affect] : the affect was normal [Mood] : the mood was normal [No Anxiety] : not feeling anxious

## 2021-06-09 ENCOUNTER — APPOINTMENT (OUTPATIENT)
Dept: CARDIOLOGY | Facility: CLINIC | Age: 76
End: 2021-06-09
Payer: MEDICARE

## 2021-06-09 PROCEDURE — 93306 TTE W/DOPPLER COMPLETE: CPT

## 2021-06-16 ENCOUNTER — TRANSCRIPTION ENCOUNTER (OUTPATIENT)
Age: 76
End: 2021-06-16

## 2021-06-18 NOTE — DISCHARGE NOTE PROVIDER - NSDCCONDITION_GEN_ALL_CORE
After Visit Summary   3/14/2018    Francesca Moura    MRN: [id number]           Patient Information     Date Of Birth          1980        Visit Information        Provider Department      3/14/2018 1:50 PM Lindsay Campa APRN CNP [facility]        Today's Diagnoses     Encounter for gynecological examination without abnormal finding    -  1    Depo-Provera contraceptive status           Follow-ups after your visit        Who to contact     If you have questions or need follow up information about today's clinic visit or your schedule please contact [facility] directly at [phone].  Normal or non-critical lab and imaging results will be communicated to you by MyChart, letter or phone within 4 business days after the clinic has received the results. If you do not hear from us within 7 days, please contact the clinic through [employer] or phone. If you have a critical or abnormal lab result, we will notify you by phone as soon as possible.  Submit refill requests through [employer] or call your pharmacy and they will forward the refill request to us. Please allow 3 business days for your refill to be completed.          Additional Information About Your Visit        MyChart Information     [employer] gives you secure access to your electronic health record. If you see a primary care provider, you can also send messages to your care team and make appointments. If you have questions, please call your primary care clinic.  If you do not have a primary care provider, please call [phone] and they will assist you.        Care EveryWhere ID     This is your Care EveryWhere ID. This could be used by other organizations to access your [facility] medical records  [id number]        Your Vitals Were     Pulse Temperature Pulse Oximetry BMI (Body Mass Index)          96 97.8  F (36.6  C) (Oral) 98% 38.44 kg/m2         Blood Pressure from Last 3 Encounters:   03/14/18 131/81    ASSESSMENT/ PLAN    1. Chronic pain  Chronic low back pain. Being worked by by spine and neurosurgery. Reviewed these specialty visits. Will increase his percocet to 1 tablet twice daily for better pain control  - oxyCODONE-acetaminophen (PERCOCET) 5-325 mg per tablet; Take 1 tablet by mouth 2 (two) times a day.  Dispense: 60 tablet; Refill: 0    2. Benign essential hypertension  BP low today 88/60 but he has no symptoms. His BP was 94/50 at neurosurgery visit. I see that he was seen by cardiology today after being seen by me and BP there is better 118/78. I did have my nursing assistant call his assisted living for BP values in the previous few days and advised them to check his BP and let me know what his values are the rest of this week. May need to adjust BP medications if continues to be borderline.     RTC in 3 months for follow up.      This note was created using Dragon dictation software, spelling errors may occur.     Jennifer Tan MD        SUBJECTIVE   Derrick Silva is a 87 y.o. old male with a past medical history of type 2 diabetes, aortic stenosis, LBBB, s/p pacemaker placement, chronic pain syndrome in his back, depression, prostate cancer who presented to clinic today for further evaluation of chronic pain. He was last seen 4/26/18 for multiple concerns and medication check. I sent him to spine clinic for further evaluation of his low back pain and since then has been seen by spine and also neurosurgery and is scheduled for cervical myelogram for further evaluation of his neurological symptoms. He's tired of all the medical appointments. He's on percocet 1 tablet daily which is not effective for him and he's in pain all the time. He would like to take more pain medication. He is also scheduled with cardiology to establish care and also neurology for further work up of possible cognitive impairment. He's now in assisted living along with his wife at [facility]. Also his blood pressure is    12/21/17 141/84   09/21/17 134/88    Weight from Last 3 Encounters:   03/14/18 231 lb (104.8 kg)   07/19/17 226 lb (102.5 kg)   07/14/17 226 lb (102.5 kg)              We Performed the Following     C Medroxyprogesterone inj/1mg     HPV High Risk Types DNA Cervical     Pap imaged thin layer screen with HPV - recommended age 30 - 65 years (select HPV order below)          Where to get your medicines      Some of these will need a paper prescription and others can be bought over the counter.  Ask your nurse if you have questions.     You don't need a prescription for these medications     medroxyPROGESTERone 150 MG/ML injection          Primary Care Provider Office Phone # Fax #    Kristen M Kehr, PA-C [phone] [address] [facility] [address]        Equal Access to Services     [facility] : Hadii aad ku hadasho Sokeith, waaxda luqadaha, qaybta kaalmada adetyrayada, camille collado . So [facility] [phone].    ATENCIÓN: Si habla español, tiene a garcia disposición servicios gratuitos de asistencia lingüística. Llame al [phone].    We comply with applicable federal civil rights laws and [place] laws. We do not discriminate on the basis of race, color, national origin, age, disability, sex, sexual orientation, or gender identity.            Thank you!     Thank you for choosing [facility]  for your care. Our goal is always to provide you with excellent care. Hearing back from our patients is one way we can continue to improve our services. Please take a few minutes to complete the written survey that you may receive in the mail after your visit with us. Thank you!             Your Updated Medication List - Protect others around you: Learn how to safely use, store and throw away your medicines at www.disposemymeds.org.          This list is accurate as of 3/14/18  2:45 PM.  Always use your most recent med list.                   Brand Name Dispense  mildly low today in the 88 systolic but patient denies any lightheadedness, dizziness, chest pain, or any other symptoms except ongoing back pain. He did have another mechanical fall since the last time I saw him and he was evaluated at the ED on 6/9/18 and imaging was unremarkable except DJD seen in hips and lumbar spine.     Review of Systems:  Negative except as noted in HPI      The following portions of the patient's history were reviewed and updated as appropriate: past medical history, past surgical history, family history, allergies, current medications and problem list.    Medical History  Active Ambulatory (Non-Hospital) Problems    Diagnosis     Chronic pain     Has written advance directive indicating do not resuscitate status     Chronic stable angina (H)     Depression     Memory difficulty     Type 2 diabetes mellitus (H)     Eczema     Constipation     Diabetes mellitus type 2, controlled, with complications (H)     Obesity     Aortic stenosis     Hyperlipidemia LDL goal <100     Arteriosclerotic heart disease     Benign essential hypertension     BPH (benign prostatic hyperplasia)     GERD (gastroesophageal reflux disease)     Onychia of toe     Osteoarthritis     Prostate cancer (H)     History of adenocarcinoma of prostate     Encounter for long-term (current) use of high-risk medication     Onychomycosis     Past Medical History:   Diagnosis Date     Aortic stenosis      Arthritis      Cancer (H)      Chronic pain      Constipation      Depression      Diabetes mellitus (H)      Diabetes mellitus, type II (H)      Eczema      GERD (gastroesophageal reflux disease)      Heart disease      Hyperlipidemia      Hypertension      Osteoarthritis      Pacemaker        Surgical History  He  has a past surgical history that includes Carpal tunnel release (2014); Cardiac pacemaker placement; and Hemorrhoid surgery.    Social History  Reviewed, and he  reports that he has never smoked. He has never used  Instructions for use Diagnosis    ADDERALL 10 MG per tablet   Generic drug:  amphetamine-dextroamphetamine     75 tablet    Take 1 tablet (10 mg) by mouth 2 times daily Take a third tablet if needed        albuterol 108 (90 BASE) MCG/ACT Inhaler    PROAIR HFA/PROVENTIL HFA/VENTOLIN HFA    2 Inhaler    Inhale 2 puffs into the lungs every 4 hours as needed for shortness of breath / dyspnea    Intermittent asthma, uncomplicated       ALLEGRA ALLERGY PO           amitriptyline 25 MG tablet    ELAVIL    90 tablet    Take 1 tablet (25 mg) by mouth At Bedtime    Fibromyalgia       medroxyPROGESTERone 150 MG/ML injection    DEPO-PROVERA    1 mL    Inject 1 mL (150 mg) into the muscle every 3 months    Depo-Provera contraceptive status       traMADol 50 MG tablet    ULTRAM    60 tablet    Take 1 tablet (50 mg) by mouth every 6 hours as needed for moderate pain        VITAMIN D (CHOLECALCIFEROL) PO      Take 1,000 Units by mouth daily           smokeless tobacco. He reports that he does not drink alcohol or use illicit drugs.    Family History  Reviewed, and family history includes No Medical Problems in his father and mother; Parkinsonism in his daughter.    Medications  Reviewed and reconciled    Allergies  No Known Allergies      OBJECTIVE  Physical Exam:  Vital signs: BP (!) 88/60 (Patient Site: Right Arm, Patient Position: Sitting, Cuff Size: Adult Large)  Pulse 70  Temp 97.8  F (36.6  C) (Oral)   Resp 18  Wt 202 lb 14.4 oz (92 kg)  BMI 32.75 kg/m2  Weight: 202 lb 14.4 oz (92 kg)    General appearance: pleasant, appears stated age, cooperative and in no distress  Eyes: EOMs intact, PERRL, conjunctivae normal.   ENT: moist oral mucosa, posterior oropharynx normal,   Lymph: no cervical/supraclavicular adenopathy  Respiratory: clear to auscultation bilaterally, good air movement throughout, no wheezing or crackles, speaking full sentences without difficulty  Cardiovascular: regular rate and rhythm, no murmur appreciated, no leg edema  Skin: no rashes  Neuro: alert oriented x 3, grossly normal otherwise  Psych: flat affect, appropriate conversation        Stable

## 2021-07-19 ENCOUNTER — RX RENEWAL (OUTPATIENT)
Age: 76
End: 2021-07-19

## 2021-11-19 ENCOUNTER — NON-APPOINTMENT (OUTPATIENT)
Age: 76
End: 2021-11-19

## 2021-11-19 ENCOUNTER — APPOINTMENT (OUTPATIENT)
Dept: CARDIOLOGY | Facility: CLINIC | Age: 76
End: 2021-11-19
Payer: MEDICARE

## 2021-11-19 VITALS — SYSTOLIC BLOOD PRESSURE: 156 MMHG | DIASTOLIC BLOOD PRESSURE: 79 MMHG

## 2021-11-19 VITALS
OXYGEN SATURATION: 97 % | TEMPERATURE: 97.8 F | SYSTOLIC BLOOD PRESSURE: 152 MMHG | DIASTOLIC BLOOD PRESSURE: 68 MMHG | HEART RATE: 57 BPM | BODY MASS INDEX: 31.78 KG/M2 | HEIGHT: 70 IN | WEIGHT: 222 LBS

## 2021-11-19 DIAGNOSIS — I20.8 OTHER FORMS OF ANGINA PECTORIS: ICD-10-CM

## 2021-11-19 DIAGNOSIS — I48.0 PAROXYSMAL ATRIAL FIBRILLATION: ICD-10-CM

## 2021-11-19 DIAGNOSIS — G45.9 TRANSIENT CEREBRAL ISCHEMIC ATTACK, UNSPECIFIED: ICD-10-CM

## 2021-11-19 DIAGNOSIS — I10 ESSENTIAL (PRIMARY) HYPERTENSION: ICD-10-CM

## 2021-11-19 PROCEDURE — 99215 OFFICE O/P EST HI 40 MIN: CPT

## 2021-11-19 PROCEDURE — 93000 ELECTROCARDIOGRAM COMPLETE: CPT

## 2021-11-19 RX ORDER — ASPIRIN 81 MG/1
81 TABLET ORAL DAILY
Qty: 90 | Refills: 3 | Status: DISCONTINUED | COMMUNITY
Start: 2017-01-11 | End: 2021-11-19

## 2021-11-19 RX ORDER — CHLORTHALIDONE 25 MG/1
25 TABLET ORAL DAILY
Qty: 30 | Refills: 5 | Status: ACTIVE | COMMUNITY
Start: 2021-11-19 | End: 1900-01-01

## 2021-11-19 NOTE — CARDIOLOGY SUMMARY
[___] : [unfilled] [LVEF ___%] : LVEF [unfilled]% [None] : normal LV function [Normal] : normal LA size [Mild] : mild mitral regurgitation [de-identified] : 11 19 2021  Sinus Bradycardia \par  Abnormal P axis, H Rate 54\par  LVH   \par \par \par 5/18/2021 :   Sinus  Bradycardia \par   LVH  ST/T abnormality may be normal. \par \par BORDERLINE [de-identified] : jun 2021: Normal wall motion LVEf 54%. mild LVH. normal Rv funciton. no significant valvular abnormality  [de-identified] : RONNY: Marked atherosclerosis of aorta. Otherwise, no other cardiac source of emboli. NO significant valvular abnormality.

## 2021-11-19 NOTE — HISTORY OF PRESENT ILLNESS
[FreeTextEntry1] : TIA/ stroke, s/p RONNY and Implantable loop recorder.\par \par HPI for today:   no cehst pain. no dyspnea. no dizziness.   he is not takign aspirin because of upset stomach.\par no bleeding. complaint with zarelto. had a lottle bleeding from rectum.\par \par \par old note: : he had a p[rocedure done for his prostate in jan.  \par he has been feeling ok.  he has been compliatn with meds.  \par no haeadaches. no dizziness. no sycnope.\par  he ate pizza .  was very salty . he ate that last night.\par otherwise he is complaint. \par \par old note: : feels good.  no chest pain . no dyspnea on exertion . no headhaces. no dizziness. no palpitaitons. compliatn with meds. no problems with meds.\par taking anticoagulation . no bleeding issues. \par Chronic conditions are stable and no acute change.\par Hypertension: Control on meds. compliant with meds. Reconciliation done.\par hyperlipidemia: at goal on meds. Compliant with meds. reconciliation done.\par Diabetes: does Diet and exercis\par risk of coronary artery disease:  Compliant with diet and lifestyle management.\par Coronary artery disease/ Stable ischemic heart disease. Complian with meds.  Stable . no acute change. \par Afib: rate controlled or sinus. \par  \par \par old note:  recent covid infection. may 2020.  he was in hospital for 2 weeks.  he had Blood clot in the right lower leg. he was on anticoagulation for atrial fibrillation . \par his PMD has ordered US venosu Dupelx right leg. \par he has been feelign ok now. no cough. NO fever. No headaches. He is AAntibody positive. \par \par old note:  feels good. no chest pain. no palpitaitons. no headaches. \par no syncope. \par no other peisode of tia.  ate chinise food last night. \par Chronic conditions are stable and no acute change.\par Hypertension: unControl on meds. compliant with meds. Reconciliation done.non complaint with diet. \par hyperlipidemia: at goal on meds. Compliant with meds. reconciliation done.\par   risk of coronary artery disease:  Non Compliant with diet and lifestyle management.\par  \par  \par \par old note: Complains of right shoulder pain. also the left arm opain. no chest pain one dxertion. no dizziness. no dyspnea. no palpitations. \par no headaches\par states its worse when he lifts above the head.\par at night hits hand gets numb.\par \par \par old note: feels good. No chest pain. no headache. no dizziness.\par Does not exercise.\par \par old note: patient feels better. was seen by PMD . was started on beta blocker  due to one episode of high heart rate. found to be anemic. PMD presumes GI blood loss anemia on anticoagulation . \par No dark color stools. Brown color stools. No GERD symptoms. No gastritis. No history of peptic ulcer. last colonoscopy was done 2 years ago. one polyp removed. Prior history of polyps that were removed. \par \par old note: No chest pain.   No dyspnea. No Palpitations.  last time loop check showed Afib. was started on Anticoagulation. \par \par Old note: patient had paroxysmal afib on loop recorder. 2 mins. Started on eliquis. High co pay. 190$. will switch to xarelto. Stopped plavix. Drinks too much coffee. 3-4 cups a day. NO palpitations. No diizzienss. Complain f left arm numbness. No chest pain. 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 \par Denies any chest pain. No dyspnea.  \par RONNY showed Atherosclerotic plaque in the aorta. \par No dizziness.\par \par

## 2021-11-19 NOTE — DISCUSSION/SUMMARY
[Patient] : the patient [Risks] : risks [Benefits] : benefits [Alternatives] : alternatives [With Me] : with me [___ Week(s)] : in [unfilled] week(s) [FreeTextEntry1] : This is a 73 year old male with HTN, DM, aortic atherosclerosis with pontine infarct with pontine infarct.\par 1) shoulder pain. right shoulder pain. musculoskeletal. \par \par 1. P afib: CHADSVASC 4, SR on EKG today. ct. Xarelto\par 2. Left pontine infarct: Cryptogenic. afib and on anticoagulation. ct asa 81 mg daily. ct lipitor,  lipid profile next year.\par 3. HTN: ,  amlodipine - 10 mg.  losartan 100 mg,  coreg 12.5 Q12   uncontrolled. renal Dupklex to rule out renal artery stenosis.   add chlorthaldidone 25 mg daily. \par 4. history of deep vein thrombosis : /on long term anticoagulation for atrial fibrillation \par 5. Per rectal bleeding: Colonoscopy referral. \par 3-4 days before next appt. BMP check. \par Will order and review ECG for the above mentioned diagnosis/condition/symptoms

## 2021-11-19 NOTE — REASON FOR VISIT
[Follow-Up - From Hospitalization] : follow-up of a recent hospitalization for [FreeTextEntry1] : TIA/ stroke, s/p RONNY and Implantable loop recorder, paroxsymal afib  [FreeTextEntry2] : TIA/ stroke, s/p RONNY and Implantable loop recorder, paroxsymal afib

## 2021-12-13 ENCOUNTER — RX CHANGE (OUTPATIENT)
Age: 76
End: 2021-12-13

## 2021-12-21 ENCOUNTER — APPOINTMENT (OUTPATIENT)
Dept: CARDIOLOGY | Facility: CLINIC | Age: 76
End: 2021-12-21

## 2022-02-16 NOTE — PROGRESS NOTE ADULT - ASSESSMENT
Plan: May consider referring to Rheumatologist to rule out PSA if no improvement. 75 y/o M w/ PMHx Afib (Xarelto), T2DM, HTN, HLD, Anemia, TIA, CVA who presented to ED due to acute hypoxic respiratory distress secondary to COVID pneumonia. CTA with b/l pneumonia. Titrated off supplemental oxygen, now O2 sat 92-95% on room air. Inflammatory markers trending down. Completed Plaquenil and Solumedrol. With Atrial fibrillation, currently rate controlled. c/w rivaroxaban for anticoagulation. HOSSEIN improved, Cr 1.02 today 4/15. Anion gap closed. To continue home medications for blood sugar. Seen by PT, recs home with home care.  O2 sat is 92% on room air on ambulation. O2 saturation at rest 92-95% on room air. However, noted to be dyspneic when speaking in long sentences so will observe overnight and continue to observe O2 on ambulation     acute Hypoxic Respiratory Failure, Secondary to covid Pneumonia, B/L viral pna on CXR  wean O2 down as tolerated, now on RA  Inflammatory markers trending down  CTA Chest negative for PE . Completed Plaquenil and Solumedrol  c/w Vitamin C 500mg three times a day, Thiamine 200mg QD  c/w albuterol prn, tessalon perles prn for cough  Initially started on IV antibiotics by ID - discontinued    Atrial fibrillation   converted to NSR   c/w Xarelto  currently rate controlled  Recently stopped Coreg, stated it was too strong for him  CHADS2 score: 5  HAS-BLED: 4  c/w ASA    HOSSEIN  2/2 to steroid induced DKA/dehydration  creatinine= 1.02  Improved    Hypomagnesemia-- repleted    Anion Gap Diabetic Ketoacidosis / resolved   Anion gap closed - 13, HCO3 improved 24 from 17  POCT   HbA1c: 7.6  continue ISS w/ premeal and lantus   c/w lantus to 30  c/w premeal 12  Encourage PO fluid intake  monitor electrolytes replete as necessary    Hypertension  Close blood pressure monitoring  c/w Losartan 100 QD, hydralazine, lopressor, Norvasc. new Rx sent to [facility] pharmacy     Orthostatic hypotension   Likely 2/2 to dehydration   Normal saline gentle hydration  repeat orthostatics    Hx of CVA  no new neurological symptoms  c/w statin    Prophylactic Measure  Adequately anticoagulated with Xarelto    Prognosis - guarded  Code Status: full code  Disposition - continue to observe O2 sat overnight and on ambulation. Home w/ home PT likely 4/16  Patient desat on room air with ambulation to 88%, will need home oxygen.     Emergency contact: Jessica (Wife) [phone] D/W wife today with daily update and plan Continue Regimen: Clobetasol 0.05% cream - apply bid x2weeks, then decrease to Monday through Friday at night prn itching. No refills needed at this time Initiate Treatment: Fluocinonide 0.05% solution - apply qhs Monday through Friday prn itching Detail Level: Zone Render In Strict Bullet Format?: No

## 2022-03-08 ENCOUNTER — RX RENEWAL (OUTPATIENT)
Age: 77
End: 2022-03-08

## 2022-03-08 RX ORDER — CARVEDILOL 12.5 MG/1
12.5 TABLET, FILM COATED ORAL
Qty: 180 | Refills: 3 | Status: ACTIVE | COMMUNITY
Start: 2020-11-10 | End: 1900-01-01

## 2022-10-31 NOTE — DISCHARGE NOTE PROVIDER - NSDCFUADDINST_GEN_ALL_CORE_FT
You have tested POSITIVE for the novel coronavirus (COVID-19). Upon discharge, you must self-quarantine for 14 days, or until the Department of Health contacts you. You should restrict activities outside of your home except for getting medical care. Do not go to work, school or public areas. Avoid using public transportation. Please wear a face mask if you are around other individuals. Try to avoid contact with house members, family, and friends for the duration of this quarantine. Please follow up with your primary care physician within 2-3 weeks of your discharge from [facility]. Please take all medications as prescribed. If you experience any worsening or recurrence of your symptoms, particularly worsening or high fever, shortness of breathe, extreme fatigue, or bloody cough please call 9-1-1 immediately or report to the nearest Emergency Department. If you have any questions or concerns, please do not hesitate to call the hospital at [phone]. unknown

## 2022-12-12 ENCOUNTER — RX RENEWAL (OUTPATIENT)
Age: 77
End: 2022-12-12

## 2022-12-12 RX ORDER — RIVAROXABAN 20 MG/1
20 TABLET, FILM COATED ORAL
Qty: 90 | Refills: 0 | Status: ACTIVE | COMMUNITY
Start: 2017-10-31 | End: 1900-01-01

## 2023-07-07 NOTE — HISTORY OF PRESENT ILLNESS
What Type Of Note Output Would You Prefer (Optional)?: Standard Output How Severe Is Your Skin Lesion?: moderate Has Your Skin Lesion Been Treated?: not been treated Is This A New Presentation, Or A Follow-Up?: Skin Lesion [FreeTextEntry1] : TIA/ stroke, s/p RONNY and Implantable loop recorder.\par Complains of right shoulder pain. also the left arm opain. no chest pain one dxertion. no dizziness. no dyspnea. no palpitations. \par no headaches\par states its worse when he lifts above the head.\par at night hits hand gets numb.\par \par \par old note: feels good. No chest pain. no headache. no dizziness.\par Does not exercise.\par \par old note: patient feels better. was seen by PMD . was started on beta blocker  due to one episode of high heart rate. found to be anemic. PMD presumes GI blood loss anemia on anticoagulation . \par No dark color stools. Brown color stools. No GERD symptoms. No gastritis. No history of peptic ulcer. last colonoscopy was done 2 years ago. one polyp removed. Prior history of polyps that were removed. \par \par old note: No chest pain.   No dyspnea. No Palpitations.  last time loop check showed Afib. was started on Anticoagulation. \par \par Old note: patient had paroxysmal afib on loop recorder. 2 mins. Started on eliquis. High co pay. 190$. will switch to xarelto. Stopped plavix. Drinks too much coffee. 3-4 cups a day. NO palpitations. No diizzienss. Complain f left arm numbness. No chest pain. 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 \par Denies any chest pain. No dyspnea.  \par RONNY showed Atherosclerotic plaque in the aorta. \par No dizziness.\par \par

## 2024-01-04 NOTE — ED ADULT NURSE NOTE - SUICIDE SCREENING QUESTION 2
Bed/Stretcher in lowest position, wheels locked, appropriate side rails in place/Call bell, personal items and telephone in reach/Instruct patient to call for assistance before getting out of bed/chair/stretcher/Non-slip footwear applied when patient is off stretcher/[place] to call system/Physically safe environment - no spills, clutter or unnecessary equipment/Purposeful proactive rounding/Room/bathroom lighting operational, light cord in reach Bed/Stretcher in lowest position, wheels locked, appropriate side rails in place/Call bell, personal items and telephone in reach/Instruct patient to call for assistance before getting out of bed/chair/stretcher/Non-slip footwear applied when patient is off stretcher/[place] to call system/Physically safe environment - no spills, clutter or unnecessary equipment/Purposeful proactive rounding/Room/bathroom lighting operational, light cord in reach No

## 2024-04-19 NOTE — PHYSICAL EXAM
170.18 [General Appearance - Well Developed] : well developed [Normal Appearance] : normal appearance [Well Groomed] : well groomed [No Deformities] : no deformities [General Appearance - Well Nourished] : well nourished [Eyelids - No Xanthelasma] : the eyelids demonstrated no xanthelasmas [General Appearance - In No Acute Distress] : no acute distress [Normal Conjunctiva] : the conjunctiva exhibited no abnormalities [Normal Oral Mucosa] : normal oral mucosa [Normal Jugular Venous A Waves Present] : normal jugular venous A waves present [No Oral Cyanosis] : no oral cyanosis [No Oral Pallor] : no oral pallor [No Jugular Venous Leos A Waves] : no jugular venous leos A waves [Normal Jugular Venous V Waves Present] : normal jugular venous V waves present [Respiration, Rhythm And Depth] : normal respiratory rhythm and effort [Exaggerated Use Of Accessory Muscles For Inspiration] : no accessory muscle use [Auscultation Breath Sounds / Voice Sounds] : lungs were clear to auscultation bilaterally [Heart Sounds] : normal S1 and S2 [Heart Rate And Rhythm] : heart rate and rhythm were normal [Murmurs] : no murmurs present [Abdomen Soft] : soft [Abdomen Tenderness] : non-tender [Abdomen Mass (___ Cm)] : no abdominal mass palpated [FreeTextEntry1] : canot walk on treadmill. afraid to fall  [Abnormal Walk] : normal gait [Nail Clubbing] : no clubbing of the fingernails [Cyanosis, Localized] : no localized cyanosis [Skin Color & Pigmentation] : normal skin color and pigmentation [Petechial Hemorrhages (___cm)] : no petechial hemorrhages [] : no rash [No Venous Stasis] : no venous stasis [No Skin Ulcers] : no skin ulcer [Skin Lesions] : no skin lesions [No Xanthoma] : no  xanthoma was observed [Affect] : the affect was normal [Oriented To Time, Place, And Person] : oriented to person, place, and time [Mood] : the mood was normal [No Anxiety] : not feeling anxious

## 2025-02-04 NOTE — PATIENT PROFILE ADULT - HOME ACCESSIBILITY CONCERNS
----- Message from Marycruz sent at 2/4/2025 11:35 AM CST -----  Mother called again , she wanting to know what the new results means  please give patient a call   none

## 2025-03-20 NOTE — DISCHARGE NOTE NURSING/CASE MANAGEMENT/SOCIAL WORK - NSDCPEXARELTOFU_GEN_ALL_CORE
[FreeTextEntry1] : 68-year-old male with hypertension and type 2 diabetes, takes 1 medication for hypertension and 1 tablet twice daily for diabetes but he does not recall the names, not taking GLP-1 receptor agonist.  Referred for routine screening colonoscopy.  No family history of colon cancer or polyps.  No GI complaints except for known lactose intolerance.  Had an attempted first colonoscopy 2 years ago but the prep was suboptimal.  Advised to reschedule.
Go for blood tests as directed. Your doctor will do lab tests at regular visits to monitor the effects of this medicine. Please follow up with your doctor and keep your health care provider appointments.